# Patient Record
Sex: FEMALE | Race: WHITE | Employment: OTHER | URBAN - NONMETROPOLITAN AREA
[De-identification: names, ages, dates, MRNs, and addresses within clinical notes are randomized per-mention and may not be internally consistent; named-entity substitution may affect disease eponyms.]

---

## 2017-01-17 LAB
A/G RATIO: NORMAL
ALBUMIN SERPL-MCNC: 4.2 G/DL
ALP BLD-CCNC: 63 U/L
ALT SERPL-CCNC: 20 U/L
AST SERPL-CCNC: 21 U/L
AVERAGE GLUCOSE: NORMAL
BASOPHILS ABSOLUTE: ABNORMAL
BASOPHILS RELATIVE PERCENT: ABNORMAL
BILIRUB SERPL-MCNC: 0.3 MG/DL (ref 0.1–1.4)
BILIRUBIN DIRECT: 0.08 MG/DL
BILIRUBIN, INDIRECT: 0.3
BUN BLDV-MCNC: 22 MG/DL
CALCIUM SERPL-MCNC: 10.2 MG/DL
CHLORIDE BLD-SCNC: 103 MMOL/L
CHOLESTEROL, TOTAL: 182 MG/DL
CHOLESTEROL/HDL RATIO: NORMAL
CO2: 23 MMOL/L
CREAT SERPL-MCNC: 1.01 MG/DL
CREATININE, URINE: 37.9
EOSINOPHILS ABSOLUTE: ABNORMAL
EOSINOPHILS RELATIVE PERCENT: ABNORMAL
FERRITIN: 31 NG/ML (ref 9–150)
FOLATE: 20
GFR CALCULATED: 56
GLOBULIN: NORMAL
GLUCOSE BLD-MCNC: 197 MG/DL
HBA1C MFR BLD: 9.1 %
HCT VFR BLD CALC: 36.1 % (ref 36–46)
HDLC SERPL-MCNC: 43 MG/DL (ref 35–70)
HEMOGLOBIN: 11.8 G/DL (ref 12–16)
IRON SATURATION: 19
IRON: 62
LDL CHOLESTEROL CALCULATED: 110 MG/DL (ref 0–160)
LYMPHOCYTES ABSOLUTE: ABNORMAL
LYMPHOCYTES RELATIVE PERCENT: ABNORMAL
MCH RBC QN AUTO: ABNORMAL PG
MCHC RBC AUTO-ENTMCNC: ABNORMAL G/DL
MCV RBC AUTO: ABNORMAL FL
MICROALBUMIN/CREAT 24H UR: 7.7 MG/G{CREAT}
MICROALBUMIN/CREAT UR-RTO: 20.3
MONOCYTES ABSOLUTE: ABNORMAL
MONOCYTES RELATIVE PERCENT: ABNORMAL
NEUTROPHILS ABSOLUTE: ABNORMAL
NEUTROPHILS RELATIVE PERCENT: ABNORMAL
PLATELET # BLD: 237 K/ΜL
PMV BLD AUTO: ABNORMAL FL
POTASSIUM SERPL-SCNC: 5 MMOL/L
PROTEIN TOTAL: 6.3 G/DL
RBC # BLD: 4 10^6/ΜL
SODIUM BLD-SCNC: 142 MMOL/L
TOTAL IRON BINDING CAPACITY: 320
TRIGL SERPL-MCNC: 146 MG/DL
VITAMIN B-12: 234
VITAMIN D 25-HYDROXY: NORMAL
VITAMIN D2, 25 HYDROXY: NORMAL
VITAMIN D3,25 HYDROXY: NORMAL
VLDLC SERPL CALC-MCNC: 29 MG/DL
WBC # BLD: 5.7 10^3/ML

## 2017-01-30 LAB — MICROSCOPIC URINE: NORMAL

## 2017-03-06 ENCOUNTER — NURSE TRIAGE (OUTPATIENT)
Dept: ADMINISTRATIVE | Age: 72
End: 2017-03-06

## 2018-04-08 ENCOUNTER — HOSPITAL ENCOUNTER (EMERGENCY)
Age: 73
Discharge: HOME | End: 2018-04-08
Payer: MEDICARE

## 2018-04-08 VITALS
HEIGHT: 62 IN | HEART RATE: 79 BPM | RESPIRATION RATE: 20 BRPM | BODY MASS INDEX: 32.42 KG/M2 | OXYGEN SATURATION: 95 % | WEIGHT: 176.2 LBS | TEMPERATURE: 99.1 F

## 2018-04-08 DIAGNOSIS — S46.211A BICEPS STRAIN, RIGHT, INITIAL ENCOUNTER: Primary | ICD-10-CM

## 2018-04-08 PROCEDURE — 99213 OFFICE O/P EST LOW 20 MIN: CPT | Performed by: NURSE PRACTITIONER

## 2018-04-08 PROCEDURE — 99212 OFFICE O/P EST SF 10 MIN: CPT

## 2018-04-08 RX ORDER — IBUPROFEN 200 MG
200 TABLET ORAL EVERY 6 HOURS PRN
COMMUNITY
End: 2020-06-22

## 2018-04-08 RX ORDER — AMLODIPINE BESYLATE 10 MG/1
10 TABLET ORAL DAILY
COMMUNITY
End: 2021-01-01 | Stop reason: SDUPTHER

## 2018-04-08 ASSESSMENT — PAIN DESCRIPTION - DESCRIPTORS: DESCRIPTORS: ACHING

## 2018-04-08 ASSESSMENT — PAIN DESCRIPTION - LOCATION: LOCATION: ARM;SHOULDER

## 2018-04-08 ASSESSMENT — ENCOUNTER SYMPTOMS
SHORTNESS OF BREATH: 0
DIARRHEA: 0
VOMITING: 0
COUGH: 0
NAUSEA: 0

## 2018-04-08 ASSESSMENT — PAIN SCALES - GENERAL: PAINLEVEL_OUTOF10: 4

## 2018-04-08 ASSESSMENT — PAIN DESCRIPTION - ORIENTATION: ORIENTATION: RIGHT;UPPER

## 2018-04-08 ASSESSMENT — PAIN DESCRIPTION - PAIN TYPE: TYPE: ACUTE PAIN

## 2018-04-30 ENCOUNTER — HOSPITAL ENCOUNTER (EMERGENCY)
Age: 73
Discharge: HOME | End: 2018-05-01
Payer: MEDICARE

## 2018-04-30 DIAGNOSIS — L72.3 SEBACEOUS CYST: Primary | ICD-10-CM

## 2018-04-30 DIAGNOSIS — L72.3 INFECTED SEBACEOUS CYST: ICD-10-CM

## 2018-04-30 DIAGNOSIS — L08.9 INFECTED SEBACEOUS CYST: ICD-10-CM

## 2018-04-30 PROCEDURE — 99282 EMERGENCY DEPT VISIT SF MDM: CPT

## 2018-05-01 VITALS
HEART RATE: 92 BPM | DIASTOLIC BLOOD PRESSURE: 80 MMHG | SYSTOLIC BLOOD PRESSURE: 180 MMHG | TEMPERATURE: 98.6 F | OXYGEN SATURATION: 95 % | RESPIRATION RATE: 18 BRPM

## 2018-05-01 RX ORDER — CEPHALEXIN 500 MG/1
500 CAPSULE ORAL 4 TIMES DAILY
Qty: 28 CAPSULE | Refills: 0 | Status: SHIPPED | OUTPATIENT
Start: 2018-05-01 | End: 2018-05-08

## 2018-05-07 ASSESSMENT — ENCOUNTER SYMPTOMS
COLOR CHANGE: 0
CHEST TIGHTNESS: 0
COUGH: 0
RHINORRHEA: 0
GASTROINTESTINAL NEGATIVE: 1
BACK PAIN: 0

## 2019-10-16 ENCOUNTER — HOSPITAL ENCOUNTER (OUTPATIENT)
Dept: ULTRASOUND IMAGING | Age: 74
Discharge: HOME | End: 2019-10-16
Payer: MEDICARE

## 2019-10-16 ENCOUNTER — HOSPITAL ENCOUNTER (OUTPATIENT)
Dept: WOMENS IMAGING | Age: 74
Discharge: HOME | End: 2019-10-16
Payer: MEDICARE

## 2019-10-16 DIAGNOSIS — I10 ESSENTIAL HYPERTENSION: ICD-10-CM

## 2019-10-16 DIAGNOSIS — Z12.39 BREAST SCREENING: ICD-10-CM

## 2019-10-16 DIAGNOSIS — Z78.0 POST-MENOPAUSAL: ICD-10-CM

## 2019-10-16 DIAGNOSIS — E78.2 MIXED HYPERLIPIDEMIA: ICD-10-CM

## 2019-10-16 PROCEDURE — 77080 DXA BONE DENSITY AXIAL: CPT

## 2019-10-16 PROCEDURE — 76706 US ABDL AORTA SCREEN AAA: CPT

## 2019-10-16 PROCEDURE — 77063 BREAST TOMOSYNTHESIS BI: CPT

## 2019-11-26 ENCOUNTER — HOSPITAL ENCOUNTER (OUTPATIENT)
Dept: GENERAL RADIOLOGY | Age: 74
Discharge: HOME | End: 2019-11-26
Payer: MEDICARE

## 2019-11-26 ENCOUNTER — HOSPITAL ENCOUNTER (OUTPATIENT)
Age: 74
Discharge: HOME | End: 2019-11-26
Payer: MEDICARE

## 2019-11-26 DIAGNOSIS — M25.562 LEFT KNEE PAIN, UNSPECIFIED CHRONICITY: ICD-10-CM

## 2019-11-26 PROCEDURE — 73562 X-RAY EXAM OF KNEE 3: CPT

## 2019-12-03 ENCOUNTER — APPOINTMENT (OUTPATIENT)
Dept: GENERAL RADIOLOGY | Age: 74
End: 2019-12-03
Payer: MEDICARE

## 2019-12-03 ENCOUNTER — HOSPITAL ENCOUNTER (EMERGENCY)
Age: 74
Discharge: HOME | End: 2019-12-03
Payer: MEDICARE

## 2019-12-03 VITALS
OXYGEN SATURATION: 97 % | RESPIRATION RATE: 16 BRPM | HEART RATE: 69 BPM | WEIGHT: 170 LBS | HEIGHT: 60 IN | TEMPERATURE: 98.8 F | BODY MASS INDEX: 33.38 KG/M2 | DIASTOLIC BLOOD PRESSURE: 65 MMHG | SYSTOLIC BLOOD PRESSURE: 162 MMHG

## 2019-12-03 DIAGNOSIS — S70.02XA CONTUSION OF LEFT HIP, INITIAL ENCOUNTER: Primary | ICD-10-CM

## 2019-12-03 DIAGNOSIS — S76.012A STRAIN OF LEFT HIP, INITIAL ENCOUNTER: ICD-10-CM

## 2019-12-03 PROCEDURE — 73502 X-RAY EXAM HIP UNI 2-3 VIEWS: CPT

## 2019-12-03 PROCEDURE — 6370000000 HC RX 637 (ALT 250 FOR IP): Performed by: EMERGENCY MEDICINE

## 2019-12-03 PROCEDURE — 99284 EMERGENCY DEPT VISIT MOD MDM: CPT

## 2019-12-03 RX ORDER — HYDROCODONE BITARTRATE AND ACETAMINOPHEN 5; 325 MG/1; MG/1
1 TABLET ORAL ONCE
Status: COMPLETED | OUTPATIENT
Start: 2019-12-03 | End: 2019-12-03

## 2019-12-03 RX ORDER — NAPROXEN 500 MG/1
500 TABLET ORAL 2 TIMES DAILY
Qty: 20 TABLET | Refills: 0 | Status: SHIPPED | OUTPATIENT
Start: 2019-12-03 | End: 2020-06-22

## 2019-12-03 RX ORDER — METAXALONE 400 MG/1
400 TABLET ORAL 3 TIMES DAILY
Qty: 30 TABLET | Refills: 0 | Status: SHIPPED | OUTPATIENT
Start: 2019-12-03 | End: 2019-12-13

## 2019-12-03 RX ADMIN — HYDROCODONE BITARTRATE AND ACETAMINOPHEN 1 TABLET: 5; 325 TABLET ORAL at 20:45

## 2019-12-03 ASSESSMENT — PAIN DESCRIPTION - PAIN TYPE: TYPE: ACUTE PAIN

## 2019-12-03 ASSESSMENT — PAIN SCALES - GENERAL
PAINLEVEL_OUTOF10: 5
PAINLEVEL_OUTOF10: 3
PAINLEVEL_OUTOF10: 5

## 2019-12-03 ASSESSMENT — PAIN DESCRIPTION - PROGRESSION: CLINICAL_PROGRESSION: GRADUALLY WORSENING

## 2019-12-03 ASSESSMENT — PAIN DESCRIPTION - FREQUENCY: FREQUENCY: CONTINUOUS

## 2019-12-03 ASSESSMENT — PAIN DESCRIPTION - ORIENTATION: ORIENTATION: LEFT

## 2019-12-03 ASSESSMENT — PAIN DESCRIPTION - ONSET: ONSET: ON-GOING

## 2019-12-03 ASSESSMENT — PAIN DESCRIPTION - LOCATION: LOCATION: KNEE

## 2019-12-03 ASSESSMENT — PAIN DESCRIPTION - DESCRIPTORS: DESCRIPTORS: DISCOMFORT

## 2019-12-04 ASSESSMENT — ENCOUNTER SYMPTOMS
SHORTNESS OF BREATH: 0
ABDOMINAL DISTENTION: 0
BACK PAIN: 0
COUGH: 0
COLOR CHANGE: 0

## 2020-06-17 LAB
AVERAGE GLUCOSE: 174
CREATININE, URINE: 90.8
HBA1C MFR BLD: 7.8 %
MICROALBUMIN/CREAT 24H UR: 7287 MG/G{CREAT}
MICROALBUMIN/CREAT UR-RTO: 8023
MICROSCOPIC URINE: NORMAL

## 2020-06-22 RX ORDER — PREDNISOLONE ACETATE 10 MG/ML
2 SUSPENSION/ DROPS OPHTHALMIC DAILY
COMMUNITY
End: 2020-10-01 | Stop reason: ALTCHOICE

## 2020-06-22 RX ORDER — TIMOLOL MALEATE 5 MG/ML
1 SOLUTION/ DROPS OPHTHALMIC 2 TIMES DAILY
COMMUNITY

## 2020-06-22 RX ORDER — FAMOTIDINE 20 MG
1 TABLET ORAL 2 TIMES DAILY
Status: ON HOLD | COMMUNITY
End: 2020-10-08 | Stop reason: ALTCHOICE

## 2020-06-22 RX ORDER — ASCORBIC ACID 500 MG
500 TABLET ORAL PRN
Status: ON HOLD | COMMUNITY
End: 2020-10-12 | Stop reason: HOSPADM

## 2020-06-25 ENCOUNTER — OFFICE VISIT (OUTPATIENT)
Dept: NEPHROLOGY | Age: 75
End: 2020-06-25
Payer: MEDICARE

## 2020-06-25 VITALS
BODY MASS INDEX: 32.89 KG/M2 | OXYGEN SATURATION: 99 % | HEART RATE: 88 BPM | SYSTOLIC BLOOD PRESSURE: 175 MMHG | DIASTOLIC BLOOD PRESSURE: 78 MMHG | TEMPERATURE: 97.8 F | WEIGHT: 168.4 LBS

## 2020-06-25 PROCEDURE — G8427 DOCREV CUR MEDS BY ELIG CLIN: HCPCS | Performed by: INTERNAL MEDICINE

## 2020-06-25 PROCEDURE — 1090F PRES/ABSN URINE INCON ASSESS: CPT | Performed by: INTERNAL MEDICINE

## 2020-06-25 PROCEDURE — G8417 CALC BMI ABV UP PARAM F/U: HCPCS | Performed by: INTERNAL MEDICINE

## 2020-06-25 PROCEDURE — 99204 OFFICE O/P NEW MOD 45 MIN: CPT | Performed by: INTERNAL MEDICINE

## 2020-06-25 PROCEDURE — G8399 PT W/DXA RESULTS DOCUMENT: HCPCS | Performed by: INTERNAL MEDICINE

## 2020-06-25 PROCEDURE — 4040F PNEUMOC VAC/ADMIN/RCVD: CPT | Performed by: INTERNAL MEDICINE

## 2020-06-25 PROCEDURE — 1123F ACP DISCUSS/DSCN MKR DOCD: CPT | Performed by: INTERNAL MEDICINE

## 2020-06-25 PROCEDURE — 3051F HG A1C>EQUAL 7.0%<8.0%: CPT | Performed by: INTERNAL MEDICINE

## 2020-06-25 PROCEDURE — 1036F TOBACCO NON-USER: CPT | Performed by: INTERNAL MEDICINE

## 2020-06-25 NOTE — PROGRESS NOTES
Provider, MD        Physical Examination:  VITALS:  BP (!) 175/78 (Site: Right Upper Arm, Position: Sitting, Cuff Size: Small Adult)   Pulse 88   Temp 97.8 °F (36.6 °C)   Wt 168 lb 6.4 oz (76.4 kg)   SpO2 99%   BMI 32.89 kg/m²    Body mass index is 32.89 kg/m². Wt Readings from Last 3 Encounters:   06/25/20 168 lb 6.4 oz (76.4 kg)   12/03/19 170 lb (77.1 kg)   04/08/18 176 lb 3.2 oz (79.9 kg)     Constitutional and General Appearance: alert and cooperative with exam, appears comfortable, no distress, not diaphoretic  Eyes: no icteric sclera in left eye or right eye,  no pallor conjunctiva in left or right eye, no discharge seen from left eye or right eye  Ears and Nose: normal external appearance of left and right ear. Both ear lobules are nontender to palpation. Normal external appearance of nose. No active drainage from nose. Oral: moist oral mucus membranes  Neck: No jugular venous distention, appears symmetric, good ROM  Lungs: Air entry B/L, no crackles or rales, no use of accessory muscles or labored breathing  Chest: No chest wall tenderness  Heart: regular rate, S1, S2  Extremities: Trace LE edema, no tenderness  GI: soft, non-tender, no guarding, no distention  Skin: no rash seen on exposed extremities, warm to touch  Musculo: moves all extremities, no clubbing or cyanosis of digits of either upper extremity. Neuro: no slurred speech, no facial drooping, symmetric strength  Psychiatric: Normal mood and affect, Not agitated     Laboratory & Diagnostics:  Old progress notes from referring physician reviewed. Old labs reviewed: Oct 2014: k 4.3, Creat 0.9  June 2020: UPCR 8 grams/g, Hb AIC 7.8%     Impression/Plan:   1. Nephrotic range proteinuria: Appears mostly due to diabetic nephropathy but cannot rule out other causes. Will send work-up including serologies. See below. Avoid high salt intake. Check BMP, 24 hr urine collection for protein.  Obtain kidney US to evaluate echogenicity and kidney

## 2020-07-09 ENCOUNTER — HOSPITAL ENCOUNTER (OUTPATIENT)
Age: 75
Discharge: HOME | End: 2020-07-09
Payer: MEDICARE

## 2020-07-09 ENCOUNTER — HOSPITAL ENCOUNTER (OUTPATIENT)
Dept: ULTRASOUND IMAGING | Age: 75
Discharge: HOME | End: 2020-07-09
Payer: MEDICARE

## 2020-07-09 LAB
ALBUMIN SERPL-MCNC: 4.2 G/DL (ref 3.5–5.1)
ALP BLD-CCNC: 68 U/L (ref 38–126)
ALT SERPL-CCNC: 15 U/L (ref 11–66)
ANION GAP SERPL CALCULATED.3IONS-SCNC: 14 MEQ/L (ref 8–16)
AST SERPL-CCNC: 23 U/L (ref 5–40)
BACTERIA: ABNORMAL
BILIRUB SERPL-MCNC: 0.2 MG/DL (ref 0.3–1.2)
BILIRUBIN URINE: NEGATIVE
BLOOD, URINE: NEGATIVE
BUN BLDV-MCNC: 31 MG/DL (ref 7–22)
CALCIUM SERPL-MCNC: 10.2 MG/DL (ref 8.5–10.5)
CASTS: ABNORMAL /LPF
CASTS: ABNORMAL /LPF
CHARACTER, URINE: CLEAR
CHLORIDE BLD-SCNC: 97 MEQ/L (ref 98–111)
CO2: 26 MEQ/L (ref 23–33)
COLOR: YELLOW
CREAT SERPL-MCNC: 1.9 MG/DL (ref 0.4–1.2)
CRYSTALS: ABNORMAL
EPITHELIAL CELLS, UA: ABNORMAL /HPF
ERYTHROCYTE [DISTWIDTH] IN BLOOD BY AUTOMATED COUNT: 12.7 % (ref 11.5–14.5)
ERYTHROCYTE [DISTWIDTH] IN BLOOD BY AUTOMATED COUNT: 41.6 FL (ref 35–45)
GFR SERPL CREATININE-BSD FRML MDRD: 26 ML/MIN/1.73M2
GLUCOSE BLD-MCNC: 204 MG/DL (ref 70–108)
GLUCOSE, URINE: 100 MG/DL
HCT VFR BLD CALC: 39.2 % (ref 37–47)
HEMOGLOBIN: 12.8 GM/DL (ref 12–16)
HEPATITIS C ANTIBODY: NEGATIVE
KETONES, URINE: NEGATIVE
LEUKOCYTE EST, POC: ABNORMAL
MCH RBC QN AUTO: 29.5 PG (ref 26–33)
MCHC RBC AUTO-ENTMCNC: 32.7 GM/DL (ref 32.2–35.5)
MCV RBC AUTO: 90.3 FL (ref 81–99)
MISCELLANEOUS LAB TEST RESULT: ABNORMAL
NITRITE, URINE: NEGATIVE
PH UA: 7 (ref 5–9)
PLATELET # BLD: 254 THOU/MM3 (ref 130–400)
PMV BLD AUTO: 9.5 FL (ref 9.4–12.4)
POTASSIUM SERPL-SCNC: 4.6 MEQ/L (ref 3.5–5.2)
PROTEIN UA: 300 MG/DL
RBC # BLD: 4.34 MILL/MM3 (ref 4.2–5.4)
RBC URINE: ABNORMAL /HPF
RENAL EPITHELIAL, UA: ABNORMAL
SODIUM BLD-SCNC: 137 MEQ/L (ref 135–145)
SPECIFIC GRAVITY UA: 1.01 (ref 1–1.03)
TOTAL PROTEIN: 7.4 G/DL (ref 6.1–8)
UROBILINOGEN, URINE: 0.2 EU/DL (ref 0–1)
VITAMIN D 25-HYDROXY: 17 NG/ML (ref 30–100)
WBC # BLD: 8.2 THOU/MM3 (ref 4.8–10.8)
WBC UA: ABNORMAL /HPF
YEAST: ABNORMAL

## 2020-07-09 PROCEDURE — 85027 COMPLETE CBC AUTOMATED: CPT

## 2020-07-09 PROCEDURE — 86255 FLUORESCENT ANTIBODY SCREEN: CPT

## 2020-07-09 PROCEDURE — 81001 URINALYSIS AUTO W/SCOPE: CPT

## 2020-07-09 PROCEDURE — 83516 IMMUNOASSAY NONANTIBODY: CPT

## 2020-07-09 PROCEDURE — 36415 COLL VENOUS BLD VENIPUNCTURE: CPT

## 2020-07-09 PROCEDURE — 84165 PROTEIN E-PHORESIS SERUM: CPT

## 2020-07-09 PROCEDURE — 80053 COMPREHEN METABOLIC PANEL: CPT

## 2020-07-09 PROCEDURE — 86334 IMMUNOFIX E-PHORESIS SERUM: CPT

## 2020-07-09 PROCEDURE — 86160 COMPLEMENT ANTIGEN: CPT

## 2020-07-09 PROCEDURE — 86038 ANTINUCLEAR ANTIBODIES: CPT

## 2020-07-09 PROCEDURE — 82784 ASSAY IGA/IGD/IGG/IGM EACH: CPT

## 2020-07-09 PROCEDURE — 82306 VITAMIN D 25 HYDROXY: CPT

## 2020-07-09 PROCEDURE — 84155 ASSAY OF PROTEIN SERUM: CPT

## 2020-07-09 PROCEDURE — 86803 HEPATITIS C AB TEST: CPT

## 2020-07-09 PROCEDURE — 76770 US EXAM ABDO BACK WALL COMP: CPT

## 2020-07-09 PROCEDURE — 83883 ASSAY NEPHELOMETRY NOT SPEC: CPT

## 2020-07-12 LAB
ANA SCREEN: DETECTED
KAPPA/LAMBDA FREE LIGHT CHAINS: NORMAL
NEUTROPHIL CYTOPLASMIC AB IGG: NORMAL

## 2020-07-13 LAB
ANA PATTERN: ABNORMAL
ANA TITER: ABNORMAL
ANTINUCLEAR AB INTERPRETIVE COMMENT: ABNORMAL
ANTINUCLEAR ANTIBODY, HEP-2, IGG: DETECTED
C3 COMPLEMENT: 169 MG/DL (ref 88–201)
C4 COMPLEMENT: 35 MG/DL (ref 10–40)
GLOMERULAR BASEMENT MEMBRANE ANTIBODY: NORMAL
IMMUNOFIXATION ELECTROPHORESIS: NORMAL

## 2020-07-16 ENCOUNTER — HOSPITAL ENCOUNTER (OUTPATIENT)
Age: 75
Discharge: HOME | End: 2020-07-16
Payer: MEDICARE

## 2020-07-16 PROCEDURE — 84156 ASSAY OF PROTEIN URINE: CPT

## 2020-07-17 LAB
HOURS COLLECTED: 24 HRS
PROTEIN 24 HOUR URINE: 5547 MG/24 HR (ref 42–225)
PROTEIN, URINE: 198.1 MG/DL
URINE VOLUME: 2800 ML

## 2020-07-20 ENCOUNTER — TELEPHONE (OUTPATIENT)
Dept: NEPHROLOGY | Age: 75
End: 2020-07-20

## 2020-07-20 ENCOUNTER — OFFICE VISIT (OUTPATIENT)
Dept: NEPHROLOGY | Age: 75
End: 2020-07-20
Payer: MEDICARE

## 2020-07-20 VITALS
HEART RATE: 81 BPM | DIASTOLIC BLOOD PRESSURE: 72 MMHG | OXYGEN SATURATION: 97 % | TEMPERATURE: 97.2 F | BODY MASS INDEX: 32.3 KG/M2 | WEIGHT: 165.4 LBS | SYSTOLIC BLOOD PRESSURE: 156 MMHG

## 2020-07-20 PROCEDURE — G8417 CALC BMI ABV UP PARAM F/U: HCPCS | Performed by: INTERNAL MEDICINE

## 2020-07-20 PROCEDURE — 1123F ACP DISCUSS/DSCN MKR DOCD: CPT | Performed by: INTERNAL MEDICINE

## 2020-07-20 PROCEDURE — 4040F PNEUMOC VAC/ADMIN/RCVD: CPT | Performed by: INTERNAL MEDICINE

## 2020-07-20 PROCEDURE — G8399 PT W/DXA RESULTS DOCUMENT: HCPCS | Performed by: INTERNAL MEDICINE

## 2020-07-20 PROCEDURE — 99214 OFFICE O/P EST MOD 30 MIN: CPT | Performed by: INTERNAL MEDICINE

## 2020-07-20 PROCEDURE — 1036F TOBACCO NON-USER: CPT | Performed by: INTERNAL MEDICINE

## 2020-07-20 PROCEDURE — G8427 DOCREV CUR MEDS BY ELIG CLIN: HCPCS | Performed by: INTERNAL MEDICINE

## 2020-07-20 PROCEDURE — 1090F PRES/ABSN URINE INCON ASSESS: CPT | Performed by: INTERNAL MEDICINE

## 2020-07-20 NOTE — PROGRESS NOTES
[facility]  [address]  [facility] [address]  Dept: [phone]  Loc: [phone]  Office Progress Note  7/20/2020 1:28 PM      Pt Name:    Avis Mcardle  YOB: 1945  Primary Care Physician:  Bandar Davies     Chief Complaint:   Chief Complaint   Patient presents with    Follow-up     Proteinuria: Patient is here to follow-up on test results that were done for evaluation of proteinuria        Background Information/Interval History:   The patient is a 67 y.o. pleasant white female with hx DM since 1996, HTN who is here for follow-up evaluation of proteinuria. I saw her about 4 weeks ago for initial office visit. She reports hx some leg swelling. No hx malignancy. No neuropathy symptoms. She had corneal transplant Right eye 2017. She sees bubbles in urine. She takes ibuprofen as needed for left knee pain but not regularly. Sugars are generally stable but it was in 200 range previously but were up to 300s when she was initially diagnosed. She is on metformin, Tremaine Roblero, amaryl for DM. She is on norvasc and lisinopril for HTN. She has no new complaints. No dysuria. No hematuria. No leg swelling. No chest pain or shortness of breath.   Patient denies any weight loss     Past History:  Past Medical History:   Diagnosis Date    Cataracts, bilateral     Diabetes mellitus ([address]     Glaucoma     Hyperlipidemia     Hypertension     Nausea & vomiting      Past Surgical History:   Procedure Laterality Date    CHOLECYSTECTOMY  80    CORNEAL TRANSPLANT      HERNIA REPAIR  90    HYSTERECTOMY  89    INCISIONAL HERNIA REPAIR  10/28/2014    laparoscopic incisional herina repair w mesh--Dr. Alexei hZeng    LEG SURGERY Right 1962    FX W/PLATES & REMOVED    TONSILLECTOMY  as a child        VITALS:  BP (!) 156/72 (Site: Right Upper Arm, Position: Sitting, Cuff Size: Small Adult)   Pulse 81   Temp 97.2 °F (36.2 °C)   Wt 165 lb 6.4 oz (75 kg)   SpO2 97%   BMI 32.30 kg/m²   Wt Readings from Last 3 Encounters:   07/20/20 165 lb 6.4 oz (75 kg)   06/25/20 168 lb 6.4 oz (76.4 kg)   12/03/19 170 lb (77.1 kg)     Body mass index is 32.3 kg/m². General Appearance: alert and cooperative with exam, appears comfortable, no distress  Oral: moist oral mucus membranes  Neck: No jugular venous distention  Lungs: Air entry B/L, no crackles or rales, no use of accessory muscles  Heart: S1, S2 heard  GI: soft, non-tender, no guarding  Extremities: No sig LE edema     Medications:  Current Outpatient Medications   Medication Sig Dispense Refill    BETA CAROTENE PO Take by mouth daily      calcium citrate-vitamin D (CITRICAL + D) 315-250 MG-UNIT TABS per tablet Take 1 tablet by mouth daily (with breakfast)      Vitamin D, Cholecalciferol, 25 MCG (1000 UT) CAPS Take 1 capsule by mouth daily      vitamin C (ASCORBIC ACID) 500 MG tablet Take 500 mg by mouth as needed      timolol (TIMOPTIC) 0.5 % ophthalmic solution 1 drop 2 times daily      prednisoLONE acetate (PRED FORTE) 1 % ophthalmic suspension 2 drops daily      metFORMIN (GLUCOPHAGE) 1000 MG tablet Take 1,000 mg by mouth 2 times daily (with meals)      SITagliptin (JANUVIA) 100 MG tablet Take 100 mg by mouth daily       amLODIPine (NORVASC) 10 MG tablet Take 10 mg by mouth daily      aspirin 325 MG tablet Take 325 mg by mouth daily.  Multiple Vitamins-Minerals (CENTRUM SILVER) TABS Take  by mouth daily.  lisinopril (PRINIVIL;ZESTRIL) 20 MG tablet Take 20 mg by mouth daily.  glimepiride (AMARYL) 4 MG tablet Take 4 mg by mouth every morning (before breakfast) Indications: 2mg at breakfast and 4mg at dinner       pravastatin (PRAVACHOL) 40 MG tablet Take 40 mg by mouth nightly. No current facility-administered medications for this visit. Laboratory & Diagnostics:  Old progress notes from referring physician reviewed. Old labs reviewed:   Oct 2014: k 4.3, urine    Anti-DNA Antibody, Double-Stranded    Anti-Neutrophilic Cytoplasmic Antibody   Fernando Villa (JORGE) Antibody IgG    Rheumatoid Factor   [facility], [facility], Rheumatology, [facility] [address]     Return in about 3 weeks (around 8/10/2020).     Maxx Kwok MD  [facility]

## 2020-07-27 ENCOUNTER — TELEPHONE (OUTPATIENT)
Dept: NEPHROLOGY | Age: 75
End: 2020-07-27

## 2020-07-27 NOTE — TELEPHONE ENCOUNTER
----- Message from Yoana Eastman MD sent at 7/26/2020 10:45 AM EDT -----  Please obtain old labs from PCP office from last 3-4 years

## 2020-07-27 NOTE — TELEPHONE ENCOUNTER
Called Dr. Stacie Najera office they don't have a lot of labs from 3-4 yrs but has some in 2017 and 2020    She is faxing those now.

## 2020-08-17 ENCOUNTER — HOSPITAL ENCOUNTER (OUTPATIENT)
Dept: CT IMAGING | Age: 75
Discharge: HOME | End: 2020-08-17
Payer: MEDICARE

## 2020-08-17 ENCOUNTER — HOSPITAL ENCOUNTER (OUTPATIENT)
Age: 75
Discharge: HOME | End: 2020-08-17
Payer: MEDICARE

## 2020-08-17 LAB
ANION GAP SERPL CALCULATED.3IONS-SCNC: 8 MEQ/L (ref 8–16)
BACTERIA: ABNORMAL
BILIRUBIN URINE: NEGATIVE
BLOOD, URINE: NEGATIVE
BUN BLDV-MCNC: 26 MG/DL (ref 7–22)
CALCIUM SERPL-MCNC: 9.9 MG/DL (ref 8.5–10.5)
CASTS: ABNORMAL /LPF
CASTS: ABNORMAL /LPF
CHARACTER, URINE: CLEAR
CHLORIDE BLD-SCNC: 102 MEQ/L (ref 98–111)
CO2: 26 MEQ/L (ref 23–33)
COLOR: YELLOW
CREAT SERPL-MCNC: 1.5 MG/DL (ref 0.4–1.2)
CREATININE URINE: 70.8 MG/DL
CRYSTALS: ABNORMAL
EPITHELIAL CELLS, UA: ABNORMAL /HPF
GFR SERPL CREATININE-BSD FRML MDRD: 34 ML/MIN/1.73M2
GLUCOSE BLD-MCNC: 262 MG/DL (ref 70–108)
GLUCOSE, URINE: 100 MG/DL
KETONES, URINE: NEGATIVE
LEUKOCYTE EST, POC: ABNORMAL
MISCELLANEOUS LAB TEST RESULT: ABNORMAL
NITRITE, URINE: NEGATIVE
PH UA: 6 (ref 5–9)
POTASSIUM SERPL-SCNC: 4.2 MEQ/L (ref 3.5–5.2)
PROT/CREAT RATIO, UR: 8.69
PROTEIN UA: 300 MG/DL
PROTEIN, URINE: 615 MG/DL
RBC URINE: ABNORMAL /HPF
RENAL EPITHELIAL, UA: ABNORMAL
RHEUMATOID FACTOR: 11 IU/ML (ref 0–13)
SODIUM BLD-SCNC: 136 MEQ/L (ref 135–145)
SPECIFIC GRAVITY UA: 1.02 (ref 1–1.03)
UROBILINOGEN, URINE: 0.2 EU/DL (ref 0–1)
WBC UA: ABNORMAL /HPF
YEAST: ABNORMAL

## 2020-08-17 PROCEDURE — 86235 NUCLEAR ANTIGEN ANTIBODY: CPT

## 2020-08-17 PROCEDURE — 36415 COLL VENOUS BLD VENIPUNCTURE: CPT

## 2020-08-17 PROCEDURE — 84156 ASSAY OF PROTEIN URINE: CPT

## 2020-08-17 PROCEDURE — 86430 RHEUMATOID FACTOR TEST QUAL: CPT

## 2020-08-17 PROCEDURE — 86255 FLUORESCENT ANTIBODY SCREEN: CPT

## 2020-08-17 PROCEDURE — 86225 DNA ANTIBODY NATIVE: CPT

## 2020-08-17 PROCEDURE — 74176 CT ABD & PELVIS W/O CONTRAST: CPT

## 2020-08-17 PROCEDURE — 80048 BASIC METABOLIC PNL TOTAL CA: CPT

## 2020-08-17 PROCEDURE — 82570 ASSAY OF URINE CREATININE: CPT

## 2020-08-17 PROCEDURE — 81001 URINALYSIS AUTO W/SCOPE: CPT

## 2020-08-19 LAB — DSDNA ANTIBODY: NORMAL

## 2020-08-20 ENCOUNTER — OFFICE VISIT (OUTPATIENT)
Dept: NEPHROLOGY | Age: 75
End: 2020-08-20
Payer: MEDICARE

## 2020-08-20 VITALS
DIASTOLIC BLOOD PRESSURE: 77 MMHG | OXYGEN SATURATION: 98 % | WEIGHT: 160 LBS | HEART RATE: 76 BPM | BODY MASS INDEX: 31.25 KG/M2 | SYSTOLIC BLOOD PRESSURE: 169 MMHG | TEMPERATURE: 97.3 F

## 2020-08-20 LAB
ANTI-SMITH: 5 AU/ML (ref 0–40)
NEUTROPHIL CYTOPLASMIC AB IGG: NORMAL

## 2020-08-20 PROCEDURE — 99214 OFFICE O/P EST MOD 30 MIN: CPT | Performed by: INTERNAL MEDICINE

## 2020-08-20 PROCEDURE — G8399 PT W/DXA RESULTS DOCUMENT: HCPCS | Performed by: INTERNAL MEDICINE

## 2020-08-20 PROCEDURE — 1036F TOBACCO NON-USER: CPT | Performed by: INTERNAL MEDICINE

## 2020-08-20 PROCEDURE — G8427 DOCREV CUR MEDS BY ELIG CLIN: HCPCS | Performed by: INTERNAL MEDICINE

## 2020-08-20 PROCEDURE — G8417 CALC BMI ABV UP PARAM F/U: HCPCS | Performed by: INTERNAL MEDICINE

## 2020-08-20 PROCEDURE — 1123F ACP DISCUSS/DSCN MKR DOCD: CPT | Performed by: INTERNAL MEDICINE

## 2020-08-20 PROCEDURE — 1090F PRES/ABSN URINE INCON ASSESS: CPT | Performed by: INTERNAL MEDICINE

## 2020-08-20 PROCEDURE — 4040F PNEUMOC VAC/ADMIN/RCVD: CPT | Performed by: INTERNAL MEDICINE

## 2020-08-20 RX ORDER — ERGOCALCIFEROL 1.25 MG/1
50000 CAPSULE ORAL WEEKLY
Qty: 12 CAPSULE | Refills: 0 | Status: ON HOLD | OUTPATIENT
Start: 2020-08-20 | End: 2020-10-12 | Stop reason: HOSPADM

## 2020-08-20 NOTE — PROGRESS NOTES
[facility]  [address]  [facility] [address]  Dept: [phone]  Loc: [phone]  Office Progress Note  8/20/2020 11:15 AM      Pt Name:    Rdo Gifford  YOB: 1945  Primary Care Physician:  Fransico Rolle     Chief Complaint:   Chief Complaint   Patient presents with    Follow-up     Proteinuria and renal mass follow-up        Background Information/Interval History:   The patient is a 67 y.o. pleasant white female with hx DM since 1996, HTN who is here for follow-up evaluation of proteinuria and renal mass. She reports hx some leg swelling. She had corneal transplant Right eye 2017. She sees bubbles in urine. She takes ibuprofen as needed for left knee pain but not regularly. Sugars are generally stable but it was in 200 range previously but were up to 300s when she was initially diagnosed. She is on norvasc and lisinopril for HTN. Patient is here for follow-up to discuss further management of proteinuria and renal mass. I had ordered a CAT scan and she is here to discuss further results. Patient feels somewhat nervous today. Blood pressure today is 169/77 in office. She has not been checking her BP regularly at home. She reports sugars were in 140 to 200 range but now in 170 range. She is seeing PCP next week. She is on norvasc and lisinopril. No gross hematuria.      Past History:  Past Medical History:   Diagnosis Date    Cataracts, bilateral     Diabetes mellitus ([address]     Glaucoma     Hyperlipidemia     Hypertension     Nausea & vomiting      Past Surgical History:   Procedure Laterality Date    CHOLECYSTECTOMY  80    CORNEAL TRANSPLANT      HERNIA REPAIR  90    HYSTERECTOMY  89    INCISIONAL HERNIA REPAIR  10/28/2014    laparoscopic incisional herina repair w mesh--Dr. Montemayor Dear    LEG SURGERY Right 1962    FX W/PLATES & REMOVED    TONSILLECTOMY  as a child        VITALS:  BP (!) 169/77 (Site: Left Upper Arm, Position: Sitting, Cuff Size: Small Adult)   Pulse 76   Temp 97.3 °F (36.3 °C)   Wt 160 lb (72.6 kg)   SpO2 98%   BMI 31.25 kg/m²   Wt Readings from Last 3 Encounters:   08/20/20 160 lb (72.6 kg)   07/20/20 165 lb 6.4 oz (75 kg)   06/25/20 168 lb 6.4 oz (76.4 kg)     Body mass index is 31.25 kg/m². General Appearance: alert and cooperative with exam, appears comfortable, no distress  Oral: moist oral mucus membranes  Neck: No jugular venous distention  Lungs: Air entry B/L, no crackles or rales, no use of accessory muscles  Heart: S1, S2 heard  GI: soft, non-tender, no guarding  Extremities: No sig LE edema     Medications:  Current Outpatient Medications   Medication Sig Dispense Refill    calcium citrate-vitamin D (CITRICAL + D) 315-250 MG-UNIT TABS per tablet Take 1 tablet by mouth daily (with breakfast)      Vitamin D, Cholecalciferol, 25 MCG (1000 UT) CAPS Take 1 capsule by mouth 2 times daily       vitamin C (ASCORBIC ACID) 500 MG tablet Take 500 mg by mouth as needed      timolol (TIMOPTIC) 0.5 % ophthalmic solution 1 drop 2 times daily      prednisoLONE acetate (PRED FORTE) 1 % ophthalmic suspension 2 drops daily      SITagliptin (JANUVIA) 100 MG tablet Take 100 mg by mouth daily       amLODIPine (NORVASC) 10 MG tablet Take 10 mg by mouth daily      aspirin 325 MG tablet Take 325 mg by mouth daily.  Multiple Vitamins-Minerals (CENTRUM SILVER) TABS Take  by mouth daily.  lisinopril (PRINIVIL;ZESTRIL) 20 MG tablet Take 20 mg by mouth daily.  glimepiride (AMARYL) 4 MG tablet Take 4 mg by mouth every morning (before breakfast) Indications: 2mg at breakfast and 4mg at dinner       pravastatin (PRAVACHOL) 40 MG tablet Take 40 mg by mouth nightly. No current facility-administered medications for this visit. Laboratory & Diagnostics:  Old progress notes from referring physician reviewed. Old labs reviewed:   Oct 2014: k 4.3, Creat 0.9  Karrie 2020: UPCR 8 grams/g, Hb AIC 7.8%  24 hr protein 5.5 grams  GOYO: speckled pattern  UA: +300 protein, no blood  JULIET/Complements/Anti GBM: all normal    HbAIC 7.8%  US: B/L renal cysts, Left renal mass 4.4 cm  July 2020: K 4.6, Creat 1.9, Ca 10.2, Albumin 4.2  Aug 2020: creat 1.5, K 4.2, glucose 262, UCR 8.69 g/g, Positive GOYO     Impression/Plan:   1. Nephrotic range proteinuria: Appears mostly due to diabetic nephropathy but cannot rule out other causes. Serologies returned and came back positive for GOYO with normal complements. All serologies were normal except elevated GOYO. Discussed with patient. This could be secondary to diabetic nephropathy but cannot rule out other causes. Primary membranous versus secondary membranous are also in the differentials. Typically we would have proceeded with a CT-guided kidney biopsy but more importantly we also incidentally found 4.6 cm left renal mass which needs evaluation first.  Discussed with patient at length. Will refer her to urology. See below  2. Left renal mass: CT scan shows 4.6 cm left renal mass. This is highly suspicious for malignancy. I will refer her to urology for further evaluation. Will await urology recommendations. 3. Positive GOYO: refer to rheumatology, already done  4. DM with proteinuria: Continue with lisinopril  5. CKD III : Creatinine down to 1.5. Was 1.9 in July. Appears to be new baseline. Creatinine in 2017 was 1.01 but no labs available within last 3 years. 6. HTN: continue with lisinopril and norvasc, advised low-salt diet. Needs better blood pressure control   7. Hx Cataracts  8. Mild leg swelling  9. Vit D def: will start ergocalciferol    Orders Placed This Encounter   Procedures    Basic Metabolic Panel    CBC    Protein / creatinine ratio, urine    Winnie Abraham MD, Urology, [facility] [address]     Return in about 6 weeks (around 10/1/2020).     Doug Colon MD  [facility]

## 2020-08-26 ENCOUNTER — OFFICE VISIT (OUTPATIENT)
Dept: FAMILY MEDICINE CLINIC | Age: 75
End: 2020-08-26
Payer: MEDICARE

## 2020-08-26 VITALS
HEART RATE: 62 BPM | DIASTOLIC BLOOD PRESSURE: 68 MMHG | HEIGHT: 61 IN | RESPIRATION RATE: 16 BRPM | WEIGHT: 160 LBS | SYSTOLIC BLOOD PRESSURE: 130 MMHG | BODY MASS INDEX: 30.21 KG/M2 | TEMPERATURE: 98.5 F

## 2020-08-26 PROBLEM — I10 BENIGN ESSENTIAL HTN: Status: ACTIVE | Noted: 2020-08-26

## 2020-08-26 PROBLEM — Z00.00 ROUTINE GENERAL MEDICAL EXAMINATION AT A HEALTH CARE FACILITY: Status: ACTIVE | Noted: 2020-08-26

## 2020-08-26 LAB
HBA1C MFR BLD: 8.3 %
HBA1C MFR BLD: 8.3 % (ref 4.3–5.7)

## 2020-08-26 PROCEDURE — 3051F HG A1C>EQUAL 7.0%<8.0%: CPT | Performed by: FAMILY MEDICINE

## 2020-08-26 PROCEDURE — 1036F TOBACCO NON-USER: CPT | Performed by: FAMILY MEDICINE

## 2020-08-26 PROCEDURE — G8417 CALC BMI ABV UP PARAM F/U: HCPCS | Performed by: FAMILY MEDICINE

## 2020-08-26 PROCEDURE — G0438 PPPS, INITIAL VISIT: HCPCS | Performed by: FAMILY MEDICINE

## 2020-08-26 PROCEDURE — 83036 HEMOGLOBIN GLYCOSYLATED A1C: CPT | Performed by: FAMILY MEDICINE

## 2020-08-26 PROCEDURE — G8399 PT W/DXA RESULTS DOCUMENT: HCPCS | Performed by: FAMILY MEDICINE

## 2020-08-26 PROCEDURE — 99203 OFFICE O/P NEW LOW 30 MIN: CPT | Performed by: FAMILY MEDICINE

## 2020-08-26 PROCEDURE — 4040F PNEUMOC VAC/ADMIN/RCVD: CPT | Performed by: FAMILY MEDICINE

## 2020-08-26 PROCEDURE — 1090F PRES/ABSN URINE INCON ASSESS: CPT | Performed by: FAMILY MEDICINE

## 2020-08-26 PROCEDURE — 1123F ACP DISCUSS/DSCN MKR DOCD: CPT | Performed by: FAMILY MEDICINE

## 2020-08-26 PROCEDURE — G8427 DOCREV CUR MEDS BY ELIG CLIN: HCPCS | Performed by: FAMILY MEDICINE

## 2020-08-26 RX ORDER — GLIPIZIDE 10 MG/1
10 TABLET ORAL 2 TIMES DAILY
Qty: 60 TABLET | Refills: 3 | Status: ON HOLD | OUTPATIENT
Start: 2020-08-26 | End: 2020-10-12 | Stop reason: HOSPADM

## 2020-08-26 ASSESSMENT — PATIENT HEALTH QUESTIONNAIRE - PHQ9
SUM OF ALL RESPONSES TO PHQ QUESTIONS 1-9: 0
SUM OF ALL RESPONSES TO PHQ QUESTIONS 1-9: 0

## 2020-08-26 ASSESSMENT — ENCOUNTER SYMPTOMS
NAUSEA: 0
SORE THROAT: 0
EYE PAIN: 0
EYE REDNESS: 0
BLOOD IN STOOL: 0
EYE DISCHARGE: 0
CONSTIPATION: 0
COUGH: 0
BACK PAIN: 0
WHEEZING: 0
VOMITING: 0
DIARRHEA: 0
SHORTNESS OF BREATH: 0

## 2020-08-26 ASSESSMENT — LIFESTYLE VARIABLES: HOW OFTEN DO YOU HAVE A DRINK CONTAINING ALCOHOL: 0

## 2020-08-26 NOTE — PROGRESS NOTES
Mango Mccarty is a 67 y.o. female that presents for     Chief Complaint   Patient presents with    Medicare AWV       /68   Pulse 62   Temp 98.5 °F (36.9 °C)   Resp 16   Ht 5' 0.5\" (1.537 m)   Wt 160 lb (72.6 kg)   BMI 30.73 kg/m²       HPI:    Diabetes Type 2    Glucose control:   Does patient check blood glucoses at home? Yes - increased a little recently due to stopping the metformin  Report of hypoglycemia: no  Lab Results   Component Value Date    LABA1C 7.8 06/17/2020     No results found for: EAG    Symptoms  Polyuria, Polydipsia or Polyphagia? No  Chest Pain, SOB, or Palpitations? -  No  New Vision complaints? No  Paresthesias of the extremities? No    Medications  Current medication were reviewed. Compliant with medications? yes  Medication side effects? No  On ACE-I or ARB? No  On antiplatelet therapy? Yes  On Statin? Yes    Exercise  Exercise? No  Wt Readings from Last 3 Encounters:   08/26/20 160 lb (72.6 kg)   08/20/20 160 lb (72.6 kg)   07/20/20 165 lb 6.4 oz (75 kg)       Diet discipline?:  Low salt, fat, sugar diet? yes    Blood pressure control:  BP Readings from Last 3 Encounters:   08/26/20 130/68   08/20/20 (!) 169/77 07/20/20 (!) 156/72       No results found for: Kaivtha Linares    Lab Results   Component Value Date    [address] 01/17/2017       Recently noted to have a mass of the left kidney. She is set up to see surgery for further evaluation. HTN    Does patient check BP regularly at home? - No  Current Medication regimen - lisinopril, Norvasc  Tolerating medications well? - yes    Shortness of breath or chest pain? No  Headache or visual complaints? No  Neurologic changes like confusion? No  Extremity edema?  Yes - 'a little'    BP Readings from Last 3 Encounters:   08/26/20 130/68   08/20/20 (!) 169/77 07/20/20 (!) 156/72         Health Maintenance   Topic Date Due    DTaP/Tdap/Td vaccine (1 - Tdap) 06/13/1963    Shingles Vaccine (1 of 2) 06/13/1994  Pneumococcal 65+ years Vaccine (1 of 1 - PPSV23) 06/13/2009    Lipid screen  01/17/2018    Annual Wellness Visit (AWV)  06/21/2019    Flu vaccine (1) 09/01/2020    Potassium monitoring  08/17/2021    Creatinine monitoring  08/17/2021    DEXA (modify frequency per FRAX score)  Completed    Hepatitis A vaccine  Aged Out    Hib vaccine  Aged Out    Meningococcal (ACWY) vaccine  Aged Out       Past Medical History:   Diagnosis Date    Cataracts, bilateral     Diabetes mellitus ([address]     Glaucoma     Hyperlipidemia     Hypertension     Nausea & vomiting        Past Surgical History:   Procedure Laterality Date    CHOLECYSTECTOMY  80    CORNEAL TRANSPLANT      HERNIA REPAIR  90    HYSTERECTOMY  80    INCISIONAL HERNIA REPAIR  10/28/2014    laparoscopic incisional herina repair w mesh--Dr. Quiana Lizarraga    LEG SURGERY Right 1962    FX W/PLATES & REMOVED    TONSILLECTOMY  as a child       Social History     Tobacco Use    Smoking status: Never Smoker    Smokeless tobacco: Never Used   Substance Use Topics    Alcohol use: No    Drug use: No       Family History   Problem Relation Age of Onset    Cancer Mother     Heart Disease Father     Diabetes Sister     Diabetes Brother          I have reviewed the patient's past medical history, past surgical history, allergies, medications, social and family history and I have made updates where appropriate. Review of Systems   Constitutional: Negative for chills and fever. HENT: Negative for congestion, ear pain, nosebleeds, sore throat and tinnitus. Eyes: Negative for pain, discharge and redness. Respiratory: Negative for cough, shortness of breath and wheezing. Cardiovascular: Negative for chest pain, palpitations and leg swelling. Gastrointestinal: Negative for blood in stool, constipation, diarrhea, nausea and vomiting. Endocrine: Negative for polydipsia. Genitourinary: Negative for dysuria, frequency, hematuria and urgency. Musculoskeletal: Negative for back pain and myalgias. Skin: Negative for rash. Allergic/Immunologic: Negative for environmental allergies. Neurological: Negative for dizziness, tremors, seizures, weakness and headaches. Hematological: Does not bruise/bleed easily. Psychiatric/Behavioral: Negative for hallucinations and suicidal ideas. The patient is not nervous/anxious. PHYSICAL EXAM:  /68   Pulse 62   Temp 98.5 °F (36.9 °C)   Resp 16   Ht 5' 0.5\" (1.537 m)   Wt 160 lb (72.6 kg)   BMI 30.73 kg/m²     General Appearance: well developed and well- nourished, in no acute distress  Head: normocephalic and atraumatic  ENT: external ear and ear canal normal bilaterally, nose without deformity, nasal mucosa and turbinates normal without polyps, oropharynx normal, dentition is normal for age, no lipor gum lesions noted  Neck: supple and non-tender without mass, no thyromegaly or thyroid nodules, no cervical lymphadenopathy  Pulmonary/Chest: clear to auscultation bilaterally- no wheezes, rales or rhonchi, normal air movement, no respiratory distress or retractions  Cardiovascular: normal rate, regular rhythm, normal S1 and S2, no murmurs, rubs, clicks, or gallops, distal pulses intact  Abdomen: soft, non-tender, non-distended, no rebound or guarding, no masses or hernias noted, no hepatospleenomegaly  Extremities: no cyanosis, clubbing or edema of the lower extremities  Psych:  Normal affect without evidence of depression oranxiety, insight and judgement are appropriate, memory appears intact  Skin: warm and dry, no rash or erythema      ASSESSMENT & PLAN  Jonel Coker was seen today for medicare awv.     Diagnoses and all orders for this visit:    Routine general medical examination at a health care facility    Type 2 diabetes mellitus with other diabetic kidney complication, without long-term current use of insulin (HCC)  -     POCT glycosylated hemoglobin (Hb A1C)  -     glipiZIDE (GLUCOTROL) 10 MG tablet; Take 1 tablet by mouth 2 times daily    Benign essential HTN    Left renal mass    -DM2 uncontrolled, stop amaryl, I'm going to change her to BID Glipizide given her eating habits, rechekc a1c in 3 months.  -Follow up with nephro and her surgeon regarding the renal mass  -Other chronic issues are stable, continue current medications  -Advised to call if any issues      Return in 3 months (on 11/26/2020) for Medicare Annual Wellness Visit in 1 year. Controlled Substance Monitoring:    Acute and Chronic Pain Monitoring:   No flowsheet data found. Frankyzaki Petersen received counseling on the following healthy behaviors: medication adherence  Reviewed prior labs and health maintenance. Continue current medications, diet and exercise. Discussed use, benefit, and side effects of prescribed medications. Barriers to medication compliance addressed. Patient given educational materials - see patient instructions. All patient questions answered. Patient voiced understanding.

## 2020-08-26 NOTE — PROGRESS NOTES
Medicare Annual Wellness Visit  Name: Ryan Johnson Date: 2020   MRN: [id number] Sex: Female   Age: 67 y.o. Ethnicity: Non-/Non    : 1945 Race: Serjio Nurse is here for Medicare AWV    Screenings for behavioral, psychosocial and functional/safety risks, and cognitive dysfunction are all negative except as indicated below. These results, as well as other patient data from the [address] form, are documented in Flowsheets linked to this Encounter. Allergies   Allergen Reactions    Relafen [Nabumetone] Nausea Only    Sulfa Antibiotics Nausea Only         Prior to Visit Medications    Medication Sig Taking? Authorizing Provider   glipiZIDE (GLUCOTROL) 10 MG tablet Take 1 tablet by mouth 2 times daily Yes Maranda Soto DO   vitamin D (ERGOCALCIFEROL) 1.25 MG (91731 UT) CAPS capsule Take 1 capsule by mouth once a week Yes Maciej Santamaria MD   calcium citrate-vitamin D (CITRICAL + D) 315-250 MG-UNIT TABS per tablet Take 1 tablet by mouth daily (with breakfast) Yes Historical Provider, MD   Vitamin D, Cholecalciferol, 25 MCG (1000 UT) CAPS Take 1 capsule by mouth 2 times daily  Yes Historical Provider, MD   vitamin C (ASCORBIC ACID) 500 MG tablet Take 500 mg by mouth as needed Yes Historical Provider, MD   timolol (TIMOPTIC) 0.5 % ophthalmic solution 1 drop 2 times daily Yes Historical Provider, MD   prednisoLONE acetate (PRED FORTE) 1 % ophthalmic suspension 2 drops daily Yes Historical Provider, MD   SITagliptin (JANUVIA) 100 MG tablet Take 100 mg by mouth daily  Yes Historical Provider, MD   amLODIPine (NORVASC) 10 MG tablet Take 10 mg by mouth daily Yes Historical Provider, MD   aspirin 325 MG tablet Take 325 mg by mouth daily. Yes Historical Provider, MD   Multiple Vitamins-Minerals (CENTRUM SILVER) TABS Take  by mouth daily. Yes Historical Provider, MD   lisinopril (PRINIVIL;ZESTRIL) 20 MG tablet Take 20 mg by mouth daily.    Yes Historical Provider, MD   pravastatin (PRAVACHOL) 40 MG tablet Take 40 mg by mouth nightly. Yes Historical Provider, MD         Past Medical History:   Diagnosis Date    Cataracts, bilateral     Diabetes mellitus ([address]     Glaucoma     Hyperlipidemia     Hypertension     Nausea & vomiting        Past Surgical History:   Procedure Laterality Date    CHOLECYSTECTOMY  80    CORNEAL TRANSPLANT      HERNIA REPAIR  90    HYSTERECTOMY  89    INCISIONAL HERNIA REPAIR  10/28/2014    laparoscopic incisional herina repair w mesh--Dr. Chase Mathis    LEG SURGERY Right 1962    FX W/PLATES & REMOVED    TONSILLECTOMY  as a child         Family History   Problem Relation Age of Onset    Cancer Mother     Heart Disease Father     Diabetes Sister     Diabetes Brother        CareTeam (Including outside providers/suppliers regularly involved in providing care):   Patient Care Team:  Gladis Arnett as PCP - General    Wt Readings from Last 3 Encounters:   08/26/20 160 lb (72.6 kg)   08/20/20 160 lb (72.6 kg)   07/20/20 165 lb 6.4 oz (75 kg)     Vitals:    08/26/20 1444   BP: 130/68   Pulse: 62   Resp: 16   Temp: 98.5 °F (36.9 °C)   Weight: 160 lb (72.6 kg)   Height: 5' 0.5\" (1.537 m)     Body mass index is 30.73 kg/m². Based upon direct observation of the patient, evaluation of cognition reveals recent and remote memory intact. Patient's complete Health Risk Assessment and screening values have been reviewed and are found in Flowsheets. The following problems were reviewed today and where indicated follow up appointments were made and/or referrals ordered. Positive Risk Factor Screenings with Interventions:     Fall Risk:  Timed Up and Go Test > 12 seconds?  (Complete if either Fall Risk answers are Yes): no  2 or more falls in past year?: (!) yes  Fall with injury in past year?: no  Fall Risk Interventions:    · Home safety tips provided    Health Habits/Nutrition:  Health Habits/Nutrition  Do you exercise for at least 20 minutes 2-3 times per week?: (!) No  Have you lost any weight without trying in the past 3 months?: (!) Yes  Do you eat fewer than 2 meals per day?: (!) Yes  Have you seen a dentist within the past year?: (!) No  Body mass index is 30.73 kg/m². Health Habits/Nutrition Interventions:  · diet and exercise discussed with patient today    Hearing/Vision:  No exam data present  Hearing/Vision  Do you or your family notice any trouble with your hearing?: (!) Yes  Do you have difficulty driving, watching TV, or doing any of your daily activities because of your eyesight?: No  Have you had an eye exam within the past year?: Yes  Hearing/Vision Interventions:  · Hearing concerns:  patient declines any further evaluation/treatment for hearing issues    Personalized Preventive Plan   Current Health Maintenance Status    There is no immunization history on file for this patient. Health Maintenance   Topic Date Due    DTaP/Tdap/Td vaccine (1 - Tdap) 06/13/1963    Shingles Vaccine (1 of 2) 06/13/1994    Pneumococcal 65+ years Vaccine (1 of 1 - PPSV23) 06/13/2009    Lipid screen  01/17/2018    Annual Wellness Visit (AWV)  06/21/2019    Flu vaccine (1) 09/01/2020    Potassium monitoring  08/17/2021    Creatinine monitoring  08/17/2021    DEXA (modify frequency per FRAX score)  Completed    Hepatitis A vaccine  Aged Out    Hib vaccine  Aged Out    Meningococcal (ACWY) vaccine  Aged Out     Recommendations for [employer] Due: see orders and patient instructions/AVS.  . Recommended screening schedule for the next 5-10 years is provided to the patient in written form: see Patient Instructions/AVS.    Westminster Donna was seen today for medicare awv.     Diagnoses and all orders for this visit:    Routine general medical examination at a health care facility    Type 2 diabetes mellitus with other diabetic kidney complication, without long-term current use of insulin (HCC)  -     POCT glycosylated hemoglobin (Hb A1C)  - glipiZIDE (GLUCOTROL) 10 MG tablet;  Take 1 tablet by mouth 2 times daily    Benign essential HTN    Left renal mass

## 2020-09-03 ENCOUNTER — OFFICE VISIT (OUTPATIENT)
Dept: UROLOGY | Age: 75
End: 2020-09-03
Payer: MEDICARE

## 2020-09-03 VITALS — WEIGHT: 160 LBS | BODY MASS INDEX: 31.41 KG/M2 | TEMPERATURE: 97.3 F | HEIGHT: 60 IN

## 2020-09-03 LAB
BILIRUBIN URINE: NEGATIVE
BLOOD URINE, POC: ABNORMAL
CHARACTER, URINE: CLEAR
COLOR, URINE: YELLOW
GLUCOSE URINE: 500 MG/DL
KETONES, URINE: NEGATIVE
LEUKOCYTE CLUMPS, URINE: NEGATIVE
NITRITE, URINE: NEGATIVE
PH, URINE: 6 (ref 5–9)
PROTEIN, URINE: >= 300 MG/DL
SPECIFIC GRAVITY, URINE: 1.02 (ref 1–1.03)
UROBILINOGEN, URINE: 0.2 EU/DL (ref 0–1)

## 2020-09-03 PROCEDURE — G8417 CALC BMI ABV UP PARAM F/U: HCPCS | Performed by: UROLOGY

## 2020-09-03 PROCEDURE — 81003 URINALYSIS AUTO W/O SCOPE: CPT | Performed by: UROLOGY

## 2020-09-03 PROCEDURE — 1090F PRES/ABSN URINE INCON ASSESS: CPT | Performed by: UROLOGY

## 2020-09-03 PROCEDURE — G8427 DOCREV CUR MEDS BY ELIG CLIN: HCPCS | Performed by: UROLOGY

## 2020-09-03 PROCEDURE — 99204 OFFICE O/P NEW MOD 45 MIN: CPT | Performed by: UROLOGY

## 2020-09-03 PROCEDURE — 4040F PNEUMOC VAC/ADMIN/RCVD: CPT | Performed by: UROLOGY

## 2020-09-03 PROCEDURE — 1123F ACP DISCUSS/DSCN MKR DOCD: CPT | Performed by: UROLOGY

## 2020-09-03 PROCEDURE — 1036F TOBACCO NON-USER: CPT | Performed by: UROLOGY

## 2020-09-03 PROCEDURE — G8399 PT W/DXA RESULTS DOCUMENT: HCPCS | Performed by: UROLOGY

## 2020-09-03 NOTE — PROGRESS NOTES
[address] [place] [address]  Dept: [phone]  Dept Fax: [phone] : [phone] [facility] Urology Office Note -     Patient:  Pj Peoples  YOB: 1945  Date: 9/3/2020    The patient is a 67 y.o. female who presents today for evaluation of the following problems: Renal mass  Chief Complaint   Patient presents with    Advice Only     new patient ref renal mass    referred/consultation requested by Lon Natarajan DO.    HISTORY OF PRESENT ILLNESS:     Renal mass  Onset was  Weeks ago. Overall, the problem(s) are unchanged. Severity is described as moderate. Associated Symptoms: No dysuria, no gross hematuria. Current Pain Severity: 0    Incidentally found by  Nephrology. She has renal disease. Imaging was ordered. Left renal mass- revealed on renal ultrasound and confirmed on CT scan. Kidney function is poor. Imaging reviewed with patient today. She reports left sided lower extremity swelling. Not on diuretic . She reports no SOB  She reports no CHF. Does not see a cardiologist.   She will see her PCP for this issue.          Requested/reviewed records from Lon Natarajan DO office and/or outside [de-identified]    (Patient's old records have been requested, reviewed and pertinent findings summarized in today's note.)    Procedures Today: N/A    Last several PSA's:  No results found for: PSA    Last total testosterone:  No results found for: TESTOSTERONE    Urinalysis today:  Results for POC orders placed in visit on 09/03/20   POCT Urinalysis No Micro (Auto)   Result Value Ref Range    Glucose, Ur 500 (A) NEGATIVE mg/dl    Bilirubin Urine Negative     Ketones, Urine Negative NEGATIVE    Specific Gravity, Urine 1.025 1.002 - 1.030    Blood, UA POC Trace-intact NEGATIVE    pH, Urine 6.00 5.0 - 9.0    Protein, Urine >= 300 (A) NEGATIVE mg/dl    Urobilinogen, Urine 0.20 0.0 - 1.0 eu/dl    Nitrite, Urine Negative NEGATIVE    Leukocyte Clumps, Urine Negative NEGATIVE    Color, Urine Yellow YELLOW-STRAW    Character, Urine Clear CLR-SL.CLOUD       Last BUN and creatinine:  Lab Results   Component Value Date    BUN 26 (H) 08/17/2020     Lab Results   Component Value Date    CREATININE 1.5 (H) 08/17/2020       Imaging Reviewed during this Office Visit:   Katherine López MD independently reviewed the images and verified the radiology reports from:    Ct Abdomen Pelvis Wo Contrast Additional Contrast? None    Result Date: 8/17/2020  PROCEDURE: CT ABDOMEN PELVIS WO CONTRAST CLINICAL INFORMATION: Left renal mass, CKD (chronic kidney disease), stage III ([address] GOYO positive, Nephrotic range proteinuria, Essential hypertension . COMPARISON: Renal ultrasound dated 7/9/2020. TECHNIQUE: Axial 5 mm CT images were obtained through the abdomen and pelvis. No contrast was given. Coronal and sagittal reconstructions were created. All CT scans at this facility use dose modulation, iterative reconstruction, and/or weight-based dosing when appropriate to reduce radiation dose to as low as reasonably achievable. FINDINGS:  There are mild linear opacities of the bilateral lung bases as evidence for atelectasis or scar. Visualized portions of lungs are otherwise clear. There is mitral valve calcification. Visualized portion of the unopacified heart is otherwise unremarkable. There are scattered calcified granulomas in the liver and spleen. The gallbladder is surgically absent. The unopacified liver is otherwise unremarkable. Adrenal glands are unremarkable. There is a 4.6 x 3.4 cm heterogeneous density mass at the junction of the interpolar and inferior pole region of the left kidney. This corresponds to the mass seen on previous ultrasound with internal vascularity. There is an exophytic cyst at the interpolar region of the right kidney.  There is a 6 mm calcification at the interpolar region of the left kidney. There is nonspecific perinephric fat stranding bilaterally. The pancreas is unremarkable. No retroperitoneal or mesenteric lymphadenopathy is identified. There are numerous diverticula within the large bowel without adjacent inflammation to suggest diverticulitis. Small bowel is grossly unremarkable. The unopacified bladder is nondistended. The uterus is surgically absent. No free fluid is identified. There is grade 1 anterolisthesis of L3 relative to L4 and L4 relative to L5 on the basis of degenerative change. 1. Redemonstration of a 4.6 cm heterogeneous mass in the left kidney concerning for malignancy. 2. No definite metastatic disease identified. 3. Colonic diverticulosis. 4. Nonobstructive nephrolithiasis on the left. **This report has been created using voice recognition software. It may contain minor errors which are inherent in voice recognition technology. ** Final report electronically signed by Dr. Aime Montaño MD on 8/17/2020 4:53 PM    KUB July 2020  Impression    1. Medical renal disease. 2. Bilateral renal cysts. 3. There is a 4.4 cm mass lesion inferior pole left kidney. 4. CT abdomen with and without contrast enhancement (CT urogram protocol) recommended for further evaluation.             PAST MEDICAL, FAMILY AND SOCIAL HISTORY:  Past Medical History:   Diagnosis Date    Cataracts, bilateral     Diabetes mellitus ([address]     Glaucoma     Hyperlipidemia     Hypertension     Nausea & vomiting      Past Surgical History:   Procedure Laterality Date    CHOLECYSTECTOMY  80    CORNEAL TRANSPLANT      HERNIA REPAIR  90    HYSTERECTOMY  89    INCISIONAL HERNIA REPAIR  10/28/2014    laparoscopic incisional herina repair w mesh--Dr. Piña Hem LEG SURGERY Right 1962    FX W/PLATES & REMOVED    TONSILLECTOMY  as a child     Family History   Problem Relation Age of Onset    Cancer Mother     Heart Disease Father     Diabetes Sister     Diabetes Brother acute distress; Neuro: alert and oriented to person place and time. Psych: Mood and affect normal.  Skin: warm, dry  Lungs: Respiratory effort normal, Symmetric chest rise  Cardiovascular:  Normal peripheral pulses; Regular rate. Abdomen: Soft, non-tender, non-distended with no CVA, flank pain, hepatosplenomegaly or hernia. Kidneys normal.  Bladder non-tender and not distended. Lymphatics: no palpable lymphadenopathy  Minimal/no edema in bilateral lower extremities        Assessment and Plan        1. Renal mass               Plan:        Renal mass- revealed on renal ultrasound. CT scan confirmed renal mass on left side. Will need surgical intervention. Prescriptions Ordered:  No orders of the defined types were placed in this encounter.      Orders Placed:  Orders Placed This Encounter   Procedures    POCT Urinalysis No Micro (Auto)            LUPILLO Colby MD

## 2020-09-11 ENCOUNTER — OFFICE VISIT (OUTPATIENT)
Dept: UROLOGY | Age: 75
End: 2020-09-11
Payer: MEDICARE

## 2020-09-11 ENCOUNTER — TELEPHONE (OUTPATIENT)
Dept: UROLOGY | Age: 75
End: 2020-09-11

## 2020-09-11 VITALS — WEIGHT: 160 LBS | BODY MASS INDEX: 31.41 KG/M2 | TEMPERATURE: 97.3 F | HEIGHT: 60 IN

## 2020-09-11 PROCEDURE — 1090F PRES/ABSN URINE INCON ASSESS: CPT | Performed by: UROLOGY

## 2020-09-11 PROCEDURE — G8399 PT W/DXA RESULTS DOCUMENT: HCPCS | Performed by: UROLOGY

## 2020-09-11 PROCEDURE — 99215 OFFICE O/P EST HI 40 MIN: CPT | Performed by: UROLOGY

## 2020-09-11 PROCEDURE — 4040F PNEUMOC VAC/ADMIN/RCVD: CPT | Performed by: UROLOGY

## 2020-09-11 PROCEDURE — G8427 DOCREV CUR MEDS BY ELIG CLIN: HCPCS | Performed by: UROLOGY

## 2020-09-11 PROCEDURE — 1123F ACP DISCUSS/DSCN MKR DOCD: CPT | Performed by: UROLOGY

## 2020-09-11 PROCEDURE — G8417 CALC BMI ABV UP PARAM F/U: HCPCS | Performed by: UROLOGY

## 2020-09-11 PROCEDURE — 1036F TOBACCO NON-USER: CPT | Performed by: UROLOGY

## 2020-09-11 NOTE — PROGRESS NOTES
Federica Burris MD  Urology Clinic Progress Note      Patient:  Jori Ying  YOB: 1945  Date: 9/11/2020    HISTORY OF PRESENT ILLNESS:   The patient is a 67 y.o. female who presents today for follow-up for the following problem(s):      1. Renal mass         Overall the problem(s) : are worsening. Associated Symptoms: No dysuria, gross hematuria. Pain Severity:      Summary of old records: seen Dr Gabriela Delacruz for renal mass, here to discuss surgery    Additional History: Onset 1 month, incidentally found  Severity is described as moderate to severe. The course of symptoms over time is insidious. Alleviating factors: none  Worsening factors: none  Lower urinary tract symptoms: non bothersome  CKD  Julianna, hysterectomy, hernia    I independently reviewed and verified the images and reports from:    Ct Abdomen Pelvis Wo Contrast Additional Contrast? None    Result Date: 8/17/2020  PROCEDURE: CT ABDOMEN PELVIS WO CONTRAST CLINICAL INFORMATION: Left renal mass, CKD (chronic kidney disease), stage III ([address] GOYO positive, Nephrotic range proteinuria, Essential hypertension . COMPARISON: Renal ultrasound dated 7/9/2020. TECHNIQUE: Axial 5 mm CT images were obtained through the abdomen and pelvis. No contrast was given. Coronal and sagittal reconstructions were created. All CT scans at this facility use dose modulation, iterative reconstruction, and/or weight-based dosing when appropriate to reduce radiation dose to as low as reasonably achievable. FINDINGS:  There are mild linear opacities of the bilateral lung bases as evidence for atelectasis or scar. Visualized portions of lungs are otherwise clear. There is mitral valve calcification. Visualized portion of the unopacified heart is otherwise unremarkable. There are scattered calcified granulomas in the liver and spleen. The gallbladder is surgically absent. The unopacified liver is otherwise unremarkable. Adrenal glands are unremarkable.  There is a 4.6 x 3.4 cm heterogeneous density mass at the junction of the interpolar and inferior pole region of the left kidney. This corresponds to the mass seen on previous ultrasound with internal vascularity. There is an exophytic cyst at the interpolar region of the right kidney. There is a 6 mm calcification at the interpolar region of the left kidney. There is nonspecific perinephric fat stranding bilaterally. The pancreas is unremarkable. No retroperitoneal or mesenteric lymphadenopathy is identified. There are numerous diverticula within the large bowel without adjacent inflammation to suggest diverticulitis. Small bowel is grossly unremarkable. The unopacified bladder is nondistended. The uterus is surgically absent. No free fluid is identified. There is grade 1 anterolisthesis of L3 relative to L4 and L4 relative to L5 on the basis of degenerative change. 1. Redemonstration of a 4.6 cm heterogeneous mass in the left kidney concerning for malignancy. 2. No definite metastatic disease identified. 3. Colonic diverticulosis. 4. Nonobstructive nephrolithiasis on the left. **This report has been created using voice recognition software. It may contain minor errors which are inherent in voice recognition technology. ** Final report electronically signed by Dr. Pooja Jason MD on 8/17/2020 4:53 PM      Imaging Reviewed during this Office Visit:   (results were independently reviewed by physician and radiology report verified)    Urinalysis today:  No results found for this visit on 09/11/20.     Last BUN and creatinine:  Lab Results   Component Value Date    BUN 26 (H) 08/17/2020     Lab Results   Component Value Date    CREATININE 1.5 (H) 08/17/2020       PAST MEDICAL, FAMILY AND SOCIAL HISTORY UPDATE:  Past Medical History:   Diagnosis Date    Cataracts, bilateral     Diabetes mellitus ([address]     Glaucoma     Hyperlipidemia     Hypertension     Nausea & vomiting      Past Surgical History:   Procedure Laterality Date    CHOLECYSTECTOMY  80    CORNEAL TRANSPLANT      HERNIA REPAIR  80    HYSTERECTOMY  80    INCISIONAL HERNIA REPAIR  10/28/2014    laparoscopic incisional herina repair w mesh--Dr. Hughes Hasting Right 1962    FX W/PLATES & REMOVED    TONSILLECTOMY  as a child     Family History   Problem Relation Age of Onset    Cancer Mother     Heart Disease Father     Diabetes Sister     Diabetes Brother      Outpatient Medications Marked as Taking for the 9/11/20 encounter (Office Visit) with Ramakrishna Carranza MD   Medication Sig Dispense Refill    glipiZIDE (GLUCOTROL) 10 MG tablet Take 1 tablet by mouth 2 times daily 60 tablet 3    vitamin D (ERGOCALCIFEROL) 1.25 MG (48968 UT) CAPS capsule Take 1 capsule by mouth once a week 12 capsule 0    calcium citrate-vitamin D (CITRICAL + D) 315-250 MG-UNIT TABS per tablet Take 1 tablet by mouth daily (with breakfast)      Vitamin D, Cholecalciferol, 25 MCG (1000 UT) CAPS Take 1 capsule by mouth 2 times daily       vitamin C (ASCORBIC ACID) 500 MG tablet Take 500 mg by mouth as needed      timolol (TIMOPTIC) 0.5 % ophthalmic solution 1 drop 2 times daily      prednisoLONE acetate (PRED FORTE) 1 % ophthalmic suspension 2 drops daily      SITagliptin (JANUVIA) 100 MG tablet Take 100 mg by mouth daily       amLODIPine (NORVASC) 10 MG tablet Take 10 mg by mouth daily      aspirin 325 MG tablet Take 325 mg by mouth daily.  Multiple Vitamins-Minerals (CENTRUM SILVER) TABS Take  by mouth daily.  lisinopril (PRINIVIL;ZESTRIL) 20 MG tablet Take 20 mg by mouth daily.  pravastatin (PRAVACHOL) 40 MG tablet Take 40 mg by mouth nightly.          Relafen [nabumetone] and Sulfa antibiotics  Social History     Tobacco Use   Smoking Status Never Smoker   Smokeless Tobacco Never Used       Social History     Substance and Sexual Activity   Alcohol Use No       REVIEW OF SYSTEMS:  Constitutional: negative  Eyes: negative  Respiratory: negative  Cardiovascular: negative  Gastrointestinal: negative  Genitourinary: negative except for what is in HPI  Musculoskeletal: negative  Skin: negative   Neurological: negative  Hematological/Lymphatic: negative  Psychological: negative    Physical Exam:      Vitals:    09/11/20 1229   Temp: 97.3 °F (36.3 °C)     Patient is a 67 y.o. female in no acute distress and alert and oriented to person, place and time. NAD, Alert  Non labored respiration  Normal peripheral pulses  Soft, non tender  Skin-warm and dry  Psych- normal mood and affect    Assessment and Plan      1. Renal mass           Plan:      I discussed and reviewed the images with the patient. I discussed al the options including observation, ablative treatment (cryo or RF ablation), radical and partial nephrectomy. I discussed all the risks, benefits, possible outcomes and complications of the above options. I answered all the patient's questions. For partial nephrectomy, I discussed risk of bleeding, conversion to radical nephrectomy or conversion to open approach. Discussed risk of dialysis or worsening renal function given her CKD    The patient would like to proceed with surgery. Will arrange for Robotic partial left nephrectomy, possible radical, possible open.                Monica Disla MD  [facility] Urology

## 2020-09-11 NOTE — TELEPHONE ENCOUNTER
Patient scheduled for surgery with Dr Stefania Calderon on 10/8/20. Surgery consent signed. Patient give pre op EKG and chest xray to do right away. Patient will do pre op fasting labs, urinalysis and Covid 19 on 10/1/20. Surgery instructions to be mailed to the patient. Will look at results of EKG to see if the patient might need PCP clearance.

## 2020-09-14 NOTE — TELEPHONE ENCOUNTER
SURGERY [address]  [address] [place] [address] [address] [place] [address] [address] JULITO JOSE AM OFFENEGG II.CARLOS, [address]      Phone *[phone] *[phone]   Surgical Scheduling Direct Line Phone *[phone] Fax *[phone] 1945 female    [facility] [address]   Marital Status:          Home Phone: [phone]      Cell Phone:    Telephone Information:   Mobile [phone]          Surgeon: Dr. Johana Miguel  Surgery Date: 10/8/20   Time: 7:30 am    Procedure: Robotic assisted laparoscopic left partial nephrectomy,possible radical,possible open    Diagnosis:  Left Renal Mass                  2-2.5  HOURS    Important Medical History: In Epic    Special Inst/Equip: XI    CPT Codes:    97560  Latex Allergy:  No     Cardiac Device:  No     Anesthesia:  Anesthesiologist (General/Spinal)          Admission Type:  Same Day/OBS                             Admit Prior to Day of Surgery: No    Case Location:  Main OR           Preadmission Testing:Phone Call              PAT Date and Time:______________________________________________________    PAT Confirmation #: ______________________________________________________    Post Op Visit: ___________________________________________________________    Need Preop Cardiac Clearance: No    Does Patient have Cardiologist/physician?      None    Surgery Confirmation #: __________________________________________________    : ________________________   Date: __________________________     [employer] Name: Medicare

## 2020-09-14 NOTE — TELEPHONE ENCOUNTER
[address] [facility]rology  [facility]  [address] [place] [address], [facility]  [phone]    START BOWEL PREP ON 10/7/20    Surgery Bowel Preparation    Purchase a 10 ounce bottle of Magnesium Citrate at your pharmacy and drink the afternoon before your scheduled surgery. Start drinking approximately 2:00pm.  Suggest drinking it chilled with Sprite or Ginger Ale. Start a clear liquid diet (for dinner) the evening before surgery. You may have the following:   Water   Apple, grape or cranberry juice   Clear, fat free broth   Clear sodas (7-up, Sprite)   Plain gelatin (Jell-o)   Popsicles without bits of fruit or fruit pulp   Tea or coffee without milk or cream    Nothing to eat or drink after midnight before your scheduled surgery. Do a fleets enema the night prior to your surgery between 7:00pm and 8:00pm    Please contact our office at [phone] if you have any questions.

## 2020-09-14 NOTE — TELEPHONE ENCOUNTER
DO NOT TAKE ASPIRIN, PLAVIX, FISH OIL, COUMADIN, IBUPROFEN, MOTRIN-LIKE DRUGS AND ANY MULTIVITAMINS OR OVER THE COUNTER SUPPLEMENTS 5 DAYS PRIOR TO SURGERY AND 3 DAYS FOLLOWING     Ladonna Mccarty 1945 Diagnosis: Renal Mass    Surgical Physician: Dr. Delvis Clinton have been scheduled for the procedure marked below:      Surgery: Robotic assisted laparoscopic left partial nephrectomy,possible radical,possible open           Date: 10/8/20     Anesthesia: Anesthesiologist (General/Spinal)     Place of Service: [facility] Floor Same Day Surgery           Arrive to same day surgery by:  6:00 am  (Surgery time is subject to change)        INSTRUCTIONS AS MARKED BELOW:    1.  DO NOT eat or drink anything after midnight before surgery. 2.  We prefer you shower or bathe with an antibacterial soap (Dial) the morning of surgery. 3.  Plan to spend the overnight at the hospital the day of surgery. 4.  Please bring a current medication list, photo ID and insurance card(s) with you  5. Okay to take Tylenol  6. If you take Glucophage, Metformin or Janumet, hold 48-hours prior to surgery  7. Take blood pressure or heart medication as directed, if taken in the morning take with a small sip of water  8. Please do the pre op fasting labs,urinalysis and Covid 19 on 10/1/20. These are date specific so results are back of the surgery can be cancelled. Orders included. 9.  Do the bowel pre on 10/7/20. Instructions included. 10. You should receive a follow up appointment upon discharge from the hospital.  If you do not the office will call in 1-2 days to schedule.       (Covid-19 screening is date sensitive and can only be done 5 to 7 days prior to your procedure)    Date: 9/14/2020

## 2020-09-15 ENCOUNTER — HOSPITAL ENCOUNTER (OUTPATIENT)
Dept: GENERAL RADIOLOGY | Age: 75
Discharge: HOME | End: 2020-09-15
Payer: MEDICARE

## 2020-09-15 ENCOUNTER — HOSPITAL ENCOUNTER (OUTPATIENT)
Age: 75
Discharge: HOME | End: 2020-09-15
Payer: MEDICARE

## 2020-09-15 LAB
EKG ATRIAL RATE: 78 BPM
EKG P AXIS: 46 DEGREES
EKG P-R INTERVAL: 164 MS
EKG Q-T INTERVAL: 370 MS
EKG QRS DURATION: 82 MS
EKG QTC CALCULATION (BAZETT): 421 MS
EKG R AXIS: 28 DEGREES
EKG T AXIS: 72 DEGREES
EKG VENTRICULAR RATE: 78 BPM

## 2020-09-15 PROCEDURE — 71046 X-RAY EXAM CHEST 2 VIEWS: CPT

## 2020-09-15 PROCEDURE — 93005 ELECTROCARDIOGRAM TRACING: CPT

## 2020-09-15 PROCEDURE — 93010 ELECTROCARDIOGRAM REPORT: CPT | Performed by: NUCLEAR MEDICINE

## 2020-09-25 PROBLEM — Z00.00 ROUTINE GENERAL MEDICAL EXAMINATION AT A HEALTH CARE FACILITY: Status: RESOLVED | Noted: 2020-08-26 | Resolved: 2020-09-25

## 2020-09-28 RX ORDER — PRAVASTATIN SODIUM 40 MG
40 TABLET ORAL NIGHTLY
Qty: 30 TABLET | Status: CANCELLED | OUTPATIENT
Start: 2020-09-28

## 2020-09-28 RX ORDER — LISINOPRIL 20 MG/1
20 TABLET ORAL DAILY
Qty: 30 TABLET | Status: CANCELLED | OUTPATIENT
Start: 2020-09-28

## 2020-09-28 NOTE — TELEPHONE ENCOUNTER
Please approve or refuse Rx request:  Requested Prescriptions     Pending Prescriptions Disp Refills    SITagliptin (JANUVIA) 100 MG tablet 30 tablet      Sig: Take 1 tablet by mouth daily    pravastatin (PRAVACHOL) 40 MG tablet 30 tablet      Sig: Take 1 tablet by mouth nightly    lisinopril (PRINIVIL;ZESTRIL) 20 MG tablet 30 tablet      Sig: Take 1 tablet by mouth daily       Next appointment:  11/30/2020

## 2020-09-28 NOTE — TELEPHONE ENCOUNTER
Patient was last seen 8/26/2020 and her next appt is 11/30/2020 with Dr Zapata Standing. She is calling in for refills of her lisinopril 20 mg once daily, pravastatin 40 mg once daily and januvia 100 mg once daily to [facility]. Please verify and send.

## 2020-09-29 ENCOUNTER — HOSPITAL ENCOUNTER (OUTPATIENT)
Age: 75
Discharge: HOME | End: 2020-09-29
Payer: MEDICARE

## 2020-09-29 LAB
ANION GAP SERPL CALCULATED.3IONS-SCNC: 12 MEQ/L (ref 8–16)
BUN BLDV-MCNC: 26 MG/DL (ref 7–22)
CALCIUM SERPL-MCNC: 10.4 MG/DL (ref 8.5–10.5)
CHLORIDE BLD-SCNC: 105 MEQ/L (ref 98–111)
CO2: 27 MEQ/L (ref 23–33)
CREAT SERPL-MCNC: 1.7 MG/DL (ref 0.4–1.2)
CREATININE URINE: 86.4 MG/DL
ERYTHROCYTE [DISTWIDTH] IN BLOOD BY AUTOMATED COUNT: 12.4 % (ref 11.5–14.5)
ERYTHROCYTE [DISTWIDTH] IN BLOOD BY AUTOMATED COUNT: 41.2 FL (ref 35–45)
GFR SERPL CREATININE-BSD FRML MDRD: 29 ML/MIN/1.73M2
GLUCOSE BLD-MCNC: 255 MG/DL (ref 70–108)
HCT VFR BLD CALC: 36.1 % (ref 37–47)
HEMOGLOBIN: 11.6 GM/DL (ref 12–16)
MCH RBC QN AUTO: 29.4 PG (ref 26–33)
MCHC RBC AUTO-ENTMCNC: 32.1 GM/DL (ref 32.2–35.5)
MCV RBC AUTO: 91.6 FL (ref 81–99)
PLATELET # BLD: 222 THOU/MM3 (ref 130–400)
PMV BLD AUTO: 9.5 FL (ref 9.4–12.4)
POTASSIUM SERPL-SCNC: 5.1 MEQ/L (ref 3.5–5.2)
PROT/CREAT RATIO, UR: 7.93
PROTEIN, URINE: 685 MG/DL
RBC # BLD: 3.94 MILL/MM3 (ref 4.2–5.4)
SODIUM BLD-SCNC: 144 MEQ/L (ref 135–145)
WBC # BLD: 6.9 THOU/MM3 (ref 4.8–10.8)

## 2020-09-29 PROCEDURE — 85027 COMPLETE CBC AUTOMATED: CPT

## 2020-09-29 PROCEDURE — 82570 ASSAY OF URINE CREATININE: CPT

## 2020-09-29 PROCEDURE — 36415 COLL VENOUS BLD VENIPUNCTURE: CPT

## 2020-09-29 PROCEDURE — 80048 BASIC METABOLIC PNL TOTAL CA: CPT

## 2020-09-29 PROCEDURE — 84156 ASSAY OF PROTEIN URINE: CPT

## 2020-09-29 RX ORDER — LISINOPRIL 20 MG/1
20 TABLET ORAL DAILY
Qty: 30 TABLET | Refills: 2 | Status: ON HOLD | OUTPATIENT
Start: 2020-09-29 | End: 2020-10-12 | Stop reason: HOSPADM

## 2020-09-29 RX ORDER — PRAVASTATIN SODIUM 40 MG
40 TABLET ORAL NIGHTLY
Qty: 30 TABLET | Refills: 2 | Status: SHIPPED | OUTPATIENT
Start: 2020-09-29 | End: 2021-01-01

## 2020-10-01 ENCOUNTER — OFFICE VISIT (OUTPATIENT)
Dept: NEPHROLOGY | Age: 75
End: 2020-10-01
Payer: MEDICARE

## 2020-10-01 VITALS
SYSTOLIC BLOOD PRESSURE: 177 MMHG | DIASTOLIC BLOOD PRESSURE: 76 MMHG | WEIGHT: 165 LBS | HEART RATE: 69 BPM | BODY MASS INDEX: 32.39 KG/M2 | TEMPERATURE: 97.7 F | HEIGHT: 60 IN | OXYGEN SATURATION: 99 %

## 2020-10-01 PROCEDURE — G8484 FLU IMMUNIZE NO ADMIN: HCPCS | Performed by: INTERNAL MEDICINE

## 2020-10-01 PROCEDURE — 99214 OFFICE O/P EST MOD 30 MIN: CPT | Performed by: INTERNAL MEDICINE

## 2020-10-01 PROCEDURE — 1123F ACP DISCUSS/DSCN MKR DOCD: CPT | Performed by: INTERNAL MEDICINE

## 2020-10-01 PROCEDURE — G8427 DOCREV CUR MEDS BY ELIG CLIN: HCPCS | Performed by: INTERNAL MEDICINE

## 2020-10-01 PROCEDURE — G8417 CALC BMI ABV UP PARAM F/U: HCPCS | Performed by: INTERNAL MEDICINE

## 2020-10-01 PROCEDURE — 1036F TOBACCO NON-USER: CPT | Performed by: INTERNAL MEDICINE

## 2020-10-01 PROCEDURE — 4040F PNEUMOC VAC/ADMIN/RCVD: CPT | Performed by: INTERNAL MEDICINE

## 2020-10-01 PROCEDURE — 1090F PRES/ABSN URINE INCON ASSESS: CPT | Performed by: INTERNAL MEDICINE

## 2020-10-01 PROCEDURE — G8399 PT W/DXA RESULTS DOCUMENT: HCPCS | Performed by: INTERNAL MEDICINE

## 2020-10-01 NOTE — PROGRESS NOTES
In preparation for their surgical procedure above patient was screened for Obstructive Sleep Apnea (J CARLOS) using the STOP-Bang Questionnaire by the Pre-Admission Testing department. This is a pre-surgical screening tool for patient safety and serves as a recommendation, this WILL NOT cause cancellation of surgery. STOP-Bang Questionnaire  * Do you currently see a pulmonologist?  No     If yes STOP, do not complete. Patient follows with Dr.     1.  Do you snore loudly (able to be heard in the next room)? No    2. Do you often feel tired or sleepy during the daytime? No       3. Has anyone ever told you that you stop breathing during your sleep? No    4. Do you have or are you being treated for high blood pressure? Yes      5. BMI more than 35? No    6. Age over 48 years? 67 y.o. Yes    7. Neck Circumference greater than 17 inches for male or 16 inches for female? Measured           (visits only)            Not Applicable    8. Gender Male? No      TOTAL SCORE: 2    J CARLOS - Low Risk : Yes to 0 - 2 questions  J CARLOS - Intermediate Risk : Yes to 3 - 4 questions  J CARLOS - High Risk : Yes to 5 - 8 questions    Adapted from:   STOP Questionnaire: A Tool to Screen Patients for Obstructive Sleep Apnea   LUZ Smith.P.C., RICKY Valladares.B.B.S., Venancio Avery M.D., Marques Brown. Dez Hansen, Ph.D., RICKY Sharma.B.B.S., Ruel Benson, M.Sc., Rachel Rhoades M.D., [facility] [place]. [facility] Hernandez.P.C[facility]    Anesthesiology 2008; 355:146-24 Copyright 2008, the [address] of Anesthesiologists, [facility] 37.   ----------------------------------------------------------------------------------------------------------------

## 2020-10-01 NOTE — PROGRESS NOTES
Following instructions given to patient, who states understanding:     NPO after midnight  [employer] and 's license  Wear comfortable clean clothing  Do not bring jewelry   Shower night before and morning of surgery with a liquid antibacterial soap  Bring medications in original bottles  Follow all instructions given by your physician   needed at discharge  Call [phone] for any questions  Report to [facility] on 2nd floor  If you would become ill prior to surgery, please call the surgeon  May have only 1 visitor accompany you for surgery  Please bring and wear mask  You will be receiving a phone call one or two days before surgery to review covid screening exposure     Covid screening questionnaire complete and negative for symptoms or exposure see chart for documentation.      Patient coming to [facility] 10/2/20 for labs and COVID test    Please limit your exposure to the public after you have your covid test  Please call your doctor immediately if you develop any symptoms of covid prior to your surgery

## 2020-10-01 NOTE — PROGRESS NOTES
[address] [facility]  [address]  [facility] [address]  Dept: [phone]  Loc: [phone]  Office Progress Note  10/1/2020 11:16 AM      Pt Name:    Yg Hughes  YOB: 1945  Primary Care Physician:  Shante Spangler DO     Chief Complaint:   Chief Complaint   Patient presents with    Follow-up     Proteinuria and renal mass follow-up        Background Information/Interval History:   The patient is a 67 y.o. pleasant white female with hx DM since 1996, HTN who is here for follow-up evaluation of proteinuria and renal mass. She reports hx some leg swelling. She had corneal transplant Right eye 2017. She sees bubbles in urine. She takes ibuprofen as needed for left knee pain but not regularly. Sugars are generally stable but it was in 200 range previously but were up to 300s when she was initially diagnosed. She is on norvasc and lisinopril for HTN. Here for follow-up. BP is high. Today it is 302 systolic. She is on norvasc and lisinopril. No chest pain or shortness of breath, nausea or vomiting. No leg swelling. She is anxious due to upcoming surgery. Noted to have left renal mass and will be underlying surgery next week.       Past History:  Past Medical History:   Diagnosis Date    Cataracts, bilateral     Diabetes mellitus ([address]     Glaucoma     Hyperlipidemia     Hypertension     Nausea & vomiting      Past Surgical History:   Procedure Laterality Date    CHOLECYSTECTOMY  80    CORNEAL TRANSPLANT      HERNIA REPAIR  90    HYSTERECTOMY  89    INCISIONAL HERNIA REPAIR  10/28/2014    laparoscopic incisional herina repair w mesh--Dr. Gurwinder Alarcon    LEG SURGERY Right 1962    FX W/PLATES & REMOVED    TONSILLECTOMY  as a child        VITALS:  BP (!) 177/76 (Site: Left Upper Arm, Position: Sitting, Cuff Size: Small Adult)   Pulse 69   Temp 97.7 °F (36.5 °C)   Ht 5' (1.524 m)   Wt 165 lb (74.8 kg)   SpO2 99% BMI 32.22 kg/m²   Wt Readings from Last 3 Encounters:   10/01/20 165 lb (74.8 kg)   09/11/20 160 lb (72.6 kg)   09/03/20 160 lb (72.6 kg)     Body mass index is 32.22 kg/m². General Appearance: alert and cooperative with exam, appears comfortable, no distress  Oral: moist oral mucus membranes  Neck: No jugular venous distention  Lungs: Air entry B/L, no crackles or rales, no use of accessory muscles  Heart: S1, S2 heard  GI: soft, non-tender, no guarding  Extremities: No sig LE edema     Medications:  Current Outpatient Medications   Medication Sig Dispense Refill    LODOXAMIDE TROMETHAMINE OP Apply to eye Right eye only      SITagliptin (JANUVIA) 100 MG tablet Take 1 tablet by mouth daily 30 tablet 2    pravastatin (PRAVACHOL) 40 MG tablet Take 1 tablet by mouth nightly 30 tablet 2    lisinopril (PRINIVIL;ZESTRIL) 20 MG tablet Take 1 tablet by mouth daily 30 tablet 2    glipiZIDE (GLUCOTROL) 10 MG tablet Take 1 tablet by mouth 2 times daily 60 tablet 3    vitamin D (ERGOCALCIFEROL) 1.25 MG (93389 UT) CAPS capsule Take 1 capsule by mouth once a week 12 capsule 0    calcium citrate-vitamin D (CITRICAL + D) 315-250 MG-UNIT TABS per tablet Take 1 tablet by mouth daily (with breakfast)      Vitamin D, Cholecalciferol, 25 MCG (1000 UT) CAPS Take 1 capsule by mouth 2 times daily       vitamin C (ASCORBIC ACID) 500 MG tablet Take 500 mg by mouth as needed      timolol (TIMOPTIC) 0.5 % ophthalmic solution 1 drop 2 times daily      prednisoLONE acetate (PRED FORTE) 1 % ophthalmic suspension 2 drops daily      amLODIPine (NORVASC) 10 MG tablet Take 10 mg by mouth daily      aspirin 325 MG tablet Take 325 mg by mouth daily.  Multiple Vitamins-Minerals (CENTRUM SILVER) TABS Take  by mouth daily. No current facility-administered medications for this visit. Laboratory & Diagnostics:  Old progress notes from referring physician reviewed. Old labs reviewed:   Oct 2014: k 4.3, Creat 0.9  June 2020: UPCR 8 grams/g, Hb AIC 7.8%  24 hr protein 5.5 grams  GOYO: speckled pattern  UA: +300 protein, no blood  JULIET/Complements/Anti GBM: all normal    HbAIC 7.8%  US: B/L renal cysts, Left renal mass 4.4 cm  July 2020: K 4.6, Creat 1.9, Ca 10.2, Albumin 4.2, 24 hr protein 5.5 grams  Aug 2020: creat 1.5, K 4.2, glucose 262, UCR 8.69 g/g, Positive GOYO    Sept 2020; K 5.1, Creat 1.7, Hb 11.6, UPCR 7.93 g/g     Impression/Plan:   1. Nephrotic range proteinuria: Appears mostly due to diabetic nephropathy but cannot rule out other causes. Serologies returned and came back positive for GOYO with normal complements. All serologies were normal except elevated GOYO. Discussed with patient. This could be secondary to diabetic nephropathy but cannot rule out other causes. Primary membranous versus secondary membranous are also in the differentials. Patient is scheduled for partial nephron sparing nephrectomy next week by Dr. Jenna Espaan. I discussed the case with Dr. Jenna Espana and have requested his assistance to do intraoperative kidney biopsy from a normal parenchymal part of the kidney. The specimen will be sent to [facility] for normal kidney biopsy evaluation of nephrotic range proteinuria. 2. Left renal mass: CT scan shows 4.6 cm left renal mass. This is highly suspicious for malignancy. Patient is scheduled for nephron sparing nephrectomy next week  3. Positive GOYO: Will be seeing rheumatology  4. DM with proteinuria: Continue with lisinopril  5. CKD III : Creatinine down to 1.5. Overall stable. Patient understands that she will likely have elevated creatinine post nephrectomy. 6. HTN: continue with lisinopril and norvasc, advised low-salt diet  7. Hx Cataracts  8.  Mild leg swelling    Discussed with patient and family member  Discussed with urology     High complexity decision making    Orders Placed This Encounter   Procedures    Basic Metabolic Panel    Protein / creatinine ratio, urine    PTH, Intact  Phosphorus    Hemoglobin and Hematocrit, Blood     Return in about 2 months (around 12/1/2020).     Cj Eli MD  [facility]

## 2020-10-02 ENCOUNTER — HOSPITAL ENCOUNTER (OUTPATIENT)
Age: 75
Discharge: HOME | End: 2020-10-02
Payer: MEDICARE

## 2020-10-02 LAB
ANION GAP SERPL CALCULATED.3IONS-SCNC: 14 MEQ/L (ref 8–16)
BACTERIA: ABNORMAL /HPF
BASOPHILS # BLD: 0.6 %
BASOPHILS ABSOLUTE: 0 THOU/MM3 (ref 0–0.1)
BILIRUBIN URINE: NEGATIVE
BLOOD, URINE: NEGATIVE
BUN BLDV-MCNC: 30 MG/DL (ref 7–22)
CALCIUM SERPL-MCNC: 10.7 MG/DL (ref 8.5–10.5)
CASTS 2: ABNORMAL /LPF
CASTS UA: ABNORMAL /LPF
CHARACTER, URINE: CLEAR
CHLORIDE BLD-SCNC: 103 MEQ/L (ref 98–111)
CO2: 26 MEQ/L (ref 23–33)
COLOR: YELLOW
CREAT SERPL-MCNC: 1.4 MG/DL (ref 0.4–1.2)
CRYSTALS, UA: ABNORMAL
EOSINOPHIL # BLD: 4.2 %
EOSINOPHILS ABSOLUTE: 0.3 THOU/MM3 (ref 0–0.4)
EPITHELIAL CELLS, UA: ABNORMAL /HPF
ERYTHROCYTE [DISTWIDTH] IN BLOOD BY AUTOMATED COUNT: 12.4 % (ref 11.5–14.5)
ERYTHROCYTE [DISTWIDTH] IN BLOOD BY AUTOMATED COUNT: 41.4 FL (ref 35–45)
GFR SERPL CREATININE-BSD FRML MDRD: 37 ML/MIN/1.73M2
GLUCOSE BLD-MCNC: 227 MG/DL (ref 70–108)
GLUCOSE URINE: 100 MG/DL
HCT VFR BLD CALC: 37.1 % (ref 37–47)
HEMOGLOBIN: 11.9 GM/DL (ref 12–16)
IMMATURE GRANS (ABS): 0.02 THOU/MM3 (ref 0–0.07)
IMMATURE GRANULOCYTES: 0.3 %
KETONES, URINE: NEGATIVE
LEUKOCYTE ESTERASE, URINE: NEGATIVE
LYMPHOCYTES # BLD: 14.4 %
LYMPHOCYTES ABSOLUTE: 0.9 THOU/MM3 (ref 1–4.8)
MCH RBC QN AUTO: 29.5 PG (ref 26–33)
MCHC RBC AUTO-ENTMCNC: 32.1 GM/DL (ref 32.2–35.5)
MCV RBC AUTO: 92.1 FL (ref 81–99)
MISCELLANEOUS 2: ABNORMAL
MONOCYTES # BLD: 5.8 %
MONOCYTES ABSOLUTE: 0.4 THOU/MM3 (ref 0.4–1.3)
NITRITE, URINE: NEGATIVE
NUCLEATED RED BLOOD CELLS: 0 /100 WBC
PH UA: 5.5 (ref 5–9)
PLATELET # BLD: 237 THOU/MM3 (ref 130–400)
PMV BLD AUTO: 9.9 FL (ref 9.4–12.4)
POTASSIUM SERPL-SCNC: 4.6 MEQ/L (ref 3.5–5.2)
PROTEIN UA: >= 300
RBC # BLD: 4.03 MILL/MM3 (ref 4.2–5.4)
RBC URINE: ABNORMAL /HPF
RENAL EPITHELIAL, UA: ABNORMAL
SEG NEUTROPHILS: 74.7 %
SEGMENTED NEUTROPHILS ABSOLUTE COUNT: 4.7 THOU/MM3 (ref 1.8–7.7)
SODIUM BLD-SCNC: 143 MEQ/L (ref 135–145)
SPECIFIC GRAVITY, URINE: 1.02 (ref 1–1.03)
UROBILINOGEN, URINE: 0.2 EU/DL (ref 0–1)
WBC # BLD: 6.3 THOU/MM3 (ref 4.8–10.8)
WBC UA: ABNORMAL /HPF
YEAST: ABNORMAL

## 2020-10-02 PROCEDURE — 36415 COLL VENOUS BLD VENIPUNCTURE: CPT

## 2020-10-02 PROCEDURE — U0003 INFECTIOUS AGENT DETECTION BY NUCLEIC ACID (DNA OR RNA); SEVERE ACUTE RESPIRATORY SYNDROME CORONAVIRUS 2 (SARS-COV-2) (CORONAVIRUS DISEASE [COVID-19]), AMPLIFIED PROBE TECHNIQUE, MAKING USE OF HIGH THROUGHPUT TECHNOLOGIES AS DESCRIBED BY CMS-2020-01-R: HCPCS

## 2020-10-02 PROCEDURE — 81001 URINALYSIS AUTO W/SCOPE: CPT

## 2020-10-02 PROCEDURE — 85025 COMPLETE CBC W/AUTO DIFF WBC: CPT

## 2020-10-02 PROCEDURE — 80048 BASIC METABOLIC PNL TOTAL CA: CPT

## 2020-10-04 ENCOUNTER — TELEPHONE (OUTPATIENT)
Dept: UROLOGY | Age: 75
End: 2020-10-04

## 2020-10-04 LAB — SARS-COV-2: NOT DETECTED

## 2020-10-05 ENCOUNTER — PREP FOR PROCEDURE (OUTPATIENT)
Dept: UROLOGY | Age: 75
End: 2020-10-05

## 2020-10-05 RX ORDER — SODIUM CHLORIDE 9 MG/ML
INJECTION, SOLUTION INTRAVENOUS CONTINUOUS
Status: CANCELLED | OUTPATIENT
Start: 2020-10-08

## 2020-10-07 ENCOUNTER — ANESTHESIA EVENT (OUTPATIENT)
Dept: OPERATING ROOM | Age: 75
DRG: 656 | End: 2020-10-07
Payer: MEDICARE

## 2020-10-08 ENCOUNTER — HOSPITAL ENCOUNTER (INPATIENT)
Age: 75
LOS: 4 days | Discharge: HOME | DRG: 656 | End: 2020-10-12
Attending: UROLOGY | Admitting: UROLOGY
Payer: MEDICARE

## 2020-10-08 ENCOUNTER — ANESTHESIA (OUTPATIENT)
Dept: OPERATING ROOM | Age: 75
DRG: 656 | End: 2020-10-08
Payer: MEDICARE

## 2020-10-08 VITALS — SYSTOLIC BLOOD PRESSURE: 150 MMHG | TEMPERATURE: 93.4 F | OXYGEN SATURATION: 93 % | DIASTOLIC BLOOD PRESSURE: 79 MMHG

## 2020-10-08 PROBLEM — N28.89 LEFT RENAL MASS: Status: ACTIVE | Noted: 2020-10-08

## 2020-10-08 LAB
ABO: NORMAL
ANION GAP SERPL CALCULATED.3IONS-SCNC: 9 MEQ/L (ref 8–16)
ANTIBODY SCREEN: NORMAL
BASOPHILS # BLD: 0.2 %
BASOPHILS ABSOLUTE: 0 THOU/MM3 (ref 0–0.1)
BUN BLDV-MCNC: 24 MG/DL (ref 7–22)
CALCIUM SERPL-MCNC: 8.6 MG/DL (ref 8.5–10.5)
CHLORIDE BLD-SCNC: 108 MEQ/L (ref 98–111)
CO2: 21 MEQ/L (ref 23–33)
CREAT SERPL-MCNC: 1.5 MG/DL (ref 0.4–1.2)
EOSINOPHIL # BLD: 0 %
EOSINOPHILS ABSOLUTE: 0 THOU/MM3 (ref 0–0.4)
ERYTHROCYTE [DISTWIDTH] IN BLOOD BY AUTOMATED COUNT: 12.4 % (ref 11.5–14.5)
ERYTHROCYTE [DISTWIDTH] IN BLOOD BY AUTOMATED COUNT: 41.5 FL (ref 35–45)
GFR SERPL CREATININE-BSD FRML MDRD: 34 ML/MIN/1.73M2
GLUCOSE BLD-MCNC: 176 MG/DL (ref 70–108)
GLUCOSE BLD-MCNC: 219 MG/DL (ref 70–108)
GLUCOSE BLD-MCNC: 227 MG/DL (ref 70–108)
GLUCOSE BLD-MCNC: 243 MG/DL (ref 70–108)
GLUCOSE BLD-MCNC: 248 MG/DL (ref 70–108)
HCT VFR BLD CALC: 36.4 % (ref 37–47)
HEMOGLOBIN: 11.5 GM/DL (ref 12–16)
IMMATURE GRANS (ABS): 0.06 THOU/MM3 (ref 0–0.07)
IMMATURE GRANULOCYTES: 0.5 %
LYMPHOCYTES # BLD: 2.1 %
LYMPHOCYTES ABSOLUTE: 0.3 THOU/MM3 (ref 1–4.8)
MCH RBC QN AUTO: 29.1 PG (ref 26–33)
MCHC RBC AUTO-ENTMCNC: 31.6 GM/DL (ref 32.2–35.5)
MCV RBC AUTO: 92.2 FL (ref 81–99)
MONOCYTES # BLD: 1.8 %
MONOCYTES ABSOLUTE: 0.2 THOU/MM3 (ref 0.4–1.3)
NUCLEATED RED BLOOD CELLS: 0 /100 WBC
PLATELET # BLD: 201 THOU/MM3 (ref 130–400)
PMV BLD AUTO: 9.4 FL (ref 9.4–12.4)
POTASSIUM SERPL-SCNC: 4.3 MEQ/L (ref 3.5–5.2)
RBC # BLD: 3.95 MILL/MM3 (ref 4.2–5.4)
RH FACTOR: NORMAL
SEG NEUTROPHILS: 95.4 %
SEGMENTED NEUTROPHILS ABSOLUTE COUNT: 11.4 THOU/MM3 (ref 1.8–7.7)
SODIUM BLD-SCNC: 138 MEQ/L (ref 135–145)
WBC # BLD: 12 THOU/MM3 (ref 4.8–10.8)

## 2020-10-08 PROCEDURE — 3600000009 HC SURGERY ROBOT BASE: Performed by: UROLOGY

## 2020-10-08 PROCEDURE — 2580000003 HC RX 258: Performed by: PHYSICIAN ASSISTANT

## 2020-10-08 PROCEDURE — S2900 ROBOTIC SURGICAL SYSTEM: HCPCS | Performed by: UROLOGY

## 2020-10-08 PROCEDURE — 3600000019 HC SURGERY ROBOT ADDTL 15MIN: Performed by: UROLOGY

## 2020-10-08 PROCEDURE — 88350 IMFLUOR EA ADDL 1ANTB STN PX: CPT

## 2020-10-08 PROCEDURE — 80048 BASIC METABOLIC PNL TOTAL CA: CPT

## 2020-10-08 PROCEDURE — 50543 LAPARO PARTIAL NEPHRECTOMY: CPT | Performed by: PHYSICIAN ASSISTANT

## 2020-10-08 PROCEDURE — 6360000002 HC RX W HCPCS: Performed by: NURSE ANESTHETIST, CERTIFIED REGISTERED

## 2020-10-08 PROCEDURE — 7100000000 HC PACU RECOVERY - FIRST 15 MIN: Performed by: UROLOGY

## 2020-10-08 PROCEDURE — 2500000003 HC RX 250 WO HCPCS: Performed by: NURSE ANESTHETIST, CERTIFIED REGISTERED

## 2020-10-08 PROCEDURE — 3700000001 HC ADD 15 MINUTES (ANESTHESIA): Performed by: UROLOGY

## 2020-10-08 PROCEDURE — 2780000010 HC IMPLANT OTHER: Performed by: UROLOGY

## 2020-10-08 PROCEDURE — 1200000000 HC SEMI PRIVATE

## 2020-10-08 PROCEDURE — 7100000001 HC PACU RECOVERY - ADDTL 15 MIN: Performed by: UROLOGY

## 2020-10-08 PROCEDURE — 94760 N-INVAS EAR/PLS OXIMETRY 1: CPT

## 2020-10-08 PROCEDURE — 88313 SPECIAL STAINS GROUP 2: CPT

## 2020-10-08 PROCEDURE — 7100000011 HC PHASE II RECOVERY - ADDTL 15 MIN: Performed by: UROLOGY

## 2020-10-08 PROCEDURE — 36620 INSERTION CATHETER ARTERY: CPT | Performed by: ANESTHESIOLOGY

## 2020-10-08 PROCEDURE — 2500000003 HC RX 250 WO HCPCS: Performed by: UROLOGY

## 2020-10-08 PROCEDURE — 99222 1ST HOSP IP/OBS MODERATE 55: CPT | Performed by: INTERNAL MEDICINE

## 2020-10-08 PROCEDURE — 88307 TISSUE EXAM BY PATHOLOGIST: CPT

## 2020-10-08 PROCEDURE — 6370000000 HC RX 637 (ALT 250 FOR IP): Performed by: ANESTHESIOLOGY

## 2020-10-08 PROCEDURE — 6360000002 HC RX W HCPCS: Performed by: PHYSICIAN ASSISTANT

## 2020-10-08 PROCEDURE — 86901 BLOOD TYPING SEROLOGIC RH(D): CPT

## 2020-10-08 PROCEDURE — 86900 BLOOD TYPING SEROLOGIC ABO: CPT

## 2020-10-08 PROCEDURE — 2580000003 HC RX 258: Performed by: UROLOGY

## 2020-10-08 PROCEDURE — 82948 REAGENT STRIP/BLOOD GLUCOSE: CPT

## 2020-10-08 PROCEDURE — 36415 COLL VENOUS BLD VENIPUNCTURE: CPT

## 2020-10-08 PROCEDURE — 88305 TISSUE EXAM BY PATHOLOGIST: CPT

## 2020-10-08 PROCEDURE — 2720000010 HC SURG SUPPLY STERILE: Performed by: UROLOGY

## 2020-10-08 PROCEDURE — 88346 IMFLUOR 1ST 1ANTB STAIN PX: CPT

## 2020-10-08 PROCEDURE — 85025 COMPLETE CBC W/AUTO DIFF WBC: CPT

## 2020-10-08 PROCEDURE — 0TB14ZZ EXCISION OF LEFT KIDNEY, PERCUTANEOUS ENDOSCOPIC APPROACH: ICD-10-PCS | Performed by: UROLOGY

## 2020-10-08 PROCEDURE — 8E0W4CZ ROBOTIC ASSISTED PROCEDURE OF TRUNK REGION, PERCUTANEOUS ENDOSCOPIC APPROACH: ICD-10-PCS | Performed by: UROLOGY

## 2020-10-08 PROCEDURE — 88348 ELECTRON MICROSCOPY DX: CPT

## 2020-10-08 PROCEDURE — 86850 RBC ANTIBODY SCREEN: CPT

## 2020-10-08 PROCEDURE — 2700000000 HC OXYGEN THERAPY PER DAY

## 2020-10-08 PROCEDURE — 7100000010 HC PHASE II RECOVERY - FIRST 15 MIN: Performed by: UROLOGY

## 2020-10-08 PROCEDURE — 6360000002 HC RX W HCPCS: Performed by: UROLOGY

## 2020-10-08 PROCEDURE — 3700000000 HC ANESTHESIA ATTENDED CARE: Performed by: UROLOGY

## 2020-10-08 PROCEDURE — 6370000000 HC RX 637 (ALT 250 FOR IP): Performed by: PHYSICIAN ASSISTANT

## 2020-10-08 PROCEDURE — 2709999900 HC NON-CHARGEABLE SUPPLY: Performed by: UROLOGY

## 2020-10-08 DEVICE — AGENT HEMOSTATIC SURGIFLOW MATRIX KIT W/THROMBIN: Type: IMPLANTABLE DEVICE | Status: FUNCTIONAL

## 2020-10-08 RX ORDER — FENTANYL CITRATE 50 UG/ML
INJECTION, SOLUTION INTRAMUSCULAR; INTRAVENOUS PRN
Status: DISCONTINUED | OUTPATIENT
Start: 2020-10-08 | End: 2020-10-08 | Stop reason: SDUPTHER

## 2020-10-08 RX ORDER — LIDOCAINE HCL/PF 100 MG/5ML
SYRINGE (ML) INJECTION PRN
Status: DISCONTINUED | OUTPATIENT
Start: 2020-10-08 | End: 2020-10-08 | Stop reason: SDUPTHER

## 2020-10-08 RX ORDER — HYDROCODONE BITARTRATE AND ACETAMINOPHEN 5; 325 MG/1; MG/1
1 TABLET ORAL EVERY 6 HOURS PRN
Status: DISCONTINUED | OUTPATIENT
Start: 2020-10-08 | End: 2020-10-12 | Stop reason: HOSPADM

## 2020-10-08 RX ORDER — DEXTROSE MONOHYDRATE 25 G/50ML
12.5 INJECTION, SOLUTION INTRAVENOUS PRN
Status: DISCONTINUED | OUTPATIENT
Start: 2020-10-08 | End: 2020-10-12 | Stop reason: HOSPADM

## 2020-10-08 RX ORDER — PROPOFOL 10 MG/ML
INJECTION, EMULSION INTRAVENOUS CONTINUOUS PRN
Status: DISCONTINUED | OUTPATIENT
Start: 2020-10-08 | End: 2020-10-08 | Stop reason: SDUPTHER

## 2020-10-08 RX ORDER — MORPHINE SULFATE 2 MG/ML
4 INJECTION, SOLUTION INTRAMUSCULAR; INTRAVENOUS
Status: DISCONTINUED | OUTPATIENT
Start: 2020-10-08 | End: 2020-10-12 | Stop reason: HOSPADM

## 2020-10-08 RX ORDER — SODIUM CHLORIDE 0.9 % (FLUSH) 0.9 %
10 SYRINGE (ML) INJECTION EVERY 12 HOURS SCHEDULED
Status: DISCONTINUED | OUTPATIENT
Start: 2020-10-08 | End: 2020-10-12 | Stop reason: HOSPADM

## 2020-10-08 RX ORDER — SODIUM CHLORIDE 9 MG/ML
INJECTION, SOLUTION INTRAVENOUS CONTINUOUS
Status: DISCONTINUED | OUTPATIENT
Start: 2020-10-08 | End: 2020-10-08

## 2020-10-08 RX ORDER — PRAVASTATIN SODIUM 40 MG
40 TABLET ORAL NIGHTLY
Status: DISCONTINUED | OUTPATIENT
Start: 2020-10-08 | End: 2020-10-12 | Stop reason: HOSPADM

## 2020-10-08 RX ORDER — LISINOPRIL 20 MG/1
20 TABLET ORAL DAILY
Status: DISCONTINUED | OUTPATIENT
Start: 2020-10-08 | End: 2020-10-11

## 2020-10-08 RX ORDER — SENNA AND DOCUSATE SODIUM 50; 8.6 MG/1; MG/1
1 TABLET, FILM COATED ORAL 2 TIMES DAILY
Status: DISCONTINUED | OUTPATIENT
Start: 2020-10-08 | End: 2020-10-12 | Stop reason: HOSPADM

## 2020-10-08 RX ORDER — DEXAMETHASONE SODIUM PHOSPHATE 4 MG/ML
INJECTION, SOLUTION INTRA-ARTICULAR; INTRALESIONAL; INTRAMUSCULAR; INTRAVENOUS; SOFT TISSUE PRN
Status: DISCONTINUED | OUTPATIENT
Start: 2020-10-08 | End: 2020-10-08 | Stop reason: SDUPTHER

## 2020-10-08 RX ORDER — GLYCOPYRROLATE 1 MG/5 ML
SYRINGE (ML) INTRAVENOUS PRN
Status: DISCONTINUED | OUTPATIENT
Start: 2020-10-08 | End: 2020-10-08 | Stop reason: SDUPTHER

## 2020-10-08 RX ORDER — ACETAMINOPHEN 325 MG/1
650 TABLET ORAL EVERY 4 HOURS PRN
Status: DISCONTINUED | OUTPATIENT
Start: 2020-10-08 | End: 2020-10-12 | Stop reason: HOSPADM

## 2020-10-08 RX ORDER — OXYBUTYNIN CHLORIDE 10 MG/1
10 TABLET, EXTENDED RELEASE ORAL DAILY
Status: DISCONTINUED | OUTPATIENT
Start: 2020-10-08 | End: 2020-10-11

## 2020-10-08 RX ORDER — ONDANSETRON 4 MG/1
4 TABLET, ORALLY DISINTEGRATING ORAL EVERY 8 HOURS PRN
Status: DISCONTINUED | OUTPATIENT
Start: 2020-10-08 | End: 2020-10-12 | Stop reason: HOSPADM

## 2020-10-08 RX ORDER — SODIUM CHLORIDE 0.9 % (FLUSH) 0.9 %
10 SYRINGE (ML) INJECTION PRN
Status: DISCONTINUED | OUTPATIENT
Start: 2020-10-08 | End: 2020-10-12 | Stop reason: HOSPADM

## 2020-10-08 RX ORDER — ROCURONIUM BROMIDE 10 MG/ML
INJECTION, SOLUTION INTRAVENOUS PRN
Status: DISCONTINUED | OUTPATIENT
Start: 2020-10-08 | End: 2020-10-08 | Stop reason: SDUPTHER

## 2020-10-08 RX ORDER — KETAMINE HCL IN NACL, ISO-OSM 100MG/10ML
SYRINGE (ML) INJECTION PRN
Status: DISCONTINUED | OUTPATIENT
Start: 2020-10-08 | End: 2020-10-08 | Stop reason: SDUPTHER

## 2020-10-08 RX ORDER — NICOTINE POLACRILEX 4 MG
15 LOZENGE BUCCAL PRN
Status: DISCONTINUED | OUTPATIENT
Start: 2020-10-08 | End: 2020-10-12 | Stop reason: HOSPADM

## 2020-10-08 RX ORDER — BUPIVACAINE HYDROCHLORIDE 5 MG/ML
INJECTION, SOLUTION EPIDURAL; INTRACAUDAL PRN
Status: DISCONTINUED | OUTPATIENT
Start: 2020-10-08 | End: 2020-10-08 | Stop reason: HOSPADM

## 2020-10-08 RX ORDER — TIMOLOL MALEATE 5 MG/ML
1 SOLUTION/ DROPS OPHTHALMIC 2 TIMES DAILY
Status: DISCONTINUED | OUTPATIENT
Start: 2020-10-08 | End: 2020-10-12 | Stop reason: HOSPADM

## 2020-10-08 RX ORDER — AMLODIPINE BESYLATE 10 MG/1
10 TABLET ORAL DAILY
Status: DISCONTINUED | OUTPATIENT
Start: 2020-10-09 | End: 2020-10-12 | Stop reason: HOSPADM

## 2020-10-08 RX ORDER — MORPHINE SULFATE 2 MG/ML
2 INJECTION, SOLUTION INTRAMUSCULAR; INTRAVENOUS
Status: DISCONTINUED | OUTPATIENT
Start: 2020-10-08 | End: 2020-10-12 | Stop reason: HOSPADM

## 2020-10-08 RX ORDER — MIDAZOLAM HYDROCHLORIDE 1 MG/ML
INJECTION INTRAMUSCULAR; INTRAVENOUS PRN
Status: DISCONTINUED | OUTPATIENT
Start: 2020-10-08 | End: 2020-10-08 | Stop reason: SDUPTHER

## 2020-10-08 RX ORDER — PROPOFOL 10 MG/ML
INJECTION, EMULSION INTRAVENOUS PRN
Status: DISCONTINUED | OUTPATIENT
Start: 2020-10-08 | End: 2020-10-08 | Stop reason: SDUPTHER

## 2020-10-08 RX ORDER — MANNITOL 250 MG/ML
INJECTION, SOLUTION INTRAVENOUS PRN
Status: DISCONTINUED | OUTPATIENT
Start: 2020-10-08 | End: 2020-10-08 | Stop reason: SDUPTHER

## 2020-10-08 RX ORDER — DEXTROSE MONOHYDRATE 50 MG/ML
100 INJECTION, SOLUTION INTRAVENOUS PRN
Status: DISCONTINUED | OUTPATIENT
Start: 2020-10-08 | End: 2020-10-12 | Stop reason: HOSPADM

## 2020-10-08 RX ORDER — PHENYLEPHRINE HYDROCHLORIDE 10 MG/ML
INJECTION INTRAVENOUS PRN
Status: DISCONTINUED | OUTPATIENT
Start: 2020-10-08 | End: 2020-10-08 | Stop reason: SDUPTHER

## 2020-10-08 RX ORDER — ONDANSETRON 2 MG/ML
4 INJECTION INTRAMUSCULAR; INTRAVENOUS EVERY 6 HOURS PRN
Status: DISCONTINUED | OUTPATIENT
Start: 2020-10-08 | End: 2020-10-12 | Stop reason: HOSPADM

## 2020-10-08 RX ORDER — SODIUM CHLORIDE 9 MG/ML
INJECTION, SOLUTION INTRAVENOUS CONTINUOUS
Status: DISCONTINUED | OUTPATIENT
Start: 2020-10-08 | End: 2020-10-10

## 2020-10-08 RX ORDER — IBUPROFEN 200 MG
CAPSULE ORAL 2 TIMES DAILY
Status: DISCONTINUED | OUTPATIENT
Start: 2020-10-08 | End: 2020-10-12 | Stop reason: HOSPADM

## 2020-10-08 RX ADMIN — PROPOFOL 150 MG: 10 INJECTION, EMULSION INTRAVENOUS at 07:49

## 2020-10-08 RX ADMIN — SUGAMMADEX 200 MG: 100 INJECTION, SOLUTION INTRAVENOUS at 11:16

## 2020-10-08 RX ADMIN — ROCURONIUM BROMIDE 10 MG: 10 INJECTION INTRAVENOUS at 10:25

## 2020-10-08 RX ADMIN — PRAVASTATIN SODIUM 40 MG: 40 TABLET ORAL at 21:13

## 2020-10-08 RX ADMIN — SODIUM CHLORIDE: 9 INJECTION, SOLUTION INTRAVENOUS at 11:30

## 2020-10-08 RX ADMIN — LISINOPRIL 20 MG: 20 TABLET ORAL at 21:14

## 2020-10-08 RX ADMIN — MANNITOL 12.5 G: 12.5 INJECTION, SOLUTION INTRAVENOUS at 11:03

## 2020-10-08 RX ADMIN — Medication 10 ML: at 21:18

## 2020-10-08 RX ADMIN — Medication 50 MG: at 07:49

## 2020-10-08 RX ADMIN — PHENYLEPHRINE HYDROCHLORIDE 100 MCG: 10 INJECTION INTRAVENOUS at 08:02

## 2020-10-08 RX ADMIN — ROCURONIUM BROMIDE 50 MG: 10 INJECTION INTRAVENOUS at 07:49

## 2020-10-08 RX ADMIN — PROPOFOL 150 MCG/KG/MIN: 10 INJECTION, EMULSION INTRAVENOUS at 08:04

## 2020-10-08 RX ADMIN — CEFAZOLIN 2 G: 10 INJECTION, POWDER, FOR SOLUTION INTRAVENOUS at 08:04

## 2020-10-08 RX ADMIN — DOCUSATE SODIUM 50 MG AND SENNOSIDES 8.6 MG 1 TABLET: 8.6; 5 TABLET, FILM COATED ORAL at 21:13

## 2020-10-08 RX ADMIN — Medication 0.4 MG: at 08:28

## 2020-10-08 RX ADMIN — OXYBUTYNIN CHLORIDE 10 MG: 10 TABLET, EXTENDED RELEASE ORAL at 21:14

## 2020-10-08 RX ADMIN — SODIUM CHLORIDE: 9 INJECTION, SOLUTION INTRAVENOUS at 11:03

## 2020-10-08 RX ADMIN — Medication 4 UNITS: at 12:07

## 2020-10-08 RX ADMIN — DEXAMETHASONE SODIUM PHOSPHATE 8 MG: 4 INJECTION, SOLUTION INTRAMUSCULAR; INTRAVENOUS at 07:49

## 2020-10-08 RX ADMIN — SODIUM CHLORIDE: 9 INJECTION, SOLUTION INTRAVENOUS at 06:52

## 2020-10-08 RX ADMIN — MIDAZOLAM HYDROCHLORIDE 2 MG: 1 INJECTION, SOLUTION INTRAMUSCULAR; INTRAVENOUS at 07:49

## 2020-10-08 RX ADMIN — PHENYLEPHRINE HYDROCHLORIDE 100 MCG: 10 INJECTION INTRAVENOUS at 08:46

## 2020-10-08 RX ADMIN — CEFOXITIN SODIUM 2 G: 2 POWDER, FOR SOLUTION INTRAVENOUS at 21:24

## 2020-10-08 RX ADMIN — SODIUM CHLORIDE: 9 INJECTION, SOLUTION INTRAVENOUS at 16:27

## 2020-10-08 RX ADMIN — PHENYLEPHRINE HYDROCHLORIDE 100 MCG: 10 INJECTION INTRAVENOUS at 08:17

## 2020-10-08 RX ADMIN — Medication 100 MG: at 07:49

## 2020-10-08 RX ADMIN — SODIUM CHLORIDE: 9 INJECTION, SOLUTION INTRAVENOUS at 10:13

## 2020-10-08 RX ADMIN — FENTANYL CITRATE 100 MCG: 50 INJECTION, SOLUTION INTRAMUSCULAR; INTRAVENOUS at 07:49

## 2020-10-08 RX ADMIN — SODIUM CHLORIDE: 9 INJECTION, SOLUTION INTRAVENOUS at 15:01

## 2020-10-08 RX ADMIN — TIMOLOL MALEATE 1 DROP: 5 SOLUTION/ DROPS OPHTHALMIC at 21:38

## 2020-10-08 RX ADMIN — PHENYLEPHRINE HYDROCHLORIDE 100 MCG: 10 INJECTION INTRAVENOUS at 08:26

## 2020-10-08 RX ADMIN — PHENYLEPHRINE HYDROCHLORIDE 100 MCG: 10 INJECTION INTRAVENOUS at 09:00

## 2020-10-08 RX ADMIN — SODIUM CHLORIDE: 9 INJECTION, SOLUTION INTRAVENOUS at 08:43

## 2020-10-08 RX ADMIN — MANNITOL 12.5 G: 12.5 INJECTION, SOLUTION INTRAVENOUS at 10:17

## 2020-10-08 RX ADMIN — SODIUM CHLORIDE: 9 INJECTION, SOLUTION INTRAVENOUS at 07:49

## 2020-10-08 ASSESSMENT — PULMONARY FUNCTION TESTS
PIF_VALUE: 28
PIF_VALUE: 20
PIF_VALUE: 20
PIF_VALUE: 4
PIF_VALUE: 19
PIF_VALUE: 27
PIF_VALUE: 28
PIF_VALUE: 26
PIF_VALUE: 19
PIF_VALUE: 22
PIF_VALUE: 27
PIF_VALUE: 27
PIF_VALUE: 20
PIF_VALUE: 28
PIF_VALUE: 28
PIF_VALUE: 27
PIF_VALUE: 2
PIF_VALUE: 28
PIF_VALUE: 27
PIF_VALUE: 28
PIF_VALUE: 20
PIF_VALUE: 27
PIF_VALUE: 27
PIF_VALUE: 20
PIF_VALUE: 27
PIF_VALUE: 28
PIF_VALUE: 22
PIF_VALUE: 20
PIF_VALUE: 27
PIF_VALUE: 28
PIF_VALUE: 27
PIF_VALUE: 2
PIF_VALUE: 29
PIF_VALUE: 27
PIF_VALUE: 26
PIF_VALUE: 26
PIF_VALUE: 27
PIF_VALUE: 2
PIF_VALUE: 27
PIF_VALUE: 28
PIF_VALUE: 27
PIF_VALUE: 28
PIF_VALUE: 27
PIF_VALUE: 27
PIF_VALUE: 28
PIF_VALUE: 27
PIF_VALUE: 28
PIF_VALUE: 26
PIF_VALUE: 27
PIF_VALUE: 28
PIF_VALUE: 19
PIF_VALUE: 27
PIF_VALUE: 28
PIF_VALUE: 19
PIF_VALUE: 27
PIF_VALUE: 26
PIF_VALUE: 28
PIF_VALUE: 26
PIF_VALUE: 27
PIF_VALUE: 1
PIF_VALUE: 28
PIF_VALUE: 26
PIF_VALUE: 28
PIF_VALUE: 28
PIF_VALUE: 2
PIF_VALUE: 19
PIF_VALUE: 28
PIF_VALUE: 28
PIF_VALUE: 19
PIF_VALUE: 28
PIF_VALUE: 27
PIF_VALUE: 28
PIF_VALUE: 27
PIF_VALUE: 27
PIF_VALUE: 19
PIF_VALUE: 27
PIF_VALUE: 26
PIF_VALUE: 27
PIF_VALUE: 27
PIF_VALUE: 26
PIF_VALUE: 28
PIF_VALUE: 27
PIF_VALUE: 10
PIF_VALUE: 27
PIF_VALUE: 27
PIF_VALUE: 28
PIF_VALUE: 21
PIF_VALUE: 23
PIF_VALUE: 28
PIF_VALUE: 27
PIF_VALUE: 29
PIF_VALUE: 23
PIF_VALUE: 28
PIF_VALUE: 26
PIF_VALUE: 28
PIF_VALUE: 27
PIF_VALUE: 2
PIF_VALUE: 21
PIF_VALUE: 28
PIF_VALUE: 28
PIF_VALUE: 26
PIF_VALUE: 11
PIF_VALUE: 20
PIF_VALUE: 19
PIF_VALUE: 28
PIF_VALUE: 20
PIF_VALUE: 26
PIF_VALUE: 19
PIF_VALUE: 28
PIF_VALUE: 29
PIF_VALUE: 27
PIF_VALUE: 20
PIF_VALUE: 20
PIF_VALUE: 28
PIF_VALUE: 1
PIF_VALUE: 28
PIF_VALUE: 28
PIF_VALUE: 27
PIF_VALUE: 28
PIF_VALUE: 27
PIF_VALUE: 26
PIF_VALUE: 23
PIF_VALUE: 26
PIF_VALUE: 22
PIF_VALUE: 27
PIF_VALUE: 19
PIF_VALUE: 1
PIF_VALUE: 28
PIF_VALUE: 26
PIF_VALUE: 22
PIF_VALUE: 26
PIF_VALUE: 2
PIF_VALUE: 27
PIF_VALUE: 27
PIF_VALUE: 28
PIF_VALUE: 27
PIF_VALUE: 28
PIF_VALUE: 27
PIF_VALUE: 19
PIF_VALUE: 26
PIF_VALUE: 26
PIF_VALUE: 24
PIF_VALUE: 25
PIF_VALUE: 27
PIF_VALUE: 20
PIF_VALUE: 28
PIF_VALUE: 10
PIF_VALUE: 20
PIF_VALUE: 27
PIF_VALUE: 27
PIF_VALUE: 28
PIF_VALUE: 27
PIF_VALUE: 28
PIF_VALUE: 27
PIF_VALUE: 20
PIF_VALUE: 27
PIF_VALUE: 28
PIF_VALUE: 26
PIF_VALUE: 26
PIF_VALUE: 23
PIF_VALUE: 21
PIF_VALUE: 20
PIF_VALUE: 2
PIF_VALUE: 2
PIF_VALUE: 27
PIF_VALUE: 24
PIF_VALUE: 20
PIF_VALUE: 16
PIF_VALUE: 19
PIF_VALUE: 28
PIF_VALUE: 27
PIF_VALUE: 2
PIF_VALUE: 28
PIF_VALUE: 10
PIF_VALUE: 19
PIF_VALUE: 27
PIF_VALUE: 27
PIF_VALUE: 2
PIF_VALUE: 28
PIF_VALUE: 20
PIF_VALUE: 18
PIF_VALUE: 26
PIF_VALUE: 28
PIF_VALUE: 29
PIF_VALUE: 27
PIF_VALUE: 28
PIF_VALUE: 27
PIF_VALUE: 20
PIF_VALUE: 20
PIF_VALUE: 5
PIF_VALUE: 27
PIF_VALUE: 25
PIF_VALUE: 27
PIF_VALUE: 28
PIF_VALUE: 28
PIF_VALUE: 19
PIF_VALUE: 28
PIF_VALUE: 28
PIF_VALUE: 27
PIF_VALUE: 28
PIF_VALUE: 28
PIF_VALUE: 27
PIF_VALUE: 20
PIF_VALUE: 28
PIF_VALUE: 27
PIF_VALUE: 20
PIF_VALUE: 25
PIF_VALUE: 20
PIF_VALUE: 28
PIF_VALUE: 27
PIF_VALUE: 28
PIF_VALUE: 1
PIF_VALUE: 28
PIF_VALUE: 27
PIF_VALUE: 1
PIF_VALUE: 26
PIF_VALUE: 20
PIF_VALUE: 27

## 2020-10-08 ASSESSMENT — PAIN SCALES - GENERAL
PAINLEVEL_OUTOF10: 0
PAINLEVEL_OUTOF10: 1
PAINLEVEL_OUTOF10: 0

## 2020-10-08 ASSESSMENT — PAIN SCALES - WONG BAKER: WONGBAKER_NUMERICALRESPONSE: 0

## 2020-10-08 ASSESSMENT — PAIN DESCRIPTION - PAIN TYPE
TYPE: SURGICAL PAIN
TYPE: SURGICAL PAIN

## 2020-10-08 ASSESSMENT — PAIN DESCRIPTION - LOCATION
LOCATION: ABDOMEN
LOCATION: FLANK

## 2020-10-08 NOTE — ANESTHESIA PROCEDURE NOTES
Arterial Line:    An arterial line was placed using surface landmarks, in the OR for the following indication(s): continuous blood pressure monitoring and blood sampling needed. A 20 gauge (size), (length), Arrow (type) catheter was placed, Seldinger technique used, into the left radial artery, secured by tape and Tegaderm. Anesthesia type: General    Events:  patient tolerated procedure well with no complications.   Anesthesiologist: Angela Valdez MD  Performed: Anesthesiologist   Preanesthetic Checklist  Completed: patient identified, site marked, surgical consent, pre-op evaluation, timeout performed, IV checked, risks and benefits discussed, monitors and equipment checked, anesthesia consent given, oxygen available and patient being monitored

## 2020-10-08 NOTE — ANESTHESIA POSTPROCEDURE EVALUATION
Department of Anesthesiology  Postprocedure Note    Patient: Keenan Schneider  MRN: [id number]  YOB: 1945  Date of evaluation: 10/8/2020  Time:  12:41 PM     Procedure Summary     Date:  10/08/20 Room / Location:  [address] [place] [address] AMERCIA Rodriguez    Anesthesia Start:  9934 Anesthesia Stop:  5208    Procedure:  ROBOTIC LEFT PARTIAL NEPHRECTOMY (Left Abdomen) Diagnosis:  (LEFT RENAL MASS)    Surgeon:  Venancio Coburn MD Responsible Provider:  Margarito Givens MD    Anesthesia Type:  general ASA Status:  3          Anesthesia Type: general    Una Phase I: Una Score: 8    Una Phase II:      Last vitals: Reviewed and per EMR flowsheets.        Anesthesia Post Evaluation    Patient location during evaluation: PACU  Patient participation: complete - patient participated  Level of consciousness: awake  Airway patency: patent  Nausea & Vomiting: no nausea and no vomiting  Complications: no  Cardiovascular status: hemodynamically stable  Respiratory status: acceptable  Hydration status: stable

## 2020-10-08 NOTE — PROGRESS NOTES
Patient admitted to 5K-05 from PACU,  Daughter Krystina at bedside. Oriented to call light, room, and staff. Bed functions explained and demonstrated for patient. Patient requesting to have 2 siderails at the head of bed in the upright position to facilitate use of bed functions. Admission packet and patient rights provided, questions answered. Explained patients right to have family, representative or physician notified of their admission. Patient has Declined for physician to be notified. Patient has Declined for family/representative to be notified. No needs expressed at this time. nurse skin assessment performed    Admitting medication orders compared with acute stay medications; home medication list reviewed with patient/family.   Medication issues identified No

## 2020-10-08 NOTE — PROGRESS NOTES
Patient to OR with no dental appliances, jewelry, clothing, hearing aids, glasses or other belongings.   Glasses handed off to family in SDS>

## 2020-10-08 NOTE — PROGRESS NOTES
Patient is status post RAL Left Partial Nephrectomy with Left Renal biopsy on 10/8/20. Will plan to start Heparin 5000 units subcutaneous injections every 8 hours tomorrow morning if H/H stable.

## 2020-10-08 NOTE — PROGRESS NOTES
Pt states she feels \"a little achy\", but otherwise ok. Daughter remains at bedside. Pt drink cold water.

## 2020-10-08 NOTE — ANESTHESIA PRE PROCEDURE
Department of Anesthesiology  Preprocedure Note       Name:  Paris Clark   Age:  67 y.o.  :  1945                                          MRN:  [id number]         Date:  10/8/2020      Surgeon: Coretta Monteiro):  Kathy Herrera MD    Procedure: Procedure(s):  ROBOTIC LEFT PARTIAL NEPHRECTOMY, POSSIBLE RADICAL, POSS OPEN    Medications prior to admission:   Prior to Admission medications    Medication Sig Start Date End Date Taking? Authorizing Provider   SITagliptin (JANUVIA) 100 MG tablet Take 1 tablet by mouth daily 20  Yes KEHINDE Wells - CNP   pravastatin (PRAVACHOL) 40 MG tablet Take 1 tablet by mouth nightly 20  Yes KEHINDE Wells - CNP   lisinopril (PRINIVIL;ZESTRIL) 20 MG tablet Take 1 tablet by mouth daily  Patient taking differently: Take 20 mg by mouth nightly  20  Yes KEHINDE Wells - CNP   glipiZIDE (GLUCOTROL) 10 MG tablet Take 1 tablet by mouth 2 times daily 20  Yes Lenin Quiros DO   vitamin D (ERGOCALCIFEROL) 1.25 MG (55215 UT) CAPS capsule Take 1 capsule by mouth once a week 20  Yes Dread Hinds MD   calcium citrate-vitamin D (CITRICAL + D) 315-250 MG-UNIT TABS per tablet Take 1 tablet by mouth daily (with breakfast)   Yes Historical Provider, MD   vitamin C (ASCORBIC ACID) 500 MG tablet Take 500 mg by mouth as needed   Yes Historical Provider, MD   timolol (TIMOPTIC) 0.5 % ophthalmic solution 1 drop 2 times daily   Yes Historical Provider, MD   amLODIPine (NORVASC) 10 MG tablet Take 10 mg by mouth daily   Yes Historical Provider, MD   aspirin 325 MG tablet Take 325 mg by mouth daily. Yes Historical Provider, MD   Multiple Vitamins-Minerals (CENTRUM SILVER) TABS Take  by mouth daily.      Yes Historical Provider, MD       Current medications:    Current Facility-Administered Medications   Medication Dose Route Frequency Provider Last Rate Last Dose    0.9 % sodium chloride infusion   Intravenous Continuous Kathy Herrera  mL/hr at 10/08/20 0652      ceFAZolin (ANCEF) 2 g in dextrose 5 % 50 mL IVPB  2 g Intravenous MD Pooja           Allergies: Allergies   Allergen Reactions    Relafen [Nabumetone] Nausea Only    Sulfa Antibiotics Nausea Only       Problem List:    Patient Active Problem List   Diagnosis Code    Hyperlipidemia E78.5    Benign essential HTN I10       Past Medical History:        Diagnosis Date    Cataracts, bilateral     Diabetes mellitus ([address]     Glaucoma     History of blood transfusion 06/1961    age 15 , s/p leg fracture surgery [facility]    Hyperlipidemia     Hypertension     Nausea & vomiting     PONV (postoperative nausea and vomiting)        Past Surgical History:        Procedure Laterality Date    ABDOMINAL EXPLORATION SURGERY  1996    Adhesions     CHOLECYSTECTOMY  80    CORNEAL TRANSPLANT      HERNIA REPAIR  90    HYSTERECTOMY  89    INCISIONAL HERNIA REPAIR  10/28/2014    laparoscopic incisional herina repair w mesh--Dr. Johnny Strange    LEG SURGERY Right 1962    FX W/PLATES & REMOVED    TONSILLECTOMY  as a child       Social History:    Social History     Tobacco Use    Smoking status: Never Smoker    Smokeless tobacco: Never Used   Substance Use Topics    Alcohol use:  No                                Counseling given: Not Answered      Vital Signs (Current):   Vitals:    10/08/20 0643   BP: (!) 187/82   Pulse: 74   Resp: 16   Temp: 97 °F (36.1 °C)   TempSrc: Temporal   SpO2: 97%   Weight: 164 lb 3.2 oz (74.5 kg)   Height: 5' 1\" (1.549 m)                                              BP Readings from Last 3 Encounters:   10/08/20 (!) 187/82   10/01/20 (!) 177/76   08/26/20 130/68       NPO Status: Time of last liquid consumption: 2300                        Time of last solid consumption: 2300                        Date of last liquid consumption: 10/07/20                        Date of last solid food consumption: 10/07/20    BMI:   Wt Readings from Last 3 Encounters:   10/08/20 164 lb 3.2 oz (74.5 kg)   10/01/20 165 lb (74.8 kg)   09/11/20 160 lb (72.6 kg)     Body mass index is 31.03 kg/m². CBC:   Lab Results   Component Value Date    WBC 6.3 10/02/2020    RBC 4.03 10/02/2020    HGB 11.9 10/02/2020    HCT 37.1 10/02/2020    MCV 92.1 10/02/2020     10/02/2020       CMP:   Lab Results   Component Value Date     10/02/2020    K 4.6 10/02/2020     10/02/2020    CO2 26 10/02/2020    BUN 30 10/02/2020    CREATININE 1.4 10/02/2020    LABGLOM 37 10/02/2020    GLUCOSE 227 10/02/2020    PROT 7.4 07/09/2020    PROT 6.3 01/17/2017    CALCIUM 10.7 10/02/2020    BILITOT 0.2 07/09/2020    ALKPHOS 68 07/09/2020    AST 23 07/09/2020    ALT 15 07/09/2020       POC Tests: No results for input(s): POCGLU, POCNA, POCK, POCCL, POCBUN, POCHEMO, POCHCT in the last 72 hours. Coags: No results found for: PROTIME, INR, APTT    HCG (If Applicable): No results found for: PREGTESTUR, PREGSERUM, HCG, HCGQUANT     ABGs: No results found for: PHART, PO2ART, LBE1ADL, JMY2LZO, BEART, D4CJLKJX     Type & Screen (If Applicable):  No results found for: LABABO, LABRH    Drug/Infectious Status (If Applicable):  Lab Results   Component Value Date    HEPCAB Negative 07/09/2020       COVID-19 Screening (If Applicable):   Lab Results   Component Value Date    COVID19 Not Detected 10/02/2020         Anesthesia Evaluation     history of anesthetic complications: PONV. Airway: Mallampati: II  TM distance: >3 FB   Neck ROM: full  Mouth opening: > = 3 FB Dental:          Pulmonary:normal exam                               Cardiovascular:  Exercise tolerance: good (>4 METS),   (+) hypertension:, hyperlipidemia                  Neuro/Psych:                ROS comment: glaucoma GI/Hepatic/Renal:             Endo/Other:    (+) Diabetes, .           Pt had no PAT visit       Abdominal:           Vascular:                                        Anesthesia Plan      general     ASA 3

## 2020-10-08 NOTE — ANESTHESIA PROCEDURE NOTES
Arterial Line:    An arterial line was placed using surface landmarks, in the procedure area for the following indication(s): continuous blood pressure monitoring and blood sampling needed. A 20 gauge (size), 1 and 3/4 inch (length), Arrow (type) catheter was placed, Seldinger technique not used, into the left radial artery, secured by tape and Tegaderm. Anesthesia type: General    Events:  patient tolerated procedure well with no complications.   10/8/2020 7:50 AM10/8/2020 7:55 AM  Anesthesiologist: Tad Reis MD  Resident/CRNA: KEHINDE Damon CRNA  Performed: Anesthesiologist   Preanesthetic Checklist  Completed: patient identified, site marked, surgical consent, pre-op evaluation, timeout performed, IV checked, risks and benefits discussed, monitors and equipment checked, anesthesia consent given, oxygen available and patient being monitored

## 2020-10-08 NOTE — CONSULTS
Nephrology Consult Note  Patient's Name: Paris Clark  5:46 PM  10/8/2020    Nephrologist: Marine To    Reason for Consult: Elevated serum creatinine level  Requesting Physician:  Kathy Herrera MD  PCP: Lenin Quiros DO    Chief Complaint: None  Assessment  1. Stable stage IIIb chronic kidney disease  2. Left renal mass status post robotic assisted partial left nephrectomy postoperative day #0  3. Hypertension primary  4. Metabolic acidosis mild  5. Leukocytosis  6. Normocytic anemia  7. 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 I discussed my thoughts with the patient and family  2. They understood  3. They had no questions  4. Labs reviewed  5. Medications reviewed  6. No changes  7. Avoid  nonsteroidal anti-inflammatory medication for postoperative pain management  8. Labs in the morning  9. Dr. Rosalee Mobley will resume care tomorrow  10. Renal will follow      History Obtained From: Patient, staff and medical record  History of Present Ilness:    Paris Clark is a 67 y.o. female with history of diabetes mellitus, chronic kidney disease stage IIIb, hypertension, bilateral cataract, hernia with previous hernia surgery among other multiple medical problems admitted for elective robotic assisted partial left nephrectomy due to left renal mass. According to the patient, the left renal mass was discovered around June 2020. It has been monitored very closely and recently it was   decided to proceed with a partial robotic assisted left nephrectomy which was done today successfully by Dr. Beronica Peng. She has been followed by my partner Dr. Rosalee Mobley  for chronic kidney disease. Baseline serum creatinine has been between 1.4 to 1.9 mg/dL. Today it is 1.5 mg/dL. Asked to see for chronic kidney disease. She complains of being sore at the site of the surgery. Otherwise no chest pain or shortness of breath. No fever or chills. No nausea vomiting. No headaches or blurry vision.     Past Medical History: Diagnosis Date    Cataracts, bilateral     Diabetes mellitus ([facility] [address]     Glaucoma     History of blood transfusion 06/1961    age 15 , s/p leg fracture surgery [facility]    Hyperlipidemia     Hypertension     Nausea & vomiting     PONV (postoperative nausea and vomiting)        Past Surgical History:   Procedure Laterality Date    ABDOMINAL EXPLORATION SURGERY  1996    Adhesions     CHOLECYSTECTOMY  80    CORNEAL TRANSPLANT      HERNIA REPAIR  90    HYSTERECTOMY  89    INCISIONAL HERNIA REPAIR  10/28/2014    laparoscopic incisional herina repair w mesh--Dr. Johnathon Barajas LEG SURGERY Right 1962    FX W/PLATES & REMOVED    TONSILLECTOMY  as a child       Family History   Problem Relation Age of Onset    Cancer Mother     Heart Disease Father     Diabetes Sister     Diabetes Brother         reports that she has never smoked. She has never used smokeless tobacco. She reports that she does not drink alcohol or use drugs.     Allergies:  Relafen [nabumetone] and Sulfa antibiotics    Current Medications:    [START ON 10/9/2020] amLODIPine (NORVASC) tablet 10 mg, Daily  lisinopril (PRINIVIL;ZESTRIL) tablet 20 mg, Daily  pravastatin (PRAVACHOL) tablet 40 mg, Nightly  timolol (TIMOPTIC) 0.5 % ophthalmic solution 1 drop, BID  sodium chloride flush 0.9 % injection 10 mL, 2 times per day  sodium chloride flush 0.9 % injection 10 mL, PRN  neomycin-bacitracin-polymyxin (NEOSPORIN) ointment, BID  insulin lispro (HUMALOG) injection vial 0-12 Units, TID WC  insulin lispro (HUMALOG) injection vial 0-6 Units, Nightly  0.9 % sodium chloride infusion, Continuous  cefOXitin (MEFOXIN) 2 g in dextrose 5% 50 mL (mini-bag), Q8H  acetaminophen (TYLENOL) tablet 650 mg, Q4H PRN  HYDROcodone-acetaminophen (NORCO) 5-325 MG per tablet 1 tablet, Q6H PRN  morphine (PF) injection 2 mg, Q2H PRN    Or  morphine (PF) injection 4 mg, Q2H PRN  sennosides-docusate sodium (SENOKOT-S) 8.6-50 MG tablet 1 tablet, BID  magnesium hydroxide (MILK OF MAGNESIA) 400 MG/5ML suspension 30 mL, Daily PRN  bisacodyl (DULCOLAX) EC tablet 5 mg, Daily PRN  ondansetron (ZOFRAN-ODT) disintegrating tablet 4 mg, Q8H PRN    Or  ondansetron (ZOFRAN) injection 4 mg, Q6H PRN  oxybutynin (DITROPAN-XL) extended release tablet 10 mg, Daily  glucose (GLUTOSE) 40 % oral gel 15 g, PRN  dextrose 50 % IV solution, PRN  glucagon (rDNA) injection 1 mg, PRN  dextrose 5 % solution, PRN        Review of Systems:   Pertinent positives stated above in HPI. All other systems were reviewed and were negative. ROS:Constitutional: negative  Eyes: negative  Ears, nose, mouth, throat, and face: negative  Respiratory: negative  Cardiovascular: negative  Gastrointestinal: negative  Genitourinary:negative  Integument/breast: negative  Hematologic/lymphatic: negative  Musculoskeletal:negative  Neurological: negative  Behavioral/Psych: negative  Endocrine: negative  Allergic/Immunologic: negative    Physical exam:   Constitutional: Well-developed elderly lady no apparent distress. Vitals:   Vitals:    10/08/20 1720   BP:    Pulse:    Resp:    Temp:    SpO2: 95%       Skin: no rash, turgor is normal  Heent: Pupils are reactive to light. Throat is clear. Oral mucosa is moist.  Neck: no bruits or jvd noted   Cardiovascular:  S1, S2 without murmur   Respiratory: Clear with no wheezes,rhonchi or rales   Abdomen: Soft. Good bowel sounds. Area of surgery is dressed with surgical gauze. Ext: No lower extremity edema  Psychiatric: mood and affect appropriate  Musculoskeletal:  Rom, muscular strength intact   CNS: Very awake and very alert. Well-oriented. Normal speech. Good motor strength. No obvious focal deficit.     Data:   Labs:  Lab Results   Component Value Date     10/08/2020     10/02/2020     09/29/2020    K 4.3 10/08/2020    K 4.6 10/02/2020    K 5.1 09/29/2020     10/08/2020    CO2 21 (L) 10/08/2020    CO2 26 10/02/2020    CO2 27 09/29/2020    CREATININE 1.5 (H) 10/08/2020    CREATININE 1.4 (H) 10/02/2020    CREATININE 1.7 (H) 09/29/2020    BUN 24 (H) 10/08/2020    BUN 30 (H) 10/02/2020    BUN 26 (H) 09/29/2020    GLUCOSE 248 (H) 10/08/2020    GLUCOSE 227 (H) 10/02/2020    GLUCOSE 255 (H) 09/29/2020    WBC 12.0 (H) 10/08/2020    WBC 6.3 10/02/2020    WBC 6.9 09/29/2020    HGB 11.5 (L) 10/08/2020    HGB 11.9 (L) 10/02/2020    HGB 11.6 (L) 09/29/2020    HCT 36.4 (L) 10/08/2020    HCT 37.1 10/02/2020    HCT 36.1 (L) 09/29/2020    MCV 92.2 10/08/2020     10/08/2020     {Labs reviewed    Imaging:  CXR results:  @ studies        Thank you Dr. Ricky Boyd DO for allowing us to participate in care of Ericka Trujillo       Electronically signed by Ava Jones MD on 10/8/2020 at 5:46 PM

## 2020-10-08 NOTE — H&P
History and Physical    Patient:  Christiane Kaplan  MRN: [id number]  YOB: 1945    CHIEF COMPLAINT: Left renal mass    HISTORY OF PRESENT ILLNESS:   The patient is a 67 y.o. female who presents with left renal mass    Patient's old records, notes and chart reviewed and summarized above. Past Medical History:    Past Medical History:   Diagnosis Date    Cataracts, bilateral     Diabetes mellitus ([address]     Glaucoma     History of blood transfusion 06/1961    age 15 , s/p leg fracture surgery [facility]    Hyperlipidemia     Hypertension     Nausea & vomiting     PONV (postoperative nausea and vomiting)        Past Surgical History:    Past Surgical History:   Procedure Laterality Date    ABDOMINAL EXPLORATION SURGERY  1996    Adhesions     CHOLECYSTECTOMY  80    CORNEAL TRANSPLANT      HERNIA REPAIR  90    HYSTERECTOMY  89    INCISIONAL HERNIA REPAIR  10/28/2014    laparoscopic incisional herina repair w mesh--Dr. George Lesser    LEG SURGERY Right 1962    FX W/PLATES & REMOVED    TONSILLECTOMY  as a child     Medications Prior to Admission:    Prior to Admission medications    Medication Sig Start Date End Date Taking?  Authorizing Provider   SITagliptin (JANUVIA) 100 MG tablet Take 1 tablet by mouth daily 9/29/20  Yes KEHINDE Tsang CNP   pravastatin (PRAVACHOL) 40 MG tablet Take 1 tablet by mouth nightly 9/29/20  Yes KEHINDE Tsang CNP   lisinopril (PRINIVIL;ZESTRIL) 20 MG tablet Take 1 tablet by mouth daily  Patient taking differently: Take 20 mg by mouth nightly  9/29/20  Yes KEHINDE Tsang CNP   glipiZIDE (GLUCOTROL) 10 MG tablet Take 1 tablet by mouth 2 times daily 8/26/20  Yes Mirta Lehman DO   vitamin D (ERGOCALCIFEROL) 1.25 MG (53080 UT) CAPS capsule Take 1 capsule by mouth once a week 8/20/20  Yes Krystin Olivera MD   calcium citrate-vitamin D (CITRICAL + D) 315-250 MG-UNIT TABS per tablet Take 1 tablet by mouth daily (with breakfast)   Yes Historical Provider, MD   vitamin C (ASCORBIC ACID) 500 MG tablet Take 500 mg by mouth as needed   Yes Historical Provider, MD   timolol (TIMOPTIC) 0.5 % ophthalmic solution 1 drop 2 times daily   Yes Historical Provider, MD   amLODIPine (NORVASC) 10 MG tablet Take 10 mg by mouth daily   Yes Historical Provider, MD   aspirin 325 MG tablet Take 325 mg by mouth daily. Yes Historical Provider, MD   Multiple Vitamins-Minerals (CENTRUM SILVER) TABS Take  by mouth daily. Yes Historical Provider, MD       Allergies:  Relafen [nabumetone] and Sulfa antibiotics    Social History:    Social History     Socioeconomic History    Marital status:       Spouse name: Not on file    Number of children: Not on file    Years of education: Not on file    Highest education level: Not on file   Occupational History     Comment: retired   Social Needs    Financial resource strain: Not on file    Food insecurity     Worry: Not on file     Inability: Not on file   [employer] needs     Medical: Not on file     Non-medical: Not on file   Tobacco Use    Smoking status: Never Smoker    Smokeless tobacco: Never Used   Substance and Sexual Activity    Alcohol use: No    Drug use: No    Sexual activity: Not on file   Lifestyle    Physical activity     Days per week: Not on file     Minutes per session: Not on file    Stress: Not on file   Relationships    Social connections     Talks on phone: Not on file     Gets together: Not on file     Attends Protestant service: Not on file     Active member of club or organization: Not on file     Attends meetings of clubs or organizations: Not on file     Relationship status: Not on file    Intimate partner violence     Fear of current or ex partner: Not on file     Emotionally abused: Not on file     Physically abused: Not on file     Forced sexual activity: Not on file   Other Topics Concern    Not on file   Social History Narrative    Not on file       Family History: Family History   Problem Relation Age of Onset   Wagner Cancer Mother     Heart Disease Father     Diabetes Sister     Diabetes Brother        REVIEW OF SYSTEMS:  Constitutional: negative  Eyes: negative  Respiratory: negative  Cardiovascular: negative  Gastrointestinal: negative  Genitourinary: see HPI  Musculoskeletal: negative  Skin: negative   Neurological: negative  Hematological/Lymphatic: negative  Psychological: negative    Physical Exam:      Patient Vitals for the past 24 hrs:   BP Temp Temp src Pulse Resp SpO2 Height Weight   10/08/20 0643 (!) 187/82 97 °F (36.1 °C) Temporal 74 16 97 % 5' 1\" (1.549 m) 164 lb 3.2 oz (74.5 kg)     Constitutional: Patient in no acute distress; Neuro: alert and oriented to person place and time. Psych: Mood and affect normal.  Skin: Normal  Lungs: Respiratory effort normal, CTA  Cardiovascular:  Normal peripheral pulses; no murmur  Abdomen: Soft, non-tender, non-distended with no CVA, flank pain, hepatosplenomegaly or hernia. Kidneys normal.  Bladder non-tender and not distended. LABS:   No results for input(s): WBC, HGB, HCT, MCV, PLT in the last 72 hours. No results for input(s): NA, K, CL, CO2, PHOS, BUN, CREATININE in the last 72 hours. Invalid input(s): CA  No results found for: PSA        Urinalysis: No results for input(s): COLORU, PHUR, LABCAST, WBCUA, RBCUA, MUCUS, TRICHOMONAS, YEAST, BACTERIA, CLARITYU, SPECGRAV, LEUKOCYTESUR, UROBILINOGEN, [place] in the last 72 hours.     Invalid input(s): NITRATE, GLUCOSEUKETONESUAMORPHOUS     -----------------------------------------------------------------      Assessment and Plan   Impression:    Patient Active Problem List   Diagnosis    Hyperlipidemia    Benign essential HTN    Left renal mass       Plan:     Consent obtained; robotic left partial nephrectomy possible radical in OR today    Vaibhav Alvarez M.D  8:21 AM 10/8/2020

## 2020-10-08 NOTE — BRIEF OP NOTE
Brief Postoperative Note      Patient: Gilford Starring  YOB: 1945  MRN: [id number]    Date of Procedure: 10/8/2020    Pre-Op Diagnosis: LEFT RENAL MASS    Post-Op Diagnosis: Same       Surgery: Robotic Assisted Laparoscopic Left Partial Nephrectomy, Left Renal Biopsy    Surgeon(s):  Michelle Jorge MD    Assistant:  Surgical Assistant: Neida Chavez  Physician Assistant: Leata Hashimoto, PA-C    Anesthesia: General    Estimated Blood Loss (mL): 744 mL    Complications: None    Specimens:   ID Type Source Tests Collected by Time Destination   A : left renal cortex biopsy Tissue Kidney SURGICAL PATHOLOGY Michelle Jorge MD 10/8/2020 3993    B : Left renal mass Tissue Kidney SURGICAL PATHOLOGY Michelle Jorge MD 10/8/2020 2351        Implants:  Implant Name Type Inv.  Item Serial No.  Lot No. LRB No. Used Action   KIT SEALANT SURGIFLO HEMOSTATIC MATRIX Bone/Graft/Tissue/Human/Synth KIT SEALANT SURGIFLO HEMOSTATIC MATRIX  JNJ: Dio Hagerworth [id number] Left 1 Implanted         Drains:   Urethral Catheter Non-latex;Straight-tip 16 fr (Active)       Findings: See dictated operative note    Electronically signed by Leata Hashimoto, PA-C on 10/8/2020 at 11:41 AM

## 2020-10-09 LAB
ANION GAP SERPL CALCULATED.3IONS-SCNC: 11 MEQ/L (ref 8–16)
ANION GAP SERPL CALCULATED.3IONS-SCNC: 8 MEQ/L (ref 8–16)
BASOPHILS # BLD: 0.3 %
BASOPHILS # BLD: 0.4 %
BASOPHILS ABSOLUTE: 0 THOU/MM3 (ref 0–0.1)
BASOPHILS ABSOLUTE: 0.1 THOU/MM3 (ref 0–0.1)
BUN BLDV-MCNC: 22 MG/DL (ref 7–22)
BUN BLDV-MCNC: 24 MG/DL (ref 7–22)
CALCIUM SERPL-MCNC: 8.1 MG/DL (ref 8.5–10.5)
CALCIUM SERPL-MCNC: 8.3 MG/DL (ref 8.5–10.5)
CHLORIDE BLD-SCNC: 103 MEQ/L (ref 98–111)
CHLORIDE BLD-SCNC: 108 MEQ/L (ref 98–111)
CO2: 21 MEQ/L (ref 23–33)
CO2: 21 MEQ/L (ref 23–33)
CREAT SERPL-MCNC: 1.9 MG/DL (ref 0.4–1.2)
CREAT SERPL-MCNC: 2 MG/DL (ref 0.4–1.2)
EOSINOPHIL # BLD: 0 %
EOSINOPHIL # BLD: 0.1 %
EOSINOPHILS ABSOLUTE: 0 THOU/MM3 (ref 0–0.4)
EOSINOPHILS ABSOLUTE: 0 THOU/MM3 (ref 0–0.4)
ERYTHROCYTE [DISTWIDTH] IN BLOOD BY AUTOMATED COUNT: 12.8 % (ref 11.5–14.5)
ERYTHROCYTE [DISTWIDTH] IN BLOOD BY AUTOMATED COUNT: 12.8 % (ref 11.5–14.5)
ERYTHROCYTE [DISTWIDTH] IN BLOOD BY AUTOMATED COUNT: 43.8 FL (ref 35–45)
ERYTHROCYTE [DISTWIDTH] IN BLOOD BY AUTOMATED COUNT: 43.8 FL (ref 35–45)
GFR SERPL CREATININE-BSD FRML MDRD: 24 ML/MIN/1.73M2
GFR SERPL CREATININE-BSD FRML MDRD: 26 ML/MIN/1.73M2
GLUCOSE BLD-MCNC: 159 MG/DL (ref 70–108)
GLUCOSE BLD-MCNC: 166 MG/DL (ref 70–108)
GLUCOSE BLD-MCNC: 181 MG/DL (ref 70–108)
GLUCOSE BLD-MCNC: 208 MG/DL (ref 70–108)
GLUCOSE BLD-MCNC: 211 MG/DL (ref 70–108)
GLUCOSE BLD-MCNC: 217 MG/DL (ref 70–108)
HCT VFR BLD CALC: 29.4 % (ref 37–47)
HCT VFR BLD CALC: 30.7 % (ref 37–47)
HEMOGLOBIN: 9.3 GM/DL (ref 12–16)
HEMOGLOBIN: 9.7 GM/DL (ref 12–16)
IMMATURE GRANS (ABS): 0.03 THOU/MM3 (ref 0–0.07)
IMMATURE GRANS (ABS): 0.07 THOU/MM3 (ref 0–0.07)
IMMATURE GRANULOCYTES: 0.3 %
IMMATURE GRANULOCYTES: 0.5 %
LYMPHOCYTES # BLD: 4.7 %
LYMPHOCYTES # BLD: 5.2 %
LYMPHOCYTES ABSOLUTE: 0.6 THOU/MM3 (ref 1–4.8)
LYMPHOCYTES ABSOLUTE: 0.7 THOU/MM3 (ref 1–4.8)
MCH RBC QN AUTO: 29.6 PG (ref 26–33)
MCH RBC QN AUTO: 29.6 PG (ref 26–33)
MCHC RBC AUTO-ENTMCNC: 31.6 GM/DL (ref 32.2–35.5)
MCHC RBC AUTO-ENTMCNC: 31.6 GM/DL (ref 32.2–35.5)
MCV RBC AUTO: 93.6 FL (ref 81–99)
MCV RBC AUTO: 93.6 FL (ref 81–99)
MONOCYTES # BLD: 6.4 %
MONOCYTES # BLD: 6.7 %
MONOCYTES ABSOLUTE: 0.8 THOU/MM3 (ref 0.4–1.3)
MONOCYTES ABSOLUTE: 0.9 THOU/MM3 (ref 0.4–1.3)
NUCLEATED RED BLOOD CELLS: 0 /100 WBC
NUCLEATED RED BLOOD CELLS: 0 /100 WBC
PLATELET # BLD: 206 THOU/MM3 (ref 130–400)
PLATELET # BLD: 213 THOU/MM3 (ref 130–400)
PMV BLD AUTO: 10 FL (ref 9.4–12.4)
PMV BLD AUTO: 9.6 FL (ref 9.4–12.4)
POTASSIUM REFLEX MAGNESIUM: 4 MEQ/L (ref 3.5–5.2)
POTASSIUM SERPL-SCNC: 3.9 MEQ/L (ref 3.5–5.2)
POTASSIUM SERPL-SCNC: 4 MEQ/L (ref 3.5–5.2)
RBC # BLD: 3.14 MILL/MM3 (ref 4.2–5.4)
RBC # BLD: 3.28 MILL/MM3 (ref 4.2–5.4)
SEG NEUTROPHILS: 87.6 %
SEG NEUTROPHILS: 87.8 %
SEGMENTED NEUTROPHILS ABSOLUTE COUNT: 10.4 THOU/MM3 (ref 1.8–7.7)
SEGMENTED NEUTROPHILS ABSOLUTE COUNT: 12.2 THOU/MM3 (ref 1.8–7.7)
SODIUM BLD-SCNC: 132 MEQ/L (ref 135–145)
SODIUM BLD-SCNC: 140 MEQ/L (ref 135–145)
WBC # BLD: 11.8 THOU/MM3 (ref 4.8–10.8)
WBC # BLD: 13.9 THOU/MM3 (ref 4.8–10.8)

## 2020-10-09 PROCEDURE — 99024 POSTOP FOLLOW-UP VISIT: CPT | Performed by: PHYSICIAN ASSISTANT

## 2020-10-09 PROCEDURE — 2580000003 HC RX 258: Performed by: PHYSICIAN ASSISTANT

## 2020-10-09 PROCEDURE — 6360000002 HC RX W HCPCS: Performed by: PHYSICIAN ASSISTANT

## 2020-10-09 PROCEDURE — 99232 SBSQ HOSP IP/OBS MODERATE 35: CPT | Performed by: INTERNAL MEDICINE

## 2020-10-09 PROCEDURE — 80048 BASIC METABOLIC PNL TOTAL CA: CPT

## 2020-10-09 PROCEDURE — 82948 REAGENT STRIP/BLOOD GLUCOSE: CPT

## 2020-10-09 PROCEDURE — 36415 COLL VENOUS BLD VENIPUNCTURE: CPT

## 2020-10-09 PROCEDURE — 6370000000 HC RX 637 (ALT 250 FOR IP): Performed by: PHYSICIAN ASSISTANT

## 2020-10-09 PROCEDURE — 85025 COMPLETE CBC W/AUTO DIFF WBC: CPT

## 2020-10-09 PROCEDURE — 1200000000 HC SEMI PRIVATE

## 2020-10-09 RX ADMIN — INSULIN LISPRO 2 UNITS: 100 INJECTION, SOLUTION INTRAVENOUS; SUBCUTANEOUS at 07:29

## 2020-10-09 RX ADMIN — INSULIN LISPRO 4 UNITS: 100 INJECTION, SOLUTION INTRAVENOUS; SUBCUTANEOUS at 18:09

## 2020-10-09 RX ADMIN — CEFOXITIN SODIUM 2 G: 2 POWDER, FOR SOLUTION INTRAVENOUS at 13:27

## 2020-10-09 RX ADMIN — AMLODIPINE BESYLATE 10 MG: 10 TABLET ORAL at 07:53

## 2020-10-09 RX ADMIN — TIMOLOL MALEATE 1 DROP: 5 SOLUTION/ DROPS OPHTHALMIC at 07:56

## 2020-10-09 RX ADMIN — DOCUSATE SODIUM 50 MG AND SENNOSIDES 8.6 MG 1 TABLET: 8.6; 5 TABLET, FILM COATED ORAL at 20:54

## 2020-10-09 RX ADMIN — OXYBUTYNIN CHLORIDE 10 MG: 10 TABLET, EXTENDED RELEASE ORAL at 07:54

## 2020-10-09 RX ADMIN — Medication 10 ML: at 20:54

## 2020-10-09 RX ADMIN — LISINOPRIL 20 MG: 20 TABLET ORAL at 07:54

## 2020-10-09 RX ADMIN — CEFOXITIN SODIUM 2 G: 2 POWDER, FOR SOLUTION INTRAVENOUS at 05:30

## 2020-10-09 RX ADMIN — ACETAMINOPHEN 650 MG: 325 TABLET ORAL at 05:30

## 2020-10-09 RX ADMIN — TIMOLOL MALEATE 1 DROP: 5 SOLUTION/ DROPS OPHTHALMIC at 20:54

## 2020-10-09 RX ADMIN — BACITRACIN, NEOMYCIN, POLYMYXIN B 1 G: 400; 3.5; 5 OINTMENT TOPICAL at 20:55

## 2020-10-09 RX ADMIN — SODIUM CHLORIDE: 9 INJECTION, SOLUTION INTRAVENOUS at 00:59

## 2020-10-09 RX ADMIN — PRAVASTATIN SODIUM 40 MG: 40 TABLET ORAL at 20:54

## 2020-10-09 RX ADMIN — INSULIN LISPRO 2 UNITS: 100 INJECTION, SOLUTION INTRAVENOUS; SUBCUTANEOUS at 13:27

## 2020-10-09 ASSESSMENT — PAIN SCALES - GENERAL
PAINLEVEL_OUTOF10: 0
PAINLEVEL_OUTOF10: 1
PAINLEVEL_OUTOF10: 0

## 2020-10-09 ASSESSMENT — PAIN SCALES - WONG BAKER
WONGBAKER_NUMERICALRESPONSE: 0
WONGBAKER_NUMERICALRESPONSE: 0

## 2020-10-09 NOTE — PROGRESS NOTES
[facility]   Nephrology progress note  10/9/2020, 9:07 AM      Pt Name:    Mark Dowling  MRN:     [id number]     [place]:    1945  Admit Date:    10/8/2020  6:08 AM  Primary Care Physician:  Rick Matute DO   Room number  5K-05/005-A    Chief Complaint: Nephrology following for CKD    Subjective:  Patient seen and examined  Denies any chest pain   no BM yet  Tolerating IV fluids well    Objective:  24HR INTAKE/OUTPUT:      Intake/Output Summary (Last 24 hours) at 10/9/2020 0907  Last data filed at 10/9/2020 0342  Gross per 24 hour   Intake 4939.32 ml   Output 2300 ml   Net 2639.32 ml     I/O last 3 completed shifts: In: 5939.3 [P.O.:560; I.V.:5379.3]  Out: 2300 [Urine:2100; Blood:200]  No intake/output data recorded. Admission weight: 164 lb 3.2 oz (74.5 kg)  Wt Readings from Last 3 Encounters:   10/08/20 176 lb 3 oz (79.9 kg)   10/01/20 165 lb (74.8 kg)   09/11/20 160 lb (72.6 kg)     Body mass index is 33.29 kg/m². Physical examination  VITALS:     Vitals:    10/08/20 2338 10/09/20 0330 10/09/20 0528 10/09/20 0749   BP: (!) 146/66 (!) 142/65  (!) 145/65   Pulse: 87 89  78   Resp: 18 16  16   Temp: 99.3 °F (37.4 °C) 99.2 °F (37.3 °C)  98.3 °F (36.8 °C)   TempSrc: Oral Oral Oral Oral   SpO2: 96% 93%  93%   Weight:       Height:         General Appearance: alert and cooperative with exam, appears comfortable, no distress  Mouth/Throat: Oral mucosa moist  Neck: No JVD  Lungs: Air entry B/L, no rales, no use of accessory muscles  Heart:  S1, S2 heard  GI: soft, non-tender, no guarding  Extremities: No significant LE edema      Lab Data  CBC:   Recent Labs     10/08/20  1634   WBC 12.0*   HGB 11.5*   HCT 36.4*        BMP:  Recent Labs     10/08/20  1634      K 4.3      CO2 21*   BUN 24*   CREATININE 1.5*   GLUCOSE 248*   CALCIUM 8.6     Hepatic: No results for input(s): LABALBU, AST, ALT, ALB, BILITOT, ALKPHOS in the last 72 hours.       Meds:  Infusion:    sodium

## 2020-10-09 NOTE — CARE COORDINATION
10/9/20, 7:26 AM EDT  DISCHARGE PLANNING EVALUATION:    Marylee Elam       Admitted from: Surgery 10/8/2020/ [address] day: 1   Location: [facility] Reason for admit: Left renal mass [N28.89] Status: IP  Admit order signed?: yes  PMH:  has a past medical history of Cataracts, bilateral, Diabetes mellitus ([address] Glaucoma, History of blood transfusion, Hyperlipidemia, Hypertension, Nausea & vomiting, and PONV (postoperative nausea and vomiting). Procedure: 10/8 Robotic Assisted Laparoscopic Left Partial Nephrectomy, Left Renal Biopsy  Pertinent abnormal Imaging:none  Medications:  Scheduled Meds:   amLODIPine  10 mg Oral Daily    lisinopril  20 mg Oral Daily    pravastatin  40 mg Oral Nightly    timolol  1 drop Both Eyes BID    sodium chloride flush  10 mL Intravenous 2 times per day    neomycin-bacitracin-polymyxin   Topical BID    insulin lispro  0-12 Units Subcutaneous TID WC    insulin lispro  0-6 Units Subcutaneous Nightly    cefOXitin  2 g Intravenous Q8H    sennosides-docusate sodium  1 tablet Oral BID    oxybutynin  10 mg Oral Daily     Continuous Infusions:   sodium chloride 100 mL/hr at 10/09/20 0059    dextrose        Pertinent Info/Orders/Treatment Plan:  Presented to surgery for planned procedure. Urolgy following. Nephro consult for CKD. Daily wts. I/O. Hines. IVF. IV mefoxin. Ditropan. Pain control. Creatinine 1.4, bun 24. Diet: DIET CLEAR LIQUID;   Smoking status:  reports that she has never smoked.  She has never used smokeless tobacco.   PCP: Kalie Duncan DO  Readmission 30 days or less: no  Readmission Risk Score: 11%    Discharge Planning Evaluation  Current Residence:  Private Residence  Living Arrangements:  Alone   Support Systems:  Children, Family Members  Current Services PTA:     Potential Assistance Needed:  N/A  Potential Assistance Purchasing Medications:  No  Does patient want to participate in local refill/ meds to beds program?  No  Type of Home Care Services: PT, OT, Nursing Services  Patient expects to be discharged to:  back to home  Expected Discharge date:  10/10/20  Follow Up Appointment: Best Day/ Time: Monday AM    Patient Goals/Plan/Treatment Preferences: Spoke with patient, she plans to return home alone at discharge. Her daughter lives 2 houses away and assist when needed. She does not feel HH or dme is needed. Assisted patient to bathroom, used walker as this was patient's first time up. Patient did well and did not rely on walker. + BM and void. Assisted patietn back to chair. Urology CNP in room to see patient. Nurse updated on above and need for chair alarm to be set. Transportation/Food Security/Housekeeping Addressed:  No issues identified.     Evaluation: no

## 2020-10-09 NOTE — PLAN OF CARE
Problem: Pain:  Goal: Pain level will decrease  Description: Pain level will decrease  Outcome: Ongoing  Note: Pt report pain at 0 on scale. Problem: Falls - Risk of:  Goal: Will remain free from falls  Description: Will remain free from falls  Outcome: Ongoing  Note: Pt using call light appropriately to call for assistance with ambulation to the bathroom and to chair. Pt is also compliant with use of non-skid slippers. Pt reports understanding of fall prevention when discussed. Problem: Urinary Elimination:  Goal: Complications related to the disease process, condition or treatment will be avoided or minimized  Outcome: Ongoing  Note: Hines catheter in place. Pt voiding adequate amounts this shift. Problem: Skin Integrity:  Goal: Skin integrity will stabilize  Description: Skin integrity will stabilize  Outcome: Ongoing  Note: Pt turns self as recommended. Problem: Cardiovascular  Goal: No DVT, peripheral vascular complications  Outcome: Ongoing  Note: Pt without s/s of DVT. Pt able to take prescribed anticoagulants and leodan hose and SCD,S in place to help prevent development of DVT. Problem: Respiratory  Goal: O2 Sat > 90%  Outcome: Ongoing  Note: Pt sats >90 on 2 LO2 per nasal cannula. No shortness of breath noted. Lungs clear, uses IS, and able to C&DB  as ordered. Care plan reviewed with patient. Patient verbalize understanding of the plan of care and contribute to goal setting.

## 2020-10-09 NOTE — PROGRESS NOTES
Tae Dodson MD  Urology Progress Note    Subjective: Remy Cadet is a 67 y.o. female. s/p ROBOTIC LEFT PARTIAL NEPHRECTOMY on 10/8/2020    His/Her current Diet is: DIET CLEAR LIQUID;.    Since the previous note, the patient reports the following:  No acute issues overnight. Low grade temp overnight. No nausea or vomiting, tolerating clear liquids. No chest pain  No calf pain. Pain Controlled. Hines removed at 6am.   On NC, states her oxygen level dropped slightly overnight.       Vitals and Labs:  Patient Vitals for the past 24 hrs:   BP Temp Temp src Pulse Resp SpO2 Height Weight   10/09/20 0528 -- -- Oral -- -- -- -- --   10/09/20 0330 (!) 142/65 99.2 °F (37.3 °C) Oral 89 16 93 % -- --   10/08/20 2338 (!) 146/66 99.3 °F (37.4 °C) Oral 87 18 96 % -- --   10/08/20 2059 (!) 163/80 98.7 °F (37.1 °C) Oral 102 18 93 % -- --   10/08/20 1720 -- -- -- -- -- 95 % -- --   10/08/20 1637 (!) 163/79 98 °F (36.7 °C) Oral 91 20 95 % 5' 1\" (1.549 m) 176 lb 3 oz (79.9 kg)   10/08/20 1513 (!) 161/76 98.4 °F (36.9 °C) Tympanic 89 16 94 % -- --   10/08/20 1420 (!) 155/73 -- -- 90 16 96 % -- --   10/08/20 1342 (!) 153/72 96.5 °F (35.8 °C) Temporal 86 16 99 % -- --   10/08/20 1325 -- 97.6 °F (36.4 °C) -- 87 16 99 % -- --   10/08/20 1320 (!) 146/69 -- -- 84 27 100 % -- --   10/08/20 1315 (!) 146/69 -- -- 84 20 95 % -- --   10/08/20 1310 (!) 146/72 -- -- 83 18 99 % -- --   10/08/20 1305 (!) 158/70 -- -- 82 20 100 % -- --   10/08/20 1300 (!) 166/74 -- -- 81 19 99 % -- --   10/08/20 1255 (!) 163/74 -- -- 82 17 99 % -- --   10/08/20 1250 (!) 151/80 96.2 °F (35.7 °C) -- 85 14 100 % -- --   10/08/20 1245 (!) 150/80 -- -- 85 15 98 % -- --   10/08/20 1240 (!) 164/78 -- -- 87 10 100 % -- --   10/08/20 1235 (!) 165/75 -- -- 86 11 100 % -- --   10/08/20 1230 (!) 172/77 -- -- 87 15 100 % -- --   10/08/20 1225 (!) 164/75 -- -- 85 10 100 % -- --   10/08/20 1220 (!) 165/79 -- -- 86 10 100 % -- --   10/08/20 1215 (!) 171/80 -- -- 83 14 100 % -- --   10/08/20 1210 (!) 157/75 -- -- 83 11 100 % -- --   10/08/20 1205 (!) 161/78 -- -- 80 17 100 % -- --   10/08/20 1200 (!) 150/73 -- -- 79 16 100 % -- --   10/08/20 1155 (!) 155/75 -- -- 79 14 100 % -- --   10/08/20 1150 (!) 148/68 -- -- 77 17 100 % -- --   10/08/20 1144 (!) 148/71 96.1 °F (35.6 °C) Temporal 71 15 100 % -- --     I/O last 3 completed shifts: In: 5939.3 [P.O.:560; I.V.:5379.3]  Out: 2300 [Urine:2100; Blood:200]    Recent Labs     10/08/20  1634   WBC 12.0*   HGB 11.5*   HCT 36.4*   MCV 92.2        Recent Labs     10/08/20  1634      K 4.3      CO2 21*   BUN 24*   CREATININE 1.5*       No results for input(s): COLORU, PHUR, LABCAST, WBCUA, RBCUA, MUCUS, TRICHOMONAS, YEAST, BACTERIA, CLARITYU, SPECGRAV, LEUKOCYTESUR, UROBILINOGEN, Flavia Reveal in the last 72 hours. Invalid input(s): NITRATE, GLUCOSEUKETONESUAMORPHOUS    Physical Exam:  No acute distress. Awake, alert and oriented. Neck is supple  Regular rate and rhythm. Normal peripheral pulses  No accessory muscles of inspiration. Symmetric chest rise  Abdomen soft, non-tender, non-distended. No CVA tenderness. Incisions clean and dry. No calf pain. Minimal/no edema in bilateral lower extremities. Skin is warm, dry  Psych: mood, affect normal    Additional Lab/Culture results:     Imaging Reviewed:     Dinah Holt MD independently reviewed the images and verified the radiology reports from:    No results found.     Impression:    Patient Active Problem List   Diagnosis    Hyperlipidemia    Benign essential HTN    Left renal mass       s/p ROBOTIC LEFT PARTIAL NEPHRECTOMY on 10/8/2020    Plan:  Advance diet today  Hines removed  Pain control  Encourage ambulation  Johnny Galeano MD  7:20 AM 10/9/2020

## 2020-10-09 NOTE — PROGRESS NOTES
Patient doing well this morning. She denies any chest pain/pressure, dyspnea, N/V, or leg pain. She reports mild lightheadedness with ambulation. Using walker to ambulate due to mild weakness. Passing small amount of flatus. Small bowel movement. Hines removed this morning. Denies dysuria, urinary retention, gross hematuria, urgency, or frequency. Tolerating clear liquids. Denies any post-operative pain. Vitals:    10/08/20 2338 10/09/20 0330 10/09/20 0528 10/09/20 0749   BP: (!) 146/66 (!) 142/65  (!) 145/65   Pulse: 87 89  78   Resp: 18 16  16   Temp: 99.3 °F (37.4 °C) 99.2 °F (37.3 °C)  98.3 °F (36.8 °C)   TempSrc: Oral Oral Oral Oral   SpO2: 96% 93%  93%   Weight:       Height:             Intake/Output Summary (Last 24 hours) at 10/9/2020 1035  Last data filed at 10/9/2020 0342  Gross per 24 hour   Intake 3939.32 ml   Output 2070 ml   Net 1869.32 ml       Labs  Recent Labs     10/08/20  1634 10/09/20  0801   WBC 12.0* 11.8*   HGB 11.5* 9.3*   HCT 36.4* 29.4*    213    140   K 4.3 4.0    108   CO2 21* 21*   BUN 24* 22   CREATININE 1.5* 1.9*   CALCIUM 8.6 8.1*       Exam  General: Alert and oriented x 3. In NAD  Heart: Regular rate and rhythm. S1 and S1 normal  Lungs: Slightly diminished in the bases, otherwise clear without rales, rhonchi, or wheezes  Abdomen: Soft. Very little tenderness to light palpation. Mildly distended. Ventral hernia noted. Incisions are clean, dry, intact without erythema, ecchymosis, or induration. Hypoactive bowel sounds. No CVA tenderness bilaterally. Ext: No pitting edema or calf tenderness bilaterally. Assessment and Plan  1. Left Renal Mass- Status post RAL Left Partial Nephrectomy with Left Renal Biopsies- POD #1. Hemoglobin 9.3 this morning. May be a dilutional changed but 11.9 pre-op . Will hold Heparin subcutaneous at this time and recheck labs at 14:00. Ambulate. IS. Bowel stimulation. Advance diet to regular. Heplock IV.      2. CKD III/Nephrotic

## 2020-10-09 NOTE — PLAN OF CARE
Problem: Pain:  Goal: Pain level will decrease  Description: Pain level will decrease  Outcome: Ongoing  Denied pain this shift    Problem: Falls - Risk of:  Goal: Will remain free from falls  Description: Will remain free from falls  Outcome: Ongoing  Falling star prevention in place. Bed and chair alarms in use. Call light in reach. Purposeful hourly rounding. Problem: Serum Glucose Level - Abnormal:  Goal: Ability to maintain appropriate glucose levels will improve  Description: Ability to maintain appropriate glucose levels will improve  Outcome: Ongoing  Ability to maintain appropriate glucose levels improving since admission. Problem: Infection:  Goal: Will remain free from infection  Description: Will remain free from infection  Outcome: Ongoing  No signs or symptoms of infection noted. Problem: Safety:  Goal: Free from accidental physical injury  Description: Free from accidental physical injury  Outcome: Ongoing  No injury this shift    Problem: Cardiovascular  Goal: No DVT, peripheral vascular complications  Outcome: Ongoing  No signs or symptoms of DVT noted. Negative letty's sign. Problem: Respiratory  Goal: O2 Sat > 90%  Outcome: Ongoing  O2 Sat > 90% this shift    Problem: Nutrition  Goal: Optimal nutrition therapy  Outcome: Ongoing  Tolerating current diet and po fluids well.

## 2020-10-10 ENCOUNTER — APPOINTMENT (OUTPATIENT)
Dept: GENERAL RADIOLOGY | Age: 75
DRG: 656 | End: 2020-10-10
Attending: UROLOGY
Payer: MEDICARE

## 2020-10-10 LAB
ANION GAP SERPL CALCULATED.3IONS-SCNC: 13 MEQ/L (ref 8–16)
BUN BLDV-MCNC: 23 MG/DL (ref 7–22)
CALCIUM SERPL-MCNC: 8.8 MG/DL (ref 8.5–10.5)
CHLORIDE BLD-SCNC: 108 MEQ/L (ref 98–111)
CO2: 20 MEQ/L (ref 23–33)
CREAT SERPL-MCNC: 1.8 MG/DL (ref 0.4–1.2)
GFR SERPL CREATININE-BSD FRML MDRD: 27 ML/MIN/1.73M2
GLUCOSE BLD-MCNC: 180 MG/DL (ref 70–108)
GLUCOSE BLD-MCNC: 194 MG/DL (ref 70–108)
GLUCOSE BLD-MCNC: 226 MG/DL (ref 70–108)
GLUCOSE BLD-MCNC: 258 MG/DL (ref 70–108)
POTASSIUM SERPL-SCNC: 3.9 MEQ/L (ref 3.5–5.2)
SODIUM BLD-SCNC: 141 MEQ/L (ref 135–145)

## 2020-10-10 PROCEDURE — 2580000003 HC RX 258: Performed by: PHYSICIAN ASSISTANT

## 2020-10-10 PROCEDURE — 6370000000 HC RX 637 (ALT 250 FOR IP): Performed by: NURSE PRACTITIONER

## 2020-10-10 PROCEDURE — 1200000000 HC SEMI PRIVATE

## 2020-10-10 PROCEDURE — 36415 COLL VENOUS BLD VENIPUNCTURE: CPT

## 2020-10-10 PROCEDURE — 80048 BASIC METABOLIC PNL TOTAL CA: CPT

## 2020-10-10 PROCEDURE — 6360000002 HC RX W HCPCS: Performed by: NURSE PRACTITIONER

## 2020-10-10 PROCEDURE — 6370000000 HC RX 637 (ALT 250 FOR IP): Performed by: PHYSICIAN ASSISTANT

## 2020-10-10 PROCEDURE — 82948 REAGENT STRIP/BLOOD GLUCOSE: CPT

## 2020-10-10 PROCEDURE — 6360000002 HC RX W HCPCS: Performed by: PHYSICIAN ASSISTANT

## 2020-10-10 PROCEDURE — 99232 SBSQ HOSP IP/OBS MODERATE 35: CPT | Performed by: INTERNAL MEDICINE

## 2020-10-10 PROCEDURE — 71046 X-RAY EXAM CHEST 2 VIEWS: CPT

## 2020-10-10 RX ORDER — HYDRALAZINE HYDROCHLORIDE 20 MG/ML
5 INJECTION INTRAMUSCULAR; INTRAVENOUS EVERY 6 HOURS PRN
Status: DISCONTINUED | OUTPATIENT
Start: 2020-10-10 | End: 2020-10-10

## 2020-10-10 RX ORDER — HYDRALAZINE HYDROCHLORIDE 20 MG/ML
10 INJECTION INTRAMUSCULAR; INTRAVENOUS EVERY 6 HOURS PRN
Status: DISCONTINUED | OUTPATIENT
Start: 2020-10-10 | End: 2020-10-12 | Stop reason: HOSPADM

## 2020-10-10 RX ORDER — LEVOFLOXACIN 750 MG/1
750 TABLET ORAL EVERY OTHER DAY
Status: DISCONTINUED | OUTPATIENT
Start: 2020-10-10 | End: 2020-10-12 | Stop reason: HOSPADM

## 2020-10-10 RX ORDER — FUROSEMIDE 10 MG/ML
20 INJECTION INTRAMUSCULAR; INTRAVENOUS ONCE
Status: COMPLETED | OUTPATIENT
Start: 2020-10-10 | End: 2020-10-10

## 2020-10-10 RX ADMIN — TIMOLOL MALEATE 1 DROP: 5 SOLUTION/ DROPS OPHTHALMIC at 11:49

## 2020-10-10 RX ADMIN — HYDRALAZINE HYDROCHLORIDE 5 MG: 20 INJECTION, SOLUTION INTRAMUSCULAR; INTRAVENOUS at 21:26

## 2020-10-10 RX ADMIN — LEVOFLOXACIN 750 MG: 750 TABLET, FILM COATED ORAL at 18:25

## 2020-10-10 RX ADMIN — FUROSEMIDE 20 MG: 10 INJECTION, SOLUTION INTRAMUSCULAR; INTRAVENOUS at 18:24

## 2020-10-10 RX ADMIN — AMLODIPINE BESYLATE 10 MG: 10 TABLET ORAL at 11:28

## 2020-10-10 RX ADMIN — INSULIN LISPRO 4 UNITS: 100 INJECTION, SOLUTION INTRAVENOUS; SUBCUTANEOUS at 13:02

## 2020-10-10 RX ADMIN — Medication 10 ML: at 20:50

## 2020-10-10 RX ADMIN — HYDRALAZINE HYDROCHLORIDE 10 MG: 20 INJECTION, SOLUTION INTRAMUSCULAR; INTRAVENOUS at 23:31

## 2020-10-10 RX ADMIN — Medication 10 ML: at 11:50

## 2020-10-10 RX ADMIN — DOCUSATE SODIUM 50 MG AND SENNOSIDES 8.6 MG 1 TABLET: 8.6; 5 TABLET, FILM COATED ORAL at 20:49

## 2020-10-10 RX ADMIN — ACETAMINOPHEN 650 MG: 325 TABLET ORAL at 11:28

## 2020-10-10 RX ADMIN — TIMOLOL MALEATE 1 DROP: 5 SOLUTION/ DROPS OPHTHALMIC at 20:50

## 2020-10-10 RX ADMIN — PRAVASTATIN SODIUM 40 MG: 40 TABLET ORAL at 20:49

## 2020-10-10 RX ADMIN — OXYBUTYNIN CHLORIDE 10 MG: 10 TABLET, EXTENDED RELEASE ORAL at 11:28

## 2020-10-10 RX ADMIN — DOCUSATE SODIUM 50 MG AND SENNOSIDES 8.6 MG 1 TABLET: 8.6; 5 TABLET, FILM COATED ORAL at 11:28

## 2020-10-10 ASSESSMENT — PAIN SCALES - GENERAL
PAINLEVEL_OUTOF10: 0

## 2020-10-10 ASSESSMENT — PAIN SCALES - WONG BAKER
WONGBAKER_NUMERICALRESPONSE: 0
WONGBAKER_NUMERICALRESPONSE: 0

## 2020-10-10 NOTE — PROGRESS NOTES
[facility]         Renal Inpatient Follow-Up note         10/10/2020 12:14 PM    Pt Name:   Ericka Trujillo  YOB: 1945  Attending:   Pk Quiñonez MD    Chief Complaint : Ericka Trujillo is a 67 y.o. female being followed by nephrology for PREETHI/CKD    Interval History :   Patient seen and examined by me. No distress  Feels okay denies any chest pain no shortness of breath  Also had some fever     Scheduled Medications :    amLODIPine  10 mg Oral Daily    [Held by provider] lisinopril  20 mg Oral Daily    pravastatin  40 mg Oral Nightly    timolol  1 drop Both Eyes BID    sodium chloride flush  10 mL Intravenous 2 times per day    neomycin-bacitracin-polymyxin   Topical BID    insulin lispro  0-12 Units Subcutaneous TID WC    insulin lispro  0-6 Units Subcutaneous Nightly    sennosides-docusate sodium  1 tablet Oral BID    oxybutynin  10 mg Oral Daily      sodium chloride Stopped (10/09/20 1125)    dextrose         Vitals :  BP (!) 148/70   Pulse 90   Temp 99.6 °F (37.6 °C) (Oral)   Resp 16   Ht 5' 1\" (1.549 m)   Wt 147 lb 1 oz (66.7 kg)   SpO2 91%   BMI 27.79 kg/m²     24HR INTAKE/OUTPUT:      Intake/Output Summary (Last 24 hours) at 10/10/2020 1214  Last data filed at 10/10/2020 1202  Gross per 24 hour   Intake 730 ml   Output 0 ml   Net 730 ml     Last 3 weights  Wt Readings from Last 3 Encounters:   10/10/20 147 lb 1 oz (66.7 kg)   10/01/20 165 lb (74.8 kg)   09/11/20 160 lb (72.6 kg)           Physical Exam :  General Appearance:  Well developed.  No distress  Mouth/Throat:  Oral mucosa moist  Neck:  Supple, no JVD  Lungs:  Breath sounds: clear  Heart[de-identified]  S1,S2 heard  Abdomen:  Soft, non - tender  Musculoskeletal:  Edema -no significant edema noted         Last 3 CBC   Recent Labs     10/08/20  1634 10/09/20  0801 10/09/20  1535   WBC 12.0* 11.8* 13.9*   RBC 3.95* 3.14* 3.28*   HGB 11.5* 9.3* 9.7*   HCT 36.4* 29.4* 30.7*    213 206     Last 3 CMP  Recent Labs     10/09/20  0801 10/09/20  1535 10/10/20  0703    132* 141   K 4.0  4.0 3.9 3.9    103 108   CO2 21* 21* 20*   BUN 22 24* 23*   CREATININE 1.9* 2.0* 1.8*   CALCIUM 8.1* 8.3* 8.8             ASSESSMENT / Plan   1 Renal -mild PREETHI on chronic kidney disease stage III overall renal function appears to be stable  ? Subtle rise in creatinine probably post partial nephrectomy. ? Follow BMP in the morning    2 Electrolytes -mild hyponatremia which appears to be improved  3 Left renal mass status post partial left nephrectomy  4 Nephrotic range proteinuria status post renal biopsy  5 Essential hypertension running reasonable lisinopril on hold  6 Mild metabolic acidosis  7 Running some fever chest x-ray has been ordered  8 Meds reviewed and discussed with patient and family at bedside    RICKY Soto D.  [facility].

## 2020-10-10 NOTE — PROGRESS NOTES
rise  Abdomen soft, non-tender, non-distended. No CVA tenderness. No calf pain. Minimal/no edema in bilateral lower extremities. Skin is warm, dry  Psych: mood, affect normal    Additional Lab/Culture results:     Imaging Reviewed:     Fly Pak MD independently reviewed the images and verified the radiology reports from:    No results found. Impression:    Patient Active Problem List   Diagnosis    Hyperlipidemia    Benign essential HTN    Left renal mass    Stage 3b chronic kidney disease    H/O partial nephrectomy    Metabolic acidosis       s/p ROBOTIC LEFT PARTIAL NEPHRECTOMY on 10/8/2020    Plan:  CXR. Will monitor low grade fever. Pt is complaining of congestion, r/o pneumonia vs atelectasis. heplock IVF  Aggressive incentive spirometry  Ambulate  hgb stable. Wean off 02 is possible  Appreciate nephrology input.         Fly Pak MD  9:31 AM 10/10/2020

## 2020-10-10 NOTE — PROGRESS NOTES
Pt slept throughout shift. She denies pain. He surgical wounds are C,D,I. No drainage or bleeding noted. She ambulates to the bathroom with a standby assist. She is on 2L O2 via NC. Her SAO2 is 92%. She is pleasant and cooperative.

## 2020-10-10 NOTE — PLAN OF CARE
Problem: Pain:  Goal: Pain level will decrease  Description: Pain level will decrease  10/10/2020 0103 by Delmi oBogie RN  Outcome: Ongoing  10/9/2020 1603 by Annabelle Deal RN  Outcome: Ongoing  Goal: Control of acute pain  Description: Control of acute pain  Outcome: Ongoing  Goal: Control of chronic pain  Description: Control of chronic pain  Outcome: Ongoing  NOTE: Pt has had no c/o pain      Problem: Falls - Risk of:  Goal: Will remain free from falls  Description: Will remain free from falls  10/10/2020 0103 by Delmi Boogie RN  Outcome: Ongoing  10/9/2020 1603 by Annabelle Deal RN  Outcome: Ongoing  Goal: Absence of physical injury  Description: Absence of physical injury  Outcome: Ongoing  NOTE: Pt is free of falls and free of injury      Problem: Urinary Elimination:  Goal: Complications related to the disease process, condition or treatment will be avoided or minimized  Outcome: Ongoing   NOTE: Pt has good urinary o/p      Problem: Serum Glucose Level - Abnormal:  Goal: Ability to maintain appropriate glucose levels will improve  Description: Ability to maintain appropriate glucose levels will improve  10/10/2020 0103 by Delmi Boogie RN  Outcome: Ongoing  10/9/2020 1603 by Annabelle Deal RN  Outcome: Ongoing   NOTE: Pt's BS was 208 at bedtime.  2 Units of Lispro given      Problem: Safety:  Goal: Free from accidental physical injury  Description: Free from accidental physical injury  10/10/2020 0103 by Delmi Boogie RN  Outcome: Ongoing  10/9/2020 1603 by Annabelle Deal RN  Outcome: Ongoing  Goal: Free from intentional harm  Description: Free from intentional harm  Outcome: Ongoing  NOTE: Pt free from harm      Problem: Cardiovascular  Goal: No DVT, peripheral vascular complications  [id number] 0103 by Delmi Boogie RN  Outcome: Ongoing  10/9/2020 1603 by Annabelle Deal RN  Outcome: Ongoing  NOTE: Pt shows no signs of DVT  Problem: Respiratory  Goal: O2 Sat > 90%  10/10/2020 0103 by Maurice Lr RN  Outcome: Ongoing  10/9/2020 1603 by Pato Araiza RN  Outcome: Ongoing   NOTE: Pt on 2L NC.  SAO2 92-93%

## 2020-10-11 LAB
ANION GAP SERPL CALCULATED.3IONS-SCNC: 12 MEQ/L (ref 8–16)
BASOPHILS # BLD: 0.3 %
BASOPHILS ABSOLUTE: 0 THOU/MM3 (ref 0–0.1)
BUN BLDV-MCNC: 22 MG/DL (ref 7–22)
CALCIUM SERPL-MCNC: 8.8 MG/DL (ref 8.5–10.5)
CHLORIDE BLD-SCNC: 104 MEQ/L (ref 98–111)
CO2: 22 MEQ/L (ref 23–33)
CREAT SERPL-MCNC: 1.6 MG/DL (ref 0.4–1.2)
EOSINOPHIL # BLD: 1.1 %
EOSINOPHILS ABSOLUTE: 0.1 THOU/MM3 (ref 0–0.4)
ERYTHROCYTE [DISTWIDTH] IN BLOOD BY AUTOMATED COUNT: 12.3 % (ref 11.5–14.5)
ERYTHROCYTE [DISTWIDTH] IN BLOOD BY AUTOMATED COUNT: 41.4 FL (ref 35–45)
GFR SERPL CREATININE-BSD FRML MDRD: 31 ML/MIN/1.73M2
GLUCOSE BLD-MCNC: 191 MG/DL (ref 70–108)
GLUCOSE BLD-MCNC: 197 MG/DL (ref 70–108)
GLUCOSE BLD-MCNC: 202 MG/DL (ref 70–108)
GLUCOSE BLD-MCNC: 208 MG/DL (ref 70–108)
GLUCOSE BLD-MCNC: 282 MG/DL (ref 70–108)
HCT VFR BLD CALC: 29.6 % (ref 37–47)
HEMOGLOBIN: 9.4 GM/DL (ref 12–16)
IMMATURE GRANS (ABS): 0.08 THOU/MM3 (ref 0–0.07)
IMMATURE GRANULOCYTES: 0.7 %
LYMPHOCYTES # BLD: 6.5 %
LYMPHOCYTES ABSOLUTE: 0.7 THOU/MM3 (ref 1–4.8)
MCH RBC QN AUTO: 29.4 PG (ref 26–33)
MCHC RBC AUTO-ENTMCNC: 31.8 GM/DL (ref 32.2–35.5)
MCV RBC AUTO: 92.5 FL (ref 81–99)
MONOCYTES # BLD: 6.4 %
MONOCYTES ABSOLUTE: 0.7 THOU/MM3 (ref 0.4–1.3)
NUCLEATED RED BLOOD CELLS: 0 /100 WBC
PLATELET # BLD: 167 THOU/MM3 (ref 130–400)
PMV BLD AUTO: 9.9 FL (ref 9.4–12.4)
POTASSIUM SERPL-SCNC: 3.5 MEQ/L (ref 3.5–5.2)
RBC # BLD: 3.2 MILL/MM3 (ref 4.2–5.4)
SEG NEUTROPHILS: 85 %
SEGMENTED NEUTROPHILS ABSOLUTE COUNT: 9.4 THOU/MM3 (ref 1.8–7.7)
SODIUM BLD-SCNC: 138 MEQ/L (ref 135–145)
WBC # BLD: 11.1 THOU/MM3 (ref 4.8–10.8)

## 2020-10-11 PROCEDURE — 6370000000 HC RX 637 (ALT 250 FOR IP): Performed by: INTERNAL MEDICINE

## 2020-10-11 PROCEDURE — 6370000000 HC RX 637 (ALT 250 FOR IP): Performed by: PHYSICIAN ASSISTANT

## 2020-10-11 PROCEDURE — 6360000002 HC RX W HCPCS: Performed by: PHYSICIAN ASSISTANT

## 2020-10-11 PROCEDURE — 6360000002 HC RX W HCPCS: Performed by: NURSE PRACTITIONER

## 2020-10-11 PROCEDURE — 6370000000 HC RX 637 (ALT 250 FOR IP): Performed by: NURSE PRACTITIONER

## 2020-10-11 PROCEDURE — 99024 POSTOP FOLLOW-UP VISIT: CPT | Performed by: NURSE PRACTITIONER

## 2020-10-11 PROCEDURE — 2580000003 HC RX 258: Performed by: PHYSICIAN ASSISTANT

## 2020-10-11 PROCEDURE — 82948 REAGENT STRIP/BLOOD GLUCOSE: CPT

## 2020-10-11 PROCEDURE — APPNB45 APP NON BILLABLE 31-45 MINUTES: Performed by: NURSE PRACTITIONER

## 2020-10-11 PROCEDURE — 85025 COMPLETE CBC W/AUTO DIFF WBC: CPT

## 2020-10-11 PROCEDURE — 99232 SBSQ HOSP IP/OBS MODERATE 35: CPT | Performed by: INTERNAL MEDICINE

## 2020-10-11 PROCEDURE — 36415 COLL VENOUS BLD VENIPUNCTURE: CPT

## 2020-10-11 PROCEDURE — 1200000000 HC SEMI PRIVATE

## 2020-10-11 PROCEDURE — 6360000002 HC RX W HCPCS: Performed by: INTERNAL MEDICINE

## 2020-10-11 PROCEDURE — 80048 BASIC METABOLIC PNL TOTAL CA: CPT

## 2020-10-11 RX ORDER — HEPARIN SODIUM 5000 [USP'U]/ML
5000 INJECTION, SOLUTION INTRAVENOUS; SUBCUTANEOUS EVERY 8 HOURS SCHEDULED
Status: DISCONTINUED | OUTPATIENT
Start: 2020-10-11 | End: 2020-10-12 | Stop reason: HOSPADM

## 2020-10-11 RX ORDER — FUROSEMIDE 10 MG/ML
20 INJECTION INTRAMUSCULAR; INTRAVENOUS ONCE
Status: COMPLETED | OUTPATIENT
Start: 2020-10-11 | End: 2020-10-11

## 2020-10-11 RX ORDER — GLIPIZIDE 10 MG/1
10 TABLET ORAL 2 TIMES DAILY
Status: DISCONTINUED | OUTPATIENT
Start: 2020-10-11 | End: 2020-10-12 | Stop reason: HOSPADM

## 2020-10-11 RX ORDER — LISINOPRIL 20 MG/1
20 TABLET ORAL DAILY
Status: DISCONTINUED | OUTPATIENT
Start: 2020-10-11 | End: 2020-10-12 | Stop reason: HOSPADM

## 2020-10-11 RX ADMIN — OXYBUTYNIN CHLORIDE 10 MG: 10 TABLET, EXTENDED RELEASE ORAL at 08:03

## 2020-10-11 RX ADMIN — INSULIN LISPRO 4 UNITS: 100 INJECTION, SOLUTION INTRAVENOUS; SUBCUTANEOUS at 09:20

## 2020-10-11 RX ADMIN — DOCUSATE SODIUM 50 MG AND SENNOSIDES 8.6 MG 1 TABLET: 8.6; 5 TABLET, FILM COATED ORAL at 08:11

## 2020-10-11 RX ADMIN — GLIPIZIDE 10 MG: 10 TABLET ORAL at 13:49

## 2020-10-11 RX ADMIN — HEPARIN SODIUM 5000 UNITS: 5000 INJECTION INTRAVENOUS; SUBCUTANEOUS at 21:36

## 2020-10-11 RX ADMIN — LISINOPRIL 20 MG: 20 TABLET ORAL at 21:36

## 2020-10-11 RX ADMIN — HEPARIN SODIUM 5000 UNITS: 5000 INJECTION INTRAVENOUS; SUBCUTANEOUS at 13:47

## 2020-10-11 RX ADMIN — TIMOLOL MALEATE 1 DROP: 5 SOLUTION/ DROPS OPHTHALMIC at 08:06

## 2020-10-11 RX ADMIN — INSULIN LISPRO 6 UNITS: 100 INJECTION, SOLUTION INTRAVENOUS; SUBCUTANEOUS at 18:12

## 2020-10-11 RX ADMIN — AMLODIPINE BESYLATE 10 MG: 10 TABLET ORAL at 08:03

## 2020-10-11 RX ADMIN — TIMOLOL MALEATE 1 DROP: 5 SOLUTION/ DROPS OPHTHALMIC at 21:37

## 2020-10-11 RX ADMIN — Medication 10 ML: at 08:04

## 2020-10-11 RX ADMIN — Medication 10 ML: at 21:36

## 2020-10-11 RX ADMIN — DOCUSATE SODIUM 50 MG AND SENNOSIDES 8.6 MG 1 TABLET: 8.6; 5 TABLET, FILM COATED ORAL at 21:36

## 2020-10-11 RX ADMIN — HYDRALAZINE HYDROCHLORIDE 10 MG: 20 INJECTION, SOLUTION INTRAMUSCULAR; INTRAVENOUS at 08:05

## 2020-10-11 RX ADMIN — PRAVASTATIN SODIUM 40 MG: 40 TABLET ORAL at 21:37

## 2020-10-11 RX ADMIN — FUROSEMIDE 20 MG: 10 INJECTION, SOLUTION INTRAMUSCULAR; INTRAVENOUS at 13:48

## 2020-10-11 RX ADMIN — INSULIN LISPRO 2 UNITS: 100 INJECTION, SOLUTION INTRAVENOUS; SUBCUTANEOUS at 13:47

## 2020-10-11 ASSESSMENT — PAIN SCALES - WONG BAKER
WONGBAKER_NUMERICALRESPONSE: 0
WONGBAKER_NUMERICALRESPONSE: 0

## 2020-10-11 ASSESSMENT — PAIN SCALES - GENERAL
PAINLEVEL_OUTOF10: 0

## 2020-10-11 NOTE — PROGRESS NOTES
[facility]         Renal Inpatient Follow-Up note         10/11/2020 12:45 PM    Pt Name:   Sherry Melo  YOB: 1945  Attending:   Enrique Quick MD    Chief Complaint : Sherry Melo is a 67 y.o. female being followed by nephrology for PREETHI/CKD    Interval History :   Patient seen and examined by me. No distress  Feels okay denies any chest pain no shortness of breath  Overall stable. Needing some oxygen support. Blood pressure running slightly higher as well     Scheduled Medications :    glipiZIDE  10 mg Oral BID    heparin (porcine)  5,000 Units Subcutaneous 3 times per day    levoFLOXacin  750 mg Oral Every Other Day    amLODIPine  10 mg Oral Daily    [Held by provider] lisinopril  20 mg Oral Daily    pravastatin  40 mg Oral Nightly    timolol  1 drop Both Eyes BID    sodium chloride flush  10 mL Intravenous 2 times per day    neomycin-bacitracin-polymyxin   Topical BID    insulin lispro  0-12 Units Subcutaneous TID WC    insulin lispro  0-6 Units Subcutaneous Nightly    sennosides-docusate sodium  1 tablet Oral BID    oxybutynin  10 mg Oral Daily      dextrose         Vitals :  BP (!) 156/72   Pulse 87   Temp 98.7 °F (37.1 °C) (Oral)   Resp 18   Ht 5' 1\" (1.549 m)   Wt 147 lb 1 oz (66.7 kg)   SpO2 95%   BMI 27.79 kg/m²     24HR INTAKE/OUTPUT:      Intake/Output Summary (Last 24 hours) at 10/11/2020 1245  Last data filed at 10/11/2020 0801  Gross per 24 hour   Intake 450 ml   Output 1950 ml   Net -1500 ml     Last 3 weights  Wt Readings from Last 3 Encounters:   10/10/20 147 lb 1 oz (66.7 kg)   10/01/20 165 lb (74.8 kg)   09/11/20 160 lb (72.6 kg)           Physical Exam :  General Appearance:  Well developed.  No distress  Mouth/Throat:  Oral mucosa moist  Neck:  Supple, no JVD  Lungs:  Breath sounds: clear  Heart[de-identified]  S1,S2 heard  Abdomen:  Soft, non - tender  Musculoskeletal:  Edema -no significant edema noted         Last 3 CBC   Recent Labs 10/09/20  0801 10/09/20  1535 10/11/20  0811   WBC 11.8* 13.9* 11.1*   RBC 3.14* 3.28* 3.20*   HGB 9.3* 9.7* 9.4*   HCT 29.4* 30.7* 29.6*    206 167     Last 3 CMP  Recent Labs     10/09/20  1535 10/10/20  0703 10/11/20  0811   * 141 138   K 3.9 3.9 3.5    108 104   CO2 21* 20* 22*   BUN 24* 23* 22   CREATININE 2.0* 1.8* 1.6*   CALCIUM 8.3* 8.8 8.8             ASSESSMENT / Plan   1 Renal -mild PREETHI on chronic kidney disease stage III overall renal function appears to be stable  ? Subtle rise in creatinine probably post partial nephrectomy. Now almost close to her baseline at 1.6  ? Follow BMP in the morning    2 Electrolytes -within normal limits  3 Left renal mass status post partial left nephrectomy  4 Nephrotic range proteinuria status post renal biopsy  5 Essential hypertension running slightly high renal function stable will restart lisinopril  6 Mild metabolic acidosis  7 Meds reviewed and discussed with patient and family at bedside also spoke with the nursing staff. Will give 1 dose of diuretic    RICKY Maria D.  [facility].

## 2020-10-11 NOTE — OP NOTE
Mehrdad Hoffmann   1945   [id number]      DATE:  10/8/2020    Surgeon:   Dr. Xavi Snowden M.D, MD    Assistant:  TYRON Marc    Pre op diagnosis: left side renal mass    Post op diagnosis: same    Procedure:   Robotic Laparascopic assisted left side partial nephrectomy  Left renal cortical biopsy    Anesthesia: General    Blood loss: 150 mL    Fluids: Crystalloid per anesthesia    Drains:  dickson catheter    DVT prophylaxis: EPC    Indications: Mehrdad Hoffmann is a 67 y.o. female with a left side renal mass and CKD. All treatment options were discussed. Risks, benefits, goals, alternatives, possible complications were discussed with patient. Patient elected for above mentioned procedures. Consent was signed. Patient elected to proceed. Details: Patient was brought back to the operating room. Laid in the supine position. EPC cuffs were placed on and functioning prior to anesthesia. Antibiotics were given. Dickson catheter was placed. General anesthesia was inducted. Patient was then placed in right side lateral decubitus position. Pressure points were padded. Axillary roll was placed. Patient was then prepped, draped in usual sterile standard fashion. Veress needle was used to obtain pneumo-peritoneum successfully. We entered the intra-abdominal cavity under direct visualization. Robotic ports were placed under direct visualizations. The Robot was docked. The white line of Toldt was cut and the colon was reflected. This was carried down to valley, and the ureter and gonadal vessel were identified. The tail of gerota's was lifted off the psoas and the kidney was lifted. We approached the hilum and the artery and vein were dissected out completely. We made an incision on the Gerota's fascia and used the Lap ultrasound to visualize the mass. We dissected out gerota's fat and visualized the mass. We outlined the margins of dissection. 12.5 g of mannitol was given.  Bulldog clamps were placed on the hilar vessels. We cut out the tumor entirely down to the collecting system. I did cut out a piece of cortical tissue for renal biopsy as requested by her nephrologist, Dr. Jaspreet Lim. No tumor spillage was seen. I did ligate an exposed branched vein that was coursing next to the tumor with vLock suture. The tumor was placed in the endocatch bag. We closed the resection defect using 3-0 V lock sutures, followed by 1-0 Vicryl interrupted stitched with the surgicell bolster. The hilum clamps were removed. The surgical bed was inspected and it was hemostatic. All needles were removed from the body and needle count was correct. The robot was undocked. The specimen was removed from one of the robotic port sites. The fascia was closed with interrupted 0-vicryl suture. The robotic ports were removed under direct visualization without evidence of bleeding. The skin was closed with 4-0 monocryl and reinforced with dermabond. Case was concluded and patient tolerated the procedure well. Anesthesia was reversed and patient was extubated, taken to recovery in stable conditon. Patient was admitted to the floor for routine post op care.

## 2020-10-11 NOTE — PROGRESS NOTES
Urology Progress Note    Chief Complaint: Left renal mass    POD # 3 Robotic laparoscopic assisted left side partial nephrectomy and left renal cortical biopsy by Dr. Virginia Cowan 10/8/2020. Subjective:     Pt resting in bed on 1LO2. RN just decreased from 2Ls. Pt denies SOB or chest pain. No significant abdominal or flank pain. Some tenderness with activity. Passing flatus. Had [facility] Friday. Tolerating diet without n/v. Ambulated in hallway 2 x yesterday. None yet today. Using incentive spirometer. Notes some \"numbness\" to left upper leg last night that improved with changing position. No focal neuro deficit on exam this am.            Vitals:  BP (!) 174/77   Pulse 102   Temp 99.3 °F (37.4 °C) (Oral)   Resp 16   Ht 5' 1\" (1.549 m)   Wt 147 lb 1 oz (66.7 kg)   SpO2 96%   BMI 27.79 kg/m²   Temp  Av.6 °F (37 °C)  Min: 98.1 °F (36.7 °C)  Max: 99.3 °F (37.4 °C)    Intake/Output Summary (Last 24 hours) at 10/11/2020 0930  Last data filed at 10/11/2020 0801  Gross per 24 hour   Intake 460 ml   Output 1950 ml   Net -1490 ml       Social History     Socioeconomic History    Marital status:       Spouse name: Not on file    Number of children: Not on file    Years of education: Not on file    Highest education level: Not on file   Occupational History     Comment: retired   Social Needs    Financial resource strain: Not on file    Food insecurity     Worry: Not on file     Inability: Not on file   [employer] needs     Medical: Not on file     Non-medical: Not on file   Tobacco Use    Smoking status: Never Smoker    Smokeless tobacco: Never Used   Substance and Sexual Activity    Alcohol use: No    Drug use: No    Sexual activity: Not on file   Lifestyle    Physical activity     Days per week: Not on file     Minutes per session: Not on file    Stress: Not on file   Relationships    Social connections     Talks on phone: Not on file     Gets together: Not on file Attends Scientologist service: Not on file     Active member of club or organization: Not on file     Attends meetings of clubs or organizations: Not on file     Relationship status: Not on file    Intimate partner violence     Fear of current or ex partner: Not on file     Emotionally abused: Not on file     Physically abused: Not on file     Forced sexual activity: Not on file   Other Topics Concern    Not on file   Social History Narrative    Not on file     Family History   Problem Relation Age of Onset    Cancer Mother     Heart Disease Father     Diabetes Sister     Diabetes Brother      Allergies   Allergen Reactions    Relafen [Nabumetone] Nausea Only    Sulfa Antibiotics Nausea Only         Constitutional: Alert and oriented times x3, no acute distress, and cooperative to examination with appropriate mood and affect. HEENT:   Head:         Normocephalic and atraumatic. Mucous membranes are normal.   Eyes:         EOM are normal. No scleral icterus. Nose:    The external appearance of the nose is normal  Ears: The ears appear normal to external inspection. Cardiovascular:       Normal rate, regular rhythm. Pulmonary/Chest:  Normal respiratory rate and rhthym. No use of accessory muscles. Crackles in Bilateral posterior lower lung fields. On 1 L O2. Abdominal:          Soft. Expected incisional tenderness. No guarding or rebound Active bowel sounds. Incisions with edges well approximated. No drainage or erythema  Musculoskeletal:    Normal range of motion. Exhibits no edema or tenderness of lower extremities. Extremities:    No cyanosis, clubbing, or edema present. Neurological:    Alert and oriented. CN's II-XII grossly intact. No focal neuro deficits on exam today.     Labs:  WBC:    Lab Results   Component Value Date    WBC 11.1 10/11/2020     Hemoglobin/Hematocrit:    Lab Results   Component Value Date    HGB 9.4 10/11/2020    HCT 29.6 10/11/2020     BMP:    Lab Results Component Value Date     10/11/2020    K 3.5 10/11/2020    K 4.0 10/09/2020     10/11/2020    CO2 22 10/11/2020    BUN 22 10/11/2020    LABALBU 4.2 07/09/2020    CREATININE 1.6 10/11/2020    CALCIUM 8.8 10/11/2020    LABGLOM 31 10/11/2020       Impression/plan:  1. Left renal mass--s/p RAL partial nephrectomy 10/8  2. Pneumonia--CXR with patchy LLL infiltrates--Levaquin 750 mg q48 hours for 7 days  3. Bilateral small pleural effusions--s/p lasix 20 mg IV x 1 10/10/2020. Had 1300 mls out following dose. Further diuretic dosing per nephrology recs  4. CKD III/Nephrotic range proteinuria--creatinine trending down to 1.6  5. Essential HTN, uncontrolled--Lisinopril on hold--? Resume today per nephrology  6. DM type II--glucose monitoring ac and hs with sliding scale--resume glipizide now--resume Januvia when ok with nephrology. Appreciate nephrology assistance. Further diuretics per their recs. Resume lisinopril and januvia when ok with nephrology. Advance activity. Continue to encourage incentive spirometer every 1 hour while awake. Up to chair TID and with meals and ambulate in hallway at least TID. Pt, RN, and daughter aware and agreeable. Wean O2 as able.       Kelsey Mackay, KEHINDE-CNP  10/11/20 9:30 AM  Urology

## 2020-10-11 NOTE — PLAN OF CARE
Problem: Pain:  Goal: Pain level will decrease  Description: Pain level will decrease  Note: Denies pain  Goal: Control of acute pain  Description: Control of acute pain  Outcome: Ongoing  Note: Denies pain  Goal: Control of chronic pain  Description: Control of chronic pain  Outcome: Ongoing  Note: Denies pain     Problem: Falls - Risk of:  Goal: Will remain free from falls  Description: Will remain free from falls  Outcome: Ongoing  Note: No falls thsi shift  Goal: Absence of physical injury  Description: Absence of physical injury  Note: No physical injury this shift     Problem: Urinary Elimination:  Goal: Complications related to the disease process, condition or treatment will be avoided or minimized  Outcome: Ongoing     Problem: Serum Glucose Level - Abnormal:  Goal: Ability to maintain appropriate glucose levels will improve  Description: Ability to maintain appropriate glucose levels will improve  Outcome: Ongoing  Note: Blood glucose can be high at times     Problem: Infection:  Goal: Will remain free from infection  Description: Will remain free from infection  Outcome: Ongoing  Note: No symptoms of infection     Problem: Safety:  Goal: Free from accidental physical injury  Description: Free from accidental physical injury  Outcome: Ongoing  Goal: Free from intentional harm  Description: Free from intentional harm  Outcome: Ongoing     Problem: Cardiovascular  Goal: No DVT, peripheral vascular complications  Outcome: Ongoing  Note: No dvt complications     Problem: Respiratory  Goal: O2 Sat > 90%  Outcome: Ongoing  Note: O2 sat are in low 90's pateint uses her INcentive  spirometer every hour. She has taken a walk. In the valentine.

## 2020-10-11 NOTE — PROGRESS NOTES
Pt was calm and cooperative t/o shift. Her daughter spent the night. She denied pain. She had no c/o pain or n/v. She had very good urine o/p. The edema in her upper extremities is much improved from the night before. She ambulated in the valentine with a walker for approx imately 200 feet.  He surgical incisions are C,D,I.

## 2020-10-11 NOTE — PROGRESS NOTES
Updated Alison RIVERA of patient condition and new orders from nephrology. She recommends to keep patient this evening and possibly discharge tomorrow. Updated patient.

## 2020-10-12 ENCOUNTER — TELEPHONE (OUTPATIENT)
Dept: UROLOGY | Age: 75
End: 2020-10-12

## 2020-10-12 VITALS
TEMPERATURE: 98.6 F | HEART RATE: 86 BPM | SYSTOLIC BLOOD PRESSURE: 149 MMHG | OXYGEN SATURATION: 93 % | BODY MASS INDEX: 27.77 KG/M2 | RESPIRATION RATE: 18 BRPM | DIASTOLIC BLOOD PRESSURE: 67 MMHG | WEIGHT: 147.06 LBS | HEIGHT: 61 IN

## 2020-10-12 LAB
ANION GAP SERPL CALCULATED.3IONS-SCNC: 12 MEQ/L (ref 8–16)
BASOPHILS # BLD: 0.3 %
BASOPHILS ABSOLUTE: 0 THOU/MM3 (ref 0–0.1)
BUN BLDV-MCNC: 23 MG/DL (ref 7–22)
CALCIUM SERPL-MCNC: 8.6 MG/DL (ref 8.5–10.5)
CHLORIDE BLD-SCNC: 104 MEQ/L (ref 98–111)
CO2: 23 MEQ/L (ref 23–33)
CREAT SERPL-MCNC: 1.6 MG/DL (ref 0.4–1.2)
EOSINOPHIL # BLD: 3.6 %
EOSINOPHILS ABSOLUTE: 0.3 THOU/MM3 (ref 0–0.4)
ERYTHROCYTE [DISTWIDTH] IN BLOOD BY AUTOMATED COUNT: 12.4 % (ref 11.5–14.5)
ERYTHROCYTE [DISTWIDTH] IN BLOOD BY AUTOMATED COUNT: 41.9 FL (ref 35–45)
GFR SERPL CREATININE-BSD FRML MDRD: 31 ML/MIN/1.73M2
GLUCOSE BLD-MCNC: 196 MG/DL (ref 70–108)
GLUCOSE BLD-MCNC: 211 MG/DL (ref 70–108)
GLUCOSE BLD-MCNC: 212 MG/DL (ref 70–108)
HCT VFR BLD CALC: 28.7 % (ref 37–47)
HEMOGLOBIN: 9.2 GM/DL (ref 12–16)
IMMATURE GRANS (ABS): 0.05 THOU/MM3 (ref 0–0.07)
IMMATURE GRANULOCYTES: 0.6 %
LYMPHOCYTES # BLD: 7.7 %
LYMPHOCYTES ABSOLUTE: 0.7 THOU/MM3 (ref 1–4.8)
MCH RBC QN AUTO: 29.4 PG (ref 26–33)
MCHC RBC AUTO-ENTMCNC: 32.1 GM/DL (ref 32.2–35.5)
MCV RBC AUTO: 91.7 FL (ref 81–99)
MONOCYTES # BLD: 7.5 %
MONOCYTES ABSOLUTE: 0.7 THOU/MM3 (ref 0.4–1.3)
NUCLEATED RED BLOOD CELLS: 0 /100 WBC
PLATELET # BLD: 195 THOU/MM3 (ref 130–400)
PMV BLD AUTO: 9.9 FL (ref 9.4–12.4)
POTASSIUM SERPL-SCNC: 3.1 MEQ/L (ref 3.5–5.2)
RBC # BLD: 3.13 MILL/MM3 (ref 4.2–5.4)
SEG NEUTROPHILS: 80.3 %
SEGMENTED NEUTROPHILS ABSOLUTE COUNT: 7 THOU/MM3 (ref 1.8–7.7)
SODIUM BLD-SCNC: 139 MEQ/L (ref 135–145)
WBC # BLD: 8.7 THOU/MM3 (ref 4.8–10.8)

## 2020-10-12 PROCEDURE — 80048 BASIC METABOLIC PNL TOTAL CA: CPT

## 2020-10-12 PROCEDURE — 6370000000 HC RX 637 (ALT 250 FOR IP): Performed by: PHYSICIAN ASSISTANT

## 2020-10-12 PROCEDURE — 97162 PT EVAL MOD COMPLEX 30 MIN: CPT

## 2020-10-12 PROCEDURE — 99232 SBSQ HOSP IP/OBS MODERATE 35: CPT | Performed by: INTERNAL MEDICINE

## 2020-10-12 PROCEDURE — 36415 COLL VENOUS BLD VENIPUNCTURE: CPT

## 2020-10-12 PROCEDURE — 2580000003 HC RX 258: Performed by: PHYSICIAN ASSISTANT

## 2020-10-12 PROCEDURE — 99024 POSTOP FOLLOW-UP VISIT: CPT | Performed by: NURSE PRACTITIONER

## 2020-10-12 PROCEDURE — 6370000000 HC RX 637 (ALT 250 FOR IP): Performed by: NURSE PRACTITIONER

## 2020-10-12 PROCEDURE — 6360000002 HC RX W HCPCS: Performed by: PHYSICIAN ASSISTANT

## 2020-10-12 PROCEDURE — 6370000000 HC RX 637 (ALT 250 FOR IP): Performed by: INTERNAL MEDICINE

## 2020-10-12 PROCEDURE — 99024 POSTOP FOLLOW-UP VISIT: CPT | Performed by: PHYSICIAN ASSISTANT

## 2020-10-12 PROCEDURE — 85025 COMPLETE CBC W/AUTO DIFF WBC: CPT

## 2020-10-12 PROCEDURE — 6360000002 HC RX W HCPCS: Performed by: NURSE PRACTITIONER

## 2020-10-12 PROCEDURE — 82948 REAGENT STRIP/BLOOD GLUCOSE: CPT

## 2020-10-12 PROCEDURE — 97116 GAIT TRAINING THERAPY: CPT

## 2020-10-12 RX ORDER — LEVOFLOXACIN 750 MG/1
750 TABLET ORAL EVERY OTHER DAY
Qty: 5 TABLET | Refills: 0 | Status: SHIPPED | OUTPATIENT
Start: 2020-10-12 | End: 2020-10-22

## 2020-10-12 RX ORDER — DOCUSATE SODIUM 100 MG/1
100 CAPSULE, LIQUID FILLED ORAL DAILY PRN
Qty: 30 CAPSULE | Refills: 0 | Status: SHIPPED | OUTPATIENT
Start: 2020-10-12 | End: 2021-01-01

## 2020-10-12 RX ORDER — HYDROCODONE BITARTRATE AND ACETAMINOPHEN 5; 325 MG/1; MG/1
1 TABLET ORAL EVERY 6 HOURS PRN
Qty: 12 TABLET | Refills: 0 | Status: SHIPPED | OUTPATIENT
Start: 2020-10-12 | End: 2020-10-15

## 2020-10-12 RX ORDER — POTASSIUM CHLORIDE 7.45 MG/ML
10 INJECTION INTRAVENOUS PRN
Status: DISCONTINUED | OUTPATIENT
Start: 2020-10-12 | End: 2020-10-12 | Stop reason: HOSPADM

## 2020-10-12 RX ORDER — CARVEDILOL 3.12 MG/1
3.12 TABLET ORAL 2 TIMES DAILY
Status: DISCONTINUED | OUTPATIENT
Start: 2020-10-12 | End: 2020-10-12 | Stop reason: HOSPADM

## 2020-10-12 RX ORDER — CARVEDILOL 3.12 MG/1
3.12 TABLET ORAL 2 TIMES DAILY
Qty: 60 TABLET | Refills: 1 | Status: SHIPPED | OUTPATIENT
Start: 2020-10-12 | End: 2020-11-11

## 2020-10-12 RX ORDER — POTASSIUM CHLORIDE 20 MEQ/1
40 TABLET, EXTENDED RELEASE ORAL PRN
Status: DISCONTINUED | OUTPATIENT
Start: 2020-10-12 | End: 2020-10-12 | Stop reason: HOSPADM

## 2020-10-12 RX ADMIN — Medication 10 ML: at 08:38

## 2020-10-12 RX ADMIN — GLIPIZIDE 10 MG: 10 TABLET ORAL at 13:48

## 2020-10-12 RX ADMIN — DOCUSATE SODIUM 50 MG AND SENNOSIDES 8.6 MG 1 TABLET: 8.6; 5 TABLET, FILM COATED ORAL at 08:37

## 2020-10-12 RX ADMIN — HYDRALAZINE HYDROCHLORIDE 10 MG: 20 INJECTION, SOLUTION INTRAMUSCULAR; INTRAVENOUS at 04:41

## 2020-10-12 RX ADMIN — LEVOFLOXACIN 750 MG: 750 TABLET, FILM COATED ORAL at 08:37

## 2020-10-12 RX ADMIN — INSULIN LISPRO 4 UNITS: 100 INJECTION, SOLUTION INTRAVENOUS; SUBCUTANEOUS at 12:38

## 2020-10-12 RX ADMIN — POTASSIUM CHLORIDE 40 MEQ: 1500 TABLET, EXTENDED RELEASE ORAL at 11:00

## 2020-10-12 RX ADMIN — INSULIN LISPRO 4 UNITS: 100 INJECTION, SOLUTION INTRAVENOUS; SUBCUTANEOUS at 08:39

## 2020-10-12 RX ADMIN — Medication 10 ML: at 04:41

## 2020-10-12 RX ADMIN — AMLODIPINE BESYLATE 10 MG: 10 TABLET ORAL at 08:38

## 2020-10-12 RX ADMIN — HEPARIN SODIUM 5000 UNITS: 5000 INJECTION INTRAVENOUS; SUBCUTANEOUS at 07:31

## 2020-10-12 RX ADMIN — HEPARIN SODIUM 5000 UNITS: 5000 INJECTION INTRAVENOUS; SUBCUTANEOUS at 13:44

## 2020-10-12 RX ADMIN — TIMOLOL MALEATE 1 DROP: 5 SOLUTION/ DROPS OPHTHALMIC at 08:38

## 2020-10-12 RX ADMIN — GLIPIZIDE 10 MG: 10 TABLET ORAL at 08:37

## 2020-10-12 RX ADMIN — CARVEDILOL 3.12 MG: 3.12 TABLET, FILM COATED ORAL at 12:37

## 2020-10-12 ASSESSMENT — PAIN SCALES - GENERAL
PAINLEVEL_OUTOF10: 0

## 2020-10-12 NOTE — TELEPHONE ENCOUNTER
Patient is status post Robotic Laparascopic assisted left side partial nephrectomy  Left renal cortical biopsy on 10/8/20. Scheduled for discharge today. Will call office to arrange follow-up appointment with Dr. Aida Snyder.

## 2020-10-12 NOTE — PROGRESS NOTES
Physician Progress Note      PATIENT:               Sherree Councilman  CSN #:                  [id number]  :                       1945  ADMIT DATE:       10/8/2020 6:08 AM  DISCH DATE:  RESPONDING  PROVIDER #:        Sumter Economy APRN - CNP          QUERY TEXT:    Dr Eugenia Verdugo, Jefry Pollack,    Pt admitted with Renal Mass. Pre surgery patient SPO2  97% RA, Post extubation   pt required Simple Mask titrating to 3LNC- 2LNC- RA, then requiring 2LNC   continuous to maintain SPO2> 93%. Please document in progress notes and   discharge summary the cause and correlating clinical indicators to support   such: The medical record reflects the following:  Risk Factors: DM, PONV, HTN  Clinical Indicators: following  Extubation patient required Simple   Mask-titrating to [facility]- 2LNC-RA. Pt then required \[facility]\"" - [facility] continuously to   maintain SPO2 >93%. Treatment: 6454-5036 Simple Mask SPO2 100% Res 15, 8475-3635 3LNC R 17 SPO2   100%, 8156-0689 2LNC SPO2 100% R 14, 4288-3730 RA SPO2 99% R16, 1513 2LNC   continuous R 16-20, SPO2 94%  Options provided:  -- Acute Postoperative Pulmonary Insufficiency  -- Prolonged emergence from general anesthesia  -- Continuous 02 for comfort on non-02 dependent patient  -- Other - I will add my own diagnosis  -- Disagree - Not applicable / Not valid  -- Disagree - Clinically unable to determine / Unknown  -- Refer to Clinical Documentation Reviewer    PROVIDER RESPONSE TEXT:    Patient has acute postoperative pulmonary insufficiency.     Query created by: Shweta Flores on 10/9/2020 7:05 AM      Electronically signed by:  Que Burns CNP 10/12/2020 11:45 AM

## 2020-10-12 NOTE — PROGRESS NOTES
[facility]  INPATIENT PHYSICAL THERAPY  EVALUATION  [facility] ONC MED 5K - 5K-05/005-A    Time In: 08  Time Out: 825  Timed Code Treatment Minutes: 10 Minutes  Minutes: 20          Date: 10/12/2020  Patient Name: Duane Carton,  Gender:  female        MRN: [id number]  : 1945  (67 y.o.)      Referring Practitioner: Martha Wallace PA-C  Diagnosis: left renal mass  Additional Pertinent Hx: Pt is s/p: \"Robotic Laparascopic assisted left side partial nephrectomy\" on 10/8/2020. Restrictions/Precautions:  Restrictions/Precautions: Fall Risk    Subjective:  Chart Reviewed: Yes  Patient assessed for rehabilitation services?: Yes  Family / Caregiver Present: Yes  Subjective: RN approved PT evaluation. Pt in supine upon entry and was agreeable to PT interventions. Pt is very pleasant to work with and she is very eager to d/c home. Pt will benefit from skilled PT services during admission and post d/c for improved functional I and safety with mobility. General:  Overall Orientation Status: Within Functional Limits  Follows Commands: Within Functional Limits    Vision: Within Functional Limits    Hearing: Within functional limits         Pain: denies pain    Social/Functional History:    Lives With: Alone  Type of Home: House  Home Access: Stairs to enter with rails  Entrance Stairs - Number of Steps: 3  Entrance Stairs - Rails: Right  Home Equipment: [employer]     Ambulation Assistance: Independent  Transfer Assistance: Independent    Active : Yes     Additional Comments: Pt reports I prior to admission without AD.     OBJECTIVE:  Range of Motion:  Bilateral Lower Extremity: WFL    Strength:  Bilateral Lower Extremity: WFL    Balance:  Static Standing Balance: Stand By Assistance, Contact Guard Assistance  Dynamic Standing Balance: Stand By Assistance, Contact Guard Assistance    Bed Mobility:  Rolling to Right: Stand By Assistance, with increased time for completion   Supine to Sit: Stand By Assistance, with verbal cues , with increased time for completion    Transfers:  Sit to Stand: Stand By Assistance, [employer], with increased time for completion, cues for hand placement, to/from chair with arms  Stand to Sit:Stand By Assistance, with increased time for completion, cues for hand placement, to/from chair with arms    Ambulation:  Stand By Assistance, [employer], with cues for safety, with verbal cues , with increased time for completion  Distance: 50ft with 2WW - SBA to supervision A, without AD for 200ft with contact guard to stand-by assist, 2LOB noted to the right - pt was able to self correct  Surface: Level Tile  Device:Rolling Walker, no AD  Gait Deviations: Forward Flexed Posture, Slow Alba and mild path deviation, 2 LOB noted to the right   Discussed using 2WW at home upon d/c especially since she is home alone for the first 1-2 weeks for safety. Stairs:  Stand By Assistance, [employer] Assistance  Number of Steps: 3  Height: 6\" step with One Handrail    Exercise:  Patient was guided in 1 set(s) 15 reps of exercise to both lower extremities. Ankle pumps, Heelslides, Hip abduction/adduction and Straight leg raises. Exercises were completed for increased independence with functional mobility. Functional Outcome Measures: Completed  AM-PAC Inpatient Mobility without Stair Climbing Raw Score : 15  AM-PAC Inpatient without Stair Climbing T-Scale Score : 43.03    ASSESSMENT:  Activity Tolerance:  Patient tolerance of  treatment: good. Pt tolerated session well, no major LOB noted. No SOB or increased pain reported. Pt tolerated gait very well this date. Treatment Initiated: Treatment and education initiated within context of evaluation.   Evaluation time included review of current medical information, gathering information related to past medical, social and functional history, completion of standardized testing, formal and informal observation of tasks, assessment of data and development of plan of care and goals. Treatment time included skilled education and facilitation of tasks to increase safety and independence with functional mobility for improved independence and quality of life. Pt education provided regarding the importance of mobility during admission. Pt encouraged to ambulate 3x/day with staff and sit in bedside chair for all meals. HEP instructed. Assessment: Body structures, Functions, Activity limitations: Decreased functional mobility , Decreased strength, Decreased safe awareness, Decreased endurance, Increased pain, Decreased balance, Decreased posture  Assessment: Pt demonstrates a decrease in baseline by way of transfers, bed mobility and ambulation and will benefit from skilled PT services during admission and post d/c for improved functional I and safety with mobility. Pt is s/p left partial nephrectomy on 10/8.   Prognosis: Good    REQUIRES PT FOLLOW UP: Yes    Discharge Recommendations:  Discharge Recommendations: Home with assist PRN    Patient Education:  PT Education: Goals, PT Role, Plan of Care    Equipment Recommendations:  Equipment Needed: Yes  Mobility Devices: Reed Hair: Rolling    Plan:  Times per week: 5xGM  Current Treatment Recommendations: Strengthening, Neuromuscular Re-education, Home Exercise Program, Balance Training, Endurance Training, Functional Mobility Training, Patient/Caregiver Education & Training, Safety Education & Training, Transfer Training, Gait Training, Stair training, Equipment Evaluation, Education, & procurement    Goals:  Patient goals : go home  Short term goals  Time Frame for Short term goals: by discharge  Short term goal 1: bed mobility with supervision A for ease of getting in/out of bed  Short term goal 2: sit <> stand with I for ease of transfers  Short term goal 3: ambulate > 300ft without AD, no LOB to prepare for PLOF  Short term goal 4: 3 steps with right hand rail and supervision A for ease of getting in/out of her home  Long term goals  Time Frame for Long term goals : N/A secondary to short ELOS    Following session, patient left in safe position with all fall risk precautions in place.

## 2020-10-12 NOTE — PROGRESS NOTES
Patient is alert and oriented. Heart sounds are clear and regular with no murmurs noted. Lung sounds are clear anteriorly and posteriorly bilaterally. Breaths are deep easy and unlabored. Left and right radial pulses 2+ bilaterally. Left and right pedal pulses 2+ bilaterally. Left and right posttibial pulses are 1+ bilaterally. 4 surgical incision sites on abdomen well approximated with surgical glue, open to air, clean dry and intact.

## 2020-10-12 NOTE — PROGRESS NOTES
Urology Progress Note    Chief Complaint: Left renal mass    POD # 4 Robotic laparoscopic assisted left side partial nephrectomy and left renal cortical biopsy by Dr. Carolynn Linares 10/8/2020. Subjective:     Pt resting in chair on room air. Has ambulated in the halls already this morning. Pt denies SOB or chest pain. No significant abdominal or flank pain. Some tenderness with activity. +bm post op. Tolerating diet without n/v. Ambulated in hallway 2 x yesterday. Using incentive spirometer. Notes some \"numbness\" to left upper leg last night that improved with changing position. No focal neuro deficit on exam this am.  No numbness today. No calf pain. Vitals:  BP (!) 149/67   Pulse 86   Temp 98.6 °F (37 °C) (Oral)   Resp 18   Ht 5' 1\" (1.549 m)   Wt 147 lb 1 oz (66.7 kg)   SpO2 93%   BMI 27.79 kg/m²   Temp  Av.1 °F (37.3 °C)  Min: 98.6 °F (37 °C)  Max: 99.6 °F (37.6 °C)    Intake/Output Summary (Last 24 hours) at 10/12/2020 1634  Last data filed at 10/12/2020 1313  Gross per 24 hour   Intake 1795 ml   Output 2645 ml   Net -850 ml       Social History     Socioeconomic History    Marital status:       Spouse name: Not on file    Number of children: Not on file    Years of education: Not on file    Highest education level: Not on file   Occupational History     Comment: retired   Social Needs    Financial resource strain: Not on file    Food insecurity     Worry: Not on file     Inability: Not on file   [employer] needs     Medical: Not on file     Non-medical: Not on file   Tobacco Use    Smoking status: Never Smoker    Smokeless tobacco: Never Used   Substance and Sexual Activity    Alcohol use: No    Drug use: No    Sexual activity: Not on file   Lifestyle    Physical activity     Days per week: Not on file     Minutes per session: Not on file    Stress: Not on file   Relationships    Social connections     Talks on phone: Not on file     Gets together: Not on file     Attends Tenriism service: Not on file     Active member of club or organization: Not on file     Attends meetings of clubs or organizations: Not on file     Relationship status: Not on file    Intimate partner violence     Fear of current or ex partner: Not on file     Emotionally abused: Not on file     Physically abused: Not on file     Forced sexual activity: Not on file   Other Topics Concern    Not on file   Social History Narrative    Not on file     Family History   Problem Relation Age of Onset    Cancer Mother     Heart Disease Father     Diabetes Sister     Diabetes Brother      Allergies   Allergen Reactions    Relafen [Nabumetone] Nausea Only    Sulfa Antibiotics Nausea Only         Constitutional: Alert and oriented times x3, no acute distress, and cooperative to examination with appropriate mood and affect. HEENT:   Head:         Normocephalic and atraumatic. Mucous membranes are normal.   Eyes:         EOM are normal. No scleral icterus. Nose:    The external appearance of the nose is normal  Ears: The ears appear normal to external inspection. Cardiovascular:       Normal rate, regular rhythm. Pulmonary/Chest:  Normal respiratory rate and rhthym. No use of accessory muscles. On room air  Abdominal:          Soft. Expected incisional tenderness. No guarding or rebound Active bowel sounds. Incisions with edges well approximated. No drainage or erythema  Musculoskeletal:    Normal range of motion. Exhibits no edema or tenderness of lower extremities. Extremities:    No cyanosis, clubbing, or edema present. Neurological:    Alert and oriented.      Labs:  WBC:    Lab Results   Component Value Date    WBC 8.7 10/12/2020     Hemoglobin/Hematocrit:    Lab Results   Component Value Date    HGB 9.2 10/12/2020    HCT 28.7 10/12/2020     BMP:    Lab Results   Component Value Date     10/12/2020    K 3.1 10/12/2020    K 4.0 10/09/2020     10/12/2020    CO2 23 10/12/2020    BUN 23 10/12/2020    LABALBU 4.2 07/09/2020    CREATININE 1.6 10/12/2020    CALCIUM 8.6 10/12/2020    LABGLOM 31 10/12/2020       Impression/plan:  1. Left renal mass--s/p RAL partial nephrectomy 10/8  2. Pneumonia--CXR with patchy LLL infiltrates--Levaquin 750 mg q48 hours for 7 days  3. Bilateral small pleural effusions--s/p lasix 20 mg IV x 1 10/10/2020. Had 1300 mls out following dose. Further diuretic dosing per nephrology recs  4. CKD III/Nephrotic range proteinuria--creatinine trending down to 1.6  5. Essential HTN, uncontrolled--lisinopril restarted. 6. DM type II--glucose monitoring ac and hs with sliding scale--resume glipizide now--resume Januvia when ok with nephrology. Appreciate nephrology assistance. Further diuretics per their recs. Lisinopril resumed. Advance activity. Continue to encourage incentive spirometer every 1 hour while awake. Up to chair TID and with meals and ambulate in hallway at least TID. Pt, RN, and daughter aware and agreeable. O2 off today. Discussed with Dr Vermell Hatchet, ok for discharge. Needs to do IS at home. Ambulate. No heavy lifting. Drink 2x what patient would normally drink, and cut portion size in half for next 1-2 weeks.     KEHINDE Archer-CNP  10/12/20 4:34 PM  Urology

## 2020-10-12 NOTE — PLAN OF CARE
Problem: Pain:  Goal: Pain level will decrease  Description: Pain level will decrease  Outcome: Ongoing  Note: Pt denies pain this shift. Problem: Falls - Risk of:  Goal: Will remain free from falls  Description: Will remain free from falls  Outcome: Ongoing  Note: Pt free from falls this shift, non skid socks on when up, ambulates with steady gait, use of walker  when up, walker and GB when in halls, uses call light for assistance, bed alarm zone 2, A&Ox4. Problem: Urinary Elimination:  Goal: Complications related to the disease process, condition or treatment will be avoided or minimized  Outcome: Ongoing  Note: 1300 ml clear, yellow urine out this shift. Problem: Serum Glucose Level - Abnormal:  Goal: Ability to maintain appropriate glucose levels will improve  Description: Ability to maintain appropriate glucose levels will improve  Outcome: Ongoing  Note: BG 191this shift,     Problem: Skin Integrity:  Goal: Skin integrity will stabilize  Description: Skin integrity will stabilize  Outcome: Ongoing  Note: X5 surgical lap sites with surgical glue, open to air. Problem: Discharge Planning:  Goal: Patients continuum of care needs are met  Description: Patients continuum of care needs are met  Note: Pt plans to return home alone at discharge with support from daughter who lives nearby. Care plan reviewed with patient and daughter. Patient and daughter verbalize understanding of the plan of care and contribute to goal setting.

## 2020-10-12 NOTE — PROGRESS NOTES
Patient is alert and oriented times 3. Patient's mood is appropriate. Speech is clear and articulate, no slurring noted. No tongue deviation or mouth droop present. Hand grasp is strong bilaterally, pedal push and pull is strong and equal bilaterally. Eyes are symmetric and even, move in all 6 cardinal fields. Ears are symmetric and hearing is intact. Sclera is white and conjunctiva is pink bilaterally. PERRLA. Nose is symmetric and midline, nares patent bilaterally. Facial expressions are symmetric. Lips are smooth and pink with no cracks, oral mucosa is pink and moist, tongue is pink and moist and FROM. Neck is midline with the body, no carotid bruit, no dysphagia, thyroid gland is soft no nodules palpated. Skin is pink warm and dry, no edema noted, skin turgor is instantaneous in all extremities, capillary refill is less than 3 seconds in hands and feet bilaterally. 4 Surgical incision sites on abdomen are well approximated, with surgical glue open to air, dry and intact. Left radial, right radial, left pedal, and right pedal are 2+. Left and right post tibial pulses are 1+. FROM of upper and lower extremities bilaterally. Gait is steady and symmetrical. Legs are symmetric with the ability to gorw hair present bilaterally. Heart sounds are regular and clear no murmurs noted. Breathing is deep easy and unlabored. Lung sounds are clear anteriorly and posteriorly bilaterally. Abdomen is soft and firm, no pain upon palpation, bowel sounds are active in all 4 quadrants. Capped IV in right wrist is clean dry and intact.

## 2020-10-13 ENCOUNTER — TELEPHONE (OUTPATIENT)
Dept: FAMILY MEDICINE CLINIC | Age: 75
End: 2020-10-13

## 2020-10-13 LAB — MISC REFERENCE: NORMAL

## 2020-10-13 NOTE — TELEPHONE ENCOUNTER
Jessy [facility] Transitions Initial Follow Up Call    Outreach made within 2 business days of discharge: Yes    Patient: Eldon Be Patient : 1945   MRN: [id number]  Reason for Admission: There are no discharge diagnoses documented for the most recent discharge. Discharge Date: 10/12/20       Spoke with: Patricio Rosas    Discharge department/facility: [facility]    TCM Interactive Patient Contact:  Was patient able to fill all prescriptions: Yes  Was patient instructed to bring all medications to the follow-up visit: Yes  Is patient taking all medications as directed in the discharge summary?  Yes  Does patient understand their discharge instructions: Yes  Does patient have questions or concerns that need addressed prior to 7-14 day follow up office visit: no    Scheduled appointment with PCP within 7-14 days    Follow Up  Future Appointments   Date Time Provider Ray Parrish   10/29/2020  2:45 PM MD KULDEEP [facility] [place] [facility] [place]   12/3/2020 11:00 AM Sharon Barajas MD [facility] [place]   2020  1:20 PM Vannessa Zheng DO [facility] [place]   2/15/2021  9:40 AM Tamika Pratt DO [facility] [place]   2021  1:20 PM Vannessa Zheng DO [facility] [place] [address], CMA ([address] [place] [address])

## 2020-10-13 NOTE — CARE COORDINATION
Late entry, patient discharged to home last evening with no services added/requested. 10/13/20, 7:27 AM EDT    Patient goals/plan/ treatment preferences discussed by  and . Patient goals/plan/ treatment preferences reviewed with patient/ family. Patient/ family verbalize understanding of discharge plan and are in agreement with goal/plan/treatment preferences. Understanding was demonstrated using the teach back method. AVS provided by RN at time of discharge, which includes all necessary medical information pertaining to the patients current course of illness, treatment, post-discharge goals of care, and treatment preferences.         IMM Letter  IMM Letter given to Patient/Family/Significant other/Guardian/POA/by[de-identified]   IMM Letter date given[de-identified] 10/09/20  IMM Letter time given[de-identified] 8202

## 2020-10-14 ENCOUNTER — TELEPHONE (OUTPATIENT)
Dept: NEPHROLOGY | Age: 75
End: 2020-10-14

## 2020-10-14 NOTE — TELEPHONE ENCOUNTER
BMP, UA and urine protein-creatinine ratio in 2 weeks    Schedule appt in 4 weeks.     ----- Message -----    From: Maria Teresa Hughes    Sent: 10/13/2020   2:44 PM EDT    To: Bibi Rangel MD    Subject: Scan                                               The image below was scanned by Maria Teresa Hughes [[email or web address]] on 10/13/2020 at 2:44 PM to the following: Gilbert Miranda [[id number]]:

## 2020-10-14 NOTE — TELEPHONE ENCOUNTER
I called and moved pt's jesse up to 11/11/2020. She will use her blood work for her December appt and get this done at [facility].

## 2020-10-17 LAB
ABSOLUTE BASO #: 0 X10E9/L (ref 0–0.2)
ABSOLUTE EOS #: 0.5 X10E9/L (ref 0–0.4)
ABSOLUTE LYMPH #: 0.5 X10E9/L (ref 1–3.5)
ABSOLUTE MONO #: 0.6 X10E9/L (ref 0–0.9)
ABSOLUTE NEUT #: 6.2 X10E9/L (ref 1.5–6.6)
ANION GAP SERPL CALCULATED.3IONS-SCNC: 8 MMOL/L (ref 5–15)
BASOPHILS RELATIVE PERCENT: 0.5 %
BUN BLDV-MCNC: 18 MG/DL (ref 5–27)
CALCIUM SERPL-MCNC: 8.9 MG/DL (ref 8.5–10.5)
CHLORIDE BLD-SCNC: 104 MMOL/L (ref 98–109)
CO2: 27 MMOL/L (ref 22–32)
CREAT SERPL-MCNC: 1.51 MG/DL (ref 0.4–1)
EGFR AFRICAN AMERICAN: 41 ML/MIN/1.73SQ.M
EGFR IF NONAFRICAN AMERICAN: 34 ML/MIN/1.73SQ.M
EOSINOPHILS RELATIVE PERCENT: 6.6 %
GLUCOSE: 201 MG/DL (ref 65–99)
HCT VFR BLD CALC: 28.3 % (ref 35–47)
HEMOGLOBIN: 9.5 G/DL (ref 11.7–15.5)
LYMPHOCYTE %: 6.6 %
MCH RBC QN AUTO: 29.6 PG (ref 27–34)
MCHC RBC AUTO-ENTMCNC: 33.6 G/DL (ref 32–36)
MCV RBC AUTO: 88 FL (ref 80–100)
MONOCYTES # BLD: 8.1 %
NEUTROPHILS RELATIVE PERCENT: 78.2 %
PDW BLD-RTO: 13.1 % (ref 11.5–15)
PLATELETS: 310 X10E9/L (ref 150–450)
PMV BLD AUTO: 7.6 FL (ref 7–12)
POTASSIUM SERPL-SCNC: 4 MMOL/L (ref 3.5–5)
RBC: 3.21 X10E12/L (ref 3.8–5.2)
SODIUM BLD-SCNC: 139 MMOL/L (ref 134–146)
WBC: 7.9 X10E9/L (ref 4–11)

## 2020-10-20 NOTE — DISCHARGE SUMMARY
DISCHARGE SUMMARY NOTE:      Patient Identification  Emigdio Cuevas is a 67 y.o. female. :  1945  Admit Date:  10/8/2020    Discharge date:   10/12/2020  5:04 PM                                   Disposition: Home    Discharge Diagnoses:   Patient Active Problem List   Diagnosis    Hyperlipidemia    Benign essential HTN    Left renal mass    Stage 3b chronic kidney disease    H/O partial nephrectomy    Metabolic acidosis         Consults: Nephrology    Surgery: Robotic Assisted Laparoscopic Left Partial Nephrectomy with Left Renal Cortical Biopsy on 10/8/20 (Dr. Rayne Elizondo)    Patient Instructions: Activity: no heavy lifting, pushing, pulling for 6 weeks months, no driving for 2 weeks or while on analgesics  Diet: As tolerated  Follow-up with Dr. Ester Gutiérrez course: On first post-operative day, the patient was advancing nicely. She denies complaints, other than mild lightheadedness and weakness with ambulation. Vitals were stable, She was passing flatus and tolerating her diet. She was urinating without difficulty and denied any post-operative pain. Nephrology was consulted due to her history of CKD3 with nephrotic range proteinuria. On post-operative day #2, the patient started spiking low grade temperatures and was placed on 2 L O2. A CXR was obtained, significant for pneumonia and small bilateral pleural effusions. She was placed on Levaquin 750 mg q48 hours. She was given Lasix 20 mg IV x 1 dose for her small bilateral pleural effusions. On post-operative Day #4, she was feeling well with stable vitals and labs. She was discharged to home with prescriptions for Norco, Levaquin, and Colace.

## 2020-10-20 NOTE — PROGRESS NOTES
Physician Progress Note      PATIENT:               Rosi Garcia  CSN #:                  [id number]  :                       1945  ADMIT DATE:       10/8/2020 6:08 AM  DISCH DATE:        10/12/2020 5:04 PM  RESPONDING  PROVIDER #:        Radha Burns CNP          QUERY TEXT:    Dr Amador,    Patient admitted with left side partial nephrectomy and left renal cortical   biopsy by Dr. Wilder Bolaños 10/8/2020. Pathology revealed: Clear-cell renal cell   carcinoma, Grade 1. Parenchymal margin focally positive for tumor. If   possible, please document in progress notes and discharge summary if you are   evaluating and/or treating any of the following: The medical record reflects the following:  Risk Factors: left renal mass  Clinical Indicators:  10/12/20 Left renal mass pathology: Clear-cell renal   cell carcinoma, Grade 1. ?Parenchymal margin focally positive for tumor  Treatment: left side partial nephrectomy and left renal cortical biopsy by Dr. Wilder Bolaños 10/8/2020. Options provided:  -- Malignant neoplasm of left kidney  -- No Malignant neoplasm of left kidney. -- Other - I will add my own diagnosis  -- Disagree - Not applicable / Not valid  -- Disagree - Clinically unable to determine / Unknown  -- Refer to Clinical Documentation Reviewer    PROVIDER RESPONSE TEXT:    This patient has Malignant neoplasm of left kidney.     Query created by: Justin Ochoa on 10/16/2020 9:11 AM      Electronically signed by:  Radha Burns CNP 10/20/2020 7:36 AM

## 2020-10-29 ENCOUNTER — TELEPHONE (OUTPATIENT)
Dept: UROLOGY | Age: 75
End: 2020-10-29

## 2020-10-29 ENCOUNTER — OFFICE VISIT (OUTPATIENT)
Dept: UROLOGY | Age: 75
End: 2020-10-29

## 2020-10-29 VITALS — BODY MASS INDEX: 27.38 KG/M2 | TEMPERATURE: 97.7 F | WEIGHT: 145 LBS | HEIGHT: 61 IN

## 2020-10-29 PROCEDURE — 99024 POSTOP FOLLOW-UP VISIT: CPT | Performed by: UROLOGY

## 2020-10-29 RX ORDER — GLIPIZIDE 10 MG/1
10 TABLET ORAL
COMMUNITY
End: 2020-11-02 | Stop reason: SDUPTHER

## 2020-10-29 RX ORDER — LISINOPRIL 20 MG/1
20 TABLET ORAL DAILY
COMMUNITY
End: 2020-11-02 | Stop reason: SDUPTHER

## 2020-10-29 NOTE — PROGRESS NOTES
 ABDOMINAL EXPLORATION SURGERY  1996    Adhesions     CHOLECYSTECTOMY  80    CORNEAL TRANSPLANT      HERNIA REPAIR  80    HYSTERECTOMY  80    INCISIONAL HERNIA REPAIR  10/28/2014    laparoscopic incisional herina repair w mesh--Dr. Catherine Phillips    LEG SURGERY Right 1962    [address] W/PLATES & REMOVED    PARTIAL NEPHRECTOMY Left 10/8/2020    ROBOTIC LEFT PARTIAL NEPHRECTOMY performed by Dex Hong MD at [place] [address]  as a child     Family History   Problem Relation Age of Onset    Cancer Mother     Heart Disease Father     Diabetes Sister     Diabetes Brother      Outpatient Medications Marked as Taking for the 10/29/20 encounter (Office Visit) with Dex Hong MD   Medication Sig Dispense Refill    glipiZIDE (GLUCOTROL) 10 MG tablet Take 10 mg by mouth 2 times daily (before meals)      lisinopril (PRINIVIL;ZESTRIL) 20 MG tablet Take 20 mg by mouth daily      lodoxamide (ALOMIDE) 0.1 % ophthalmic solution 1 drop as needed      docusate sodium (COLACE) 100 MG capsule Take 1 capsule by mouth daily as needed for Constipation 30 capsule 0    SITagliptin (JANUVIA) 100 MG tablet Take 1 tablet by mouth daily 30 tablet 2    pravastatin (PRAVACHOL) 40 MG tablet Take 1 tablet by mouth nightly 30 tablet 2    timolol (TIMOPTIC) 0.5 % ophthalmic solution 1 drop 2 times daily      amLODIPine (NORVASC) 10 MG tablet Take 10 mg by mouth daily         Relafen [nabumetone] and Sulfa antibiotics  Social History     Tobacco Use   Smoking Status Never Smoker   Smokeless Tobacco Never Used       Social History     Substance and Sexual Activity   Alcohol Use No       REVIEW OF SYSTEMS:  Constitutional: negative  Eyes: negative  Respiratory: negative  Cardiovascular: negative  Gastrointestinal: negative  Genitourinary: negative except for what is in HPI  Musculoskeletal: negative  Skin: negative   Neurological: negative  Hematological/Lymphatic: negative  Psychological: negative    Physical Exam: Vitals:    10/29/20 1453   Temp: 97.7 °F (36.5 °C)     Patient is a 67 y.o. female in no acute distress and alert and oriented to person, place and time. NAD, Alert  Non labored respiration  Normal peripheral pulses  Soft, non tender  Skin-warm and dry  Psych- normal mood and affect    Assessment and Plan      1. Renal mass    2.  Renal cell carcinoma of left kidney [facility])           Plan:      Path reviewed as noted above  Will arrange for CT surveillance in 6 months               Harrison Barthel, MD  [facility]

## 2020-10-29 NOTE — TELEPHONE ENCOUNTER
Patient see's Dr. Brooke Dodson will obtain clearance from him prior to scheduling CT scan with contrast.  CT scan is due in 6-months.

## 2020-11-02 ENCOUNTER — TELEPHONE (OUTPATIENT)
Dept: NEPHROLOGY | Age: 75
End: 2020-11-02

## 2020-11-02 RX ORDER — LISINOPRIL 20 MG/1
20 TABLET ORAL DAILY
Qty: 90 TABLET | Refills: 3 | Status: SHIPPED | OUTPATIENT
Start: 2020-11-02 | End: 2021-01-01 | Stop reason: SDUPTHER

## 2020-11-02 RX ORDER — GLIPIZIDE 10 MG/1
10 TABLET ORAL
Qty: 180 TABLET | Refills: 3 | Status: SHIPPED | OUTPATIENT
Start: 2020-11-02 | End: 2021-01-01

## 2020-11-02 NOTE — TELEPHONE ENCOUNTER
Patient called asking who cancelled her lisinopril, vit d and glipizide? Do you still want her to be taking any of these? If so she needs refills?      Please Advise

## 2020-11-09 ENCOUNTER — HOSPITAL ENCOUNTER (OUTPATIENT)
Age: 75
Discharge: HOME | End: 2020-11-09
Payer: MEDICARE

## 2020-11-09 LAB
ANION GAP SERPL CALCULATED.3IONS-SCNC: 8 MEQ/L (ref 8–16)
BUN BLDV-MCNC: 23 MG/DL (ref 7–22)
CALCIUM SERPL-MCNC: 10.3 MG/DL (ref 8.5–10.5)
CHLORIDE BLD-SCNC: 102 MEQ/L (ref 98–111)
CO2: 29 MEQ/L (ref 23–33)
CREAT SERPL-MCNC: 1.6 MG/DL (ref 0.4–1.2)
CREATININE URINE: 32.8 MG/DL
GFR SERPL CREATININE-BSD FRML MDRD: 31 ML/MIN/1.73M2
GLUCOSE BLD-MCNC: 155 MG/DL (ref 70–108)
HCT VFR BLD CALC: 35.5 % (ref 37–47)
HEMOGLOBIN: 11.1 GM/DL (ref 12–16)
PHOSPHORUS: 3.8 MG/DL (ref 2.4–4.7)
POTASSIUM SERPL-SCNC: 4.3 MEQ/L (ref 3.5–5.2)
PROT/CREAT RATIO, UR: 6.61
PROTEIN, URINE: 216.7 MG/DL
PTH INTACT: 52.6 PG/ML (ref 15–65)
SODIUM BLD-SCNC: 139 MEQ/L (ref 135–145)

## 2020-11-09 PROCEDURE — 80048 BASIC METABOLIC PNL TOTAL CA: CPT

## 2020-11-09 PROCEDURE — 84100 ASSAY OF PHOSPHORUS: CPT

## 2020-11-09 PROCEDURE — 84156 ASSAY OF PROTEIN URINE: CPT

## 2020-11-09 PROCEDURE — 85014 HEMATOCRIT: CPT

## 2020-11-09 PROCEDURE — 36415 COLL VENOUS BLD VENIPUNCTURE: CPT

## 2020-11-09 PROCEDURE — 82570 ASSAY OF URINE CREATININE: CPT

## 2020-11-09 PROCEDURE — 83970 ASSAY OF PARATHORMONE: CPT

## 2020-11-09 PROCEDURE — 85018 HEMOGLOBIN: CPT

## 2020-11-11 ENCOUNTER — OFFICE VISIT (OUTPATIENT)
Dept: NEPHROLOGY | Age: 75
End: 2020-11-11
Payer: MEDICARE

## 2020-11-11 VITALS
WEIGHT: 160 LBS | HEART RATE: 73 BPM | BODY MASS INDEX: 30.23 KG/M2 | DIASTOLIC BLOOD PRESSURE: 78 MMHG | SYSTOLIC BLOOD PRESSURE: 132 MMHG | OXYGEN SATURATION: 92 % | TEMPERATURE: 97.2 F

## 2020-11-11 PROCEDURE — 1111F DSCHRG MED/CURRENT MED MERGE: CPT | Performed by: INTERNAL MEDICINE

## 2020-11-11 PROCEDURE — 4040F PNEUMOC VAC/ADMIN/RCVD: CPT | Performed by: INTERNAL MEDICINE

## 2020-11-11 PROCEDURE — G8399 PT W/DXA RESULTS DOCUMENT: HCPCS | Performed by: INTERNAL MEDICINE

## 2020-11-11 PROCEDURE — G8484 FLU IMMUNIZE NO ADMIN: HCPCS | Performed by: INTERNAL MEDICINE

## 2020-11-11 PROCEDURE — 99214 OFFICE O/P EST MOD 30 MIN: CPT | Performed by: INTERNAL MEDICINE

## 2020-11-11 PROCEDURE — 3052F HG A1C>EQUAL 8.0%<EQUAL 9.0%: CPT | Performed by: INTERNAL MEDICINE

## 2020-11-11 PROCEDURE — 1123F ACP DISCUSS/DSCN MKR DOCD: CPT | Performed by: INTERNAL MEDICINE

## 2020-11-11 PROCEDURE — G8417 CALC BMI ABV UP PARAM F/U: HCPCS | Performed by: INTERNAL MEDICINE

## 2020-11-11 PROCEDURE — G8427 DOCREV CUR MEDS BY ELIG CLIN: HCPCS | Performed by: INTERNAL MEDICINE

## 2020-11-11 PROCEDURE — 1036F TOBACCO NON-USER: CPT | Performed by: INTERNAL MEDICINE

## 2020-11-11 PROCEDURE — 1090F PRES/ABSN URINE INCON ASSESS: CPT | Performed by: INTERNAL MEDICINE

## 2020-11-11 RX ORDER — ASPIRIN 325 MG
325 TABLET ORAL DAILY
COMMUNITY

## 2020-11-11 RX ORDER — ASCORBIC ACID 500 MG
500 TABLET ORAL DAILY
COMMUNITY

## 2020-11-11 NOTE — PROGRESS NOTES
[address] [facility]  [address]  [facility] [address]  Dept: [phone]  Loc: [phone]  Office Progress Note  11/11/2020 11:11 AM      Pt Name:    Vamshi Reynolds  YOB: 1945  Primary Care Physician:  Em Jeffrey DO     Chief Complaint:   Chief Complaint   Patient presents with    Follow-up     Proteinuria and renal mass follow-up        Background Information/Interval History:   The patient is a 67 y.o. pleasant white female with hx DM since 1996, HTN who is here for follow-up evaluation of proteinuria and renal mass. She had corneal transplant Right eye 2017. She has previously taken ibuprofen as needed for left knee pain but not regularly. Patient has history of diabetes for several years. Her sugars in the past have been in the range of 300. As part of her evaluation of proteinuria patient was noted to have incidental finding of left renal mass. She was referred to urology and underwent partial nephrectomy -pathology of which was consistent with clear-cell renal cell carcinoma. Due to nephrotic proteinuria patient was also biopsied upon my request by Dr. Maria Del Rosario Pena. Patient is now here for follow-up today. Sugars are running high upto 190-200 range. She denies any dysuria. No chest pain or shortness of breath. She is on lisinopril and norvasc. Her AIC was 9% in 2017.  Was 8.3% in Aug 2020     Past History:  Past Medical History:   Diagnosis Date    Cataracts, bilateral     Diabetes mellitus ([facility] [address]     Glaucoma     History of blood transfusion 06/1961    age 15 , s/p leg fracture surgery [facility]    Hyperlipidemia     Hypertension     Nausea & vomiting     PONV (postoperative nausea and vomiting)      Past Surgical History:   Procedure Laterality Date    ABDOMINAL EXPLORATION SURGERY  1996    Adhesions     CHOLECYSTECTOMY  85    CORNEAL TRANSPLANT      HERNIA REPAIR  90    HYSTERECTOMY  89    INCISIONAL HERNIA REPAIR  10/28/2014    laparoscopic incisional herina repair w mesh--Dr. Keshawn Yadav    LEG SURGERY Right 1962    FX W/PLATES & REMOVED    PARTIAL NEPHRECTOMY Left 10/8/2020    ROBOTIC LEFT PARTIAL NEPHRECTOMY performed by Alexia Gunter MD at [address]  as a child        VITALS:  /78 (Site: Left Upper Arm, Position: Sitting, Cuff Size: Small Adult)   Pulse 73   Temp 97.2 °F (36.2 °C)   Wt 160 lb (72.6 kg)   SpO2 92%   BMI 30.23 kg/m²   Wt Readings from Last 3 Encounters:   11/11/20 160 lb (72.6 kg)   10/29/20 145 lb (65.8 kg)   10/10/20 147 lb 1 oz (66.7 kg)     Body mass index is 30.23 kg/m². General Appearance: alert and cooperative with exam, appears comfortable, no distress  Oral: moist oral mucus membranes  Neck: No jugular venous distention  Lungs: Air entry B/L, no crackles or rales, no use of accessory muscles  Heart: S1, S2 heard  GI: soft, non-tender, no guarding  Extremities: No sig LE edema     Medications:  Current Outpatient Medications   Medication Sig Dispense Refill    aspirin 325 MG tablet Take 325 mg by mouth daily      vitamin C (ASCORBIC ACID) 500 MG tablet Take 500 mg by mouth daily      lisinopril (PRINIVIL;ZESTRIL) 20 MG tablet Take 1 tablet by mouth daily 90 tablet 3    glipiZIDE (GLUCOTROL) 10 MG tablet Take 1 tablet by mouth 2 times daily (before meals) 180 tablet 3    lodoxamide (ALOMIDE) 0.1 % ophthalmic solution 1 drop as needed      docusate sodium (COLACE) 100 MG capsule Take 1 capsule by mouth daily as needed for Constipation 30 capsule 0    SITagliptin (JANUVIA) 100 MG tablet Take 1 tablet by mouth daily 30 tablet 2    pravastatin (PRAVACHOL) 40 MG tablet Take 1 tablet by mouth nightly 30 tablet 2    timolol (TIMOPTIC) 0.5 % ophthalmic solution 1 drop 2 times daily      amLODIPine (NORVASC) 10 MG tablet Take 10 mg by mouth daily       No current facility-administered medications for this visit. Laboratory & Diagnostics:   Old progress notes from referring physician reviewed. Old labs reviewed: Oct 2014: k 4.3, Creat 0.9  June 2020: UPCR 8 grams/g, Hb AIC 7.8%  24 hr protein 5.5 grams  GOYO: speckled pattern  UA: +300 protein, no blood  JULIET/Complements/Anti GBM: all normal    HbAIC 7.8%  US: B/L renal cysts, Left renal mass 4.4 cm  July 2020: K 4.6, Creat 1.9, Ca 10.2, Albumin 4.2, 24 hr protein 5.5 grams  Aug 2020: creat 1.5, K 4.2, glucose 262, UCR 8.69 g/g, Positive GOYO    Sept 2020; K 5.1, Creat 1.7, Hb 11.6, UPCR 7.93 g/g  Nov 2020: L 4.3, Creat 1.6, UPCR 6.61 g/g     Impression/Plan:   1. Nephrotic range proteinuria: Biopsy report reviewed. Mostly Diabetic nephropathy and severe arterionephrosclerosis. 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 I discussed with patient that biopsy did show diabetic nephropathy changes but primary FSGS could not be entirely ruled out. Her AIC was 9% 3 years ago. She used to weigh over 250 lbs (patient says about 5 years ago). Clinical picture favors more diabetic nephropathy but this will need to be monitored closely. I discussed biopsy report with patient in detail. Will obtain 24-hour urine protein in 2 months  2. Left renal mass: s/p Left partial nephrectomy, path c/w Clear cell RCC  3. Positive GOYO: Will be seeing rheumatology  4. DM with proteinuria: Continue with lisinopril for now. Advised low-salt diet. Advised good sugar control to minimize risk of progression of CKD and proteinuria. 5. CKD III overall stable. 6. HTN. Advised low-salt diet. On lisinopril and Norvasc  7.  Hx Cataracts    Orders Placed This Encounter   Procedures    Basic Metabolic Panel    CBC    Urinalysis    Protein / creatinine ratio, urine    PTH, Intact    Hemoglobin A1C    Phosphorus    Protein, 24 Hr Urine     Return in about 2 months (around 1/11/2021).       Erika Jones MD  [facility]

## 2021-01-01 ENCOUNTER — HOSPITAL ENCOUNTER (OUTPATIENT)
Dept: CT IMAGING | Age: 76
Discharge: HOME | End: 2021-04-30
Payer: MEDICARE

## 2021-01-01 ENCOUNTER — TELEPHONE (OUTPATIENT)
Dept: UROLOGY | Age: 76
End: 2021-01-01

## 2021-01-01 ENCOUNTER — HOSPITAL ENCOUNTER (EMERGENCY)
Age: 76
Discharge: HOME | End: 2021-08-06
Attending: EMERGENCY MEDICINE
Payer: MEDICARE

## 2021-01-01 ENCOUNTER — APPOINTMENT (OUTPATIENT)
Dept: NUCLEAR MEDICINE | Age: 76
DRG: 871 | End: 2021-01-01
Payer: MEDICARE

## 2021-01-01 ENCOUNTER — HOSPITAL ENCOUNTER (OUTPATIENT)
Age: 76
Discharge: HOME | End: 2021-04-22
Payer: MEDICARE

## 2021-01-01 ENCOUNTER — APPOINTMENT (OUTPATIENT)
Dept: CT IMAGING | Age: 76
DRG: 871 | End: 2021-01-01
Payer: MEDICARE

## 2021-01-01 ENCOUNTER — HOSPITAL ENCOUNTER (OUTPATIENT)
Age: 76
Discharge: HOME | End: 2021-03-18
Payer: MEDICARE

## 2021-01-01 ENCOUNTER — TELEPHONE (OUTPATIENT)
Dept: RHEUMATOLOGY | Age: 76
End: 2021-01-01

## 2021-01-01 ENCOUNTER — TELEPHONE (OUTPATIENT)
Dept: FAMILY MEDICINE CLINIC | Age: 76
End: 2021-01-01

## 2021-01-01 ENCOUNTER — TELEPHONE (OUTPATIENT)
Dept: NEPHROLOGY | Age: 76
End: 2021-01-01

## 2021-01-01 ENCOUNTER — APPOINTMENT (OUTPATIENT)
Dept: GENERAL RADIOLOGY | Age: 76
DRG: 871 | End: 2021-01-01
Payer: MEDICARE

## 2021-01-01 ENCOUNTER — HOSPITAL ENCOUNTER (OUTPATIENT)
Dept: CT IMAGING | Age: 76
Discharge: HOME | End: 2021-11-03
Payer: MEDICARE

## 2021-01-01 ENCOUNTER — OFFICE VISIT (OUTPATIENT)
Dept: FAMILY MEDICINE CLINIC | Age: 76
End: 2021-01-01
Payer: MEDICARE

## 2021-01-01 ENCOUNTER — OFFICE VISIT (OUTPATIENT)
Dept: NEPHROLOGY | Age: 76
End: 2021-01-01
Payer: MEDICARE

## 2021-01-01 ENCOUNTER — HOSPITAL ENCOUNTER (INPATIENT)
Age: 76
LOS: 1 days | DRG: 871 | End: 2021-12-15
Attending: EMERGENCY MEDICINE | Admitting: SURGERY
Payer: MEDICARE

## 2021-01-01 ENCOUNTER — OFFICE VISIT (OUTPATIENT)
Dept: UROLOGY | Age: 76
End: 2021-01-01
Payer: MEDICARE

## 2021-01-01 ENCOUNTER — APPOINTMENT (OUTPATIENT)
Dept: CT IMAGING | Age: 76
End: 2021-01-01
Payer: MEDICARE

## 2021-01-01 ENCOUNTER — TELEPHONE (OUTPATIENT)
Dept: PULMONOLOGY | Age: 76
End: 2021-01-01

## 2021-01-01 ENCOUNTER — HOSPITAL ENCOUNTER (OUTPATIENT)
Dept: NURSING | Age: 76
Discharge: HOME | End: 2021-04-30
Payer: MEDICARE

## 2021-01-01 ENCOUNTER — HOSPITAL ENCOUNTER (OUTPATIENT)
Age: 76
Discharge: HOME | End: 2021-08-06
Payer: MEDICARE

## 2021-01-01 ENCOUNTER — HOSPITAL ENCOUNTER (OUTPATIENT)
Dept: ULTRASOUND IMAGING | Age: 76
Discharge: HOME | End: 2021-08-13
Payer: MEDICARE

## 2021-01-01 ENCOUNTER — HOSPITAL ENCOUNTER (OUTPATIENT)
Age: 76
Discharge: HOME | End: 2021-09-08
Payer: MEDICARE

## 2021-01-01 ENCOUNTER — HOSPITAL ENCOUNTER (OUTPATIENT)
Age: 76
Setting detail: SPECIMEN
Discharge: HOME | End: 2021-03-22
Payer: MEDICARE

## 2021-01-01 ENCOUNTER — HOSPITAL ENCOUNTER (OUTPATIENT)
Age: 76
Discharge: HOME | End: 2021-10-07
Payer: MEDICARE

## 2021-01-01 ENCOUNTER — HOSPITAL ENCOUNTER (OUTPATIENT)
Age: 76
Discharge: HOME | End: 2021-08-13
Payer: MEDICARE

## 2021-01-01 ENCOUNTER — OFFICE VISIT (OUTPATIENT)
Dept: RHEUMATOLOGY | Age: 76
End: 2021-01-01
Payer: MEDICARE

## 2021-01-01 ENCOUNTER — HOSPITAL ENCOUNTER (OUTPATIENT)
Age: 76
Discharge: HOME | End: 2021-05-17
Payer: MEDICARE

## 2021-01-01 VITALS
WEIGHT: 164 LBS | SYSTOLIC BLOOD PRESSURE: 154 MMHG | DIASTOLIC BLOOD PRESSURE: 72 MMHG | BODY MASS INDEX: 32.2 KG/M2 | HEIGHT: 60 IN

## 2021-01-01 VITALS
HEART RATE: 61 BPM | DIASTOLIC BLOOD PRESSURE: 67 MMHG | BODY MASS INDEX: 32.22 KG/M2 | TEMPERATURE: 98.2 F | OXYGEN SATURATION: 98 % | SYSTOLIC BLOOD PRESSURE: 154 MMHG | WEIGHT: 165 LBS

## 2021-01-01 VITALS
DIASTOLIC BLOOD PRESSURE: 71 MMHG | WEIGHT: 171 LBS | OXYGEN SATURATION: 96 % | BODY MASS INDEX: 33.4 KG/M2 | TEMPERATURE: 98.1 F | SYSTOLIC BLOOD PRESSURE: 167 MMHG | HEART RATE: 76 BPM

## 2021-01-01 VITALS
TEMPERATURE: 98.1 F | HEART RATE: 79 BPM | SYSTOLIC BLOOD PRESSURE: 184 MMHG | WEIGHT: 176 LBS | DIASTOLIC BLOOD PRESSURE: 79 MMHG | OXYGEN SATURATION: 96 % | BODY MASS INDEX: 34.37 KG/M2

## 2021-01-01 VITALS
TEMPERATURE: 97.5 F | DIASTOLIC BLOOD PRESSURE: 80 MMHG | OXYGEN SATURATION: 98 % | HEIGHT: 60 IN | WEIGHT: 160.6 LBS | RESPIRATION RATE: 14 BRPM | BODY MASS INDEX: 31.53 KG/M2 | SYSTOLIC BLOOD PRESSURE: 134 MMHG | HEART RATE: 66 BPM

## 2021-01-01 VITALS
WEIGHT: 150.13 LBS | TEMPERATURE: 96.8 F | BODY MASS INDEX: 29.48 KG/M2 | HEART RATE: 78 BPM | OXYGEN SATURATION: 53 % | RESPIRATION RATE: 20 BRPM | DIASTOLIC BLOOD PRESSURE: 45 MMHG | HEIGHT: 60 IN | SYSTOLIC BLOOD PRESSURE: 109 MMHG

## 2021-01-01 VITALS
TEMPERATURE: 97.7 F | OXYGEN SATURATION: 96 % | BODY MASS INDEX: 33.49 KG/M2 | SYSTOLIC BLOOD PRESSURE: 128 MMHG | HEART RATE: 64 BPM | HEIGHT: 60 IN | WEIGHT: 170.6 LBS | DIASTOLIC BLOOD PRESSURE: 72 MMHG | RESPIRATION RATE: 14 BRPM

## 2021-01-01 VITALS
OXYGEN SATURATION: 97 % | DIASTOLIC BLOOD PRESSURE: 58 MMHG | HEIGHT: 60 IN | TEMPERATURE: 97.3 F | WEIGHT: 165 LBS | SYSTOLIC BLOOD PRESSURE: 106 MMHG | BODY MASS INDEX: 32.39 KG/M2 | HEART RATE: 55 BPM | RESPIRATION RATE: 18 BRPM

## 2021-01-01 VITALS
WEIGHT: 161 LBS | DIASTOLIC BLOOD PRESSURE: 79 MMHG | RESPIRATION RATE: 18 BRPM | OXYGEN SATURATION: 98 % | HEART RATE: 71 BPM | BODY MASS INDEX: 31.44 KG/M2 | TEMPERATURE: 97.4 F | SYSTOLIC BLOOD PRESSURE: 183 MMHG

## 2021-01-01 VITALS
DIASTOLIC BLOOD PRESSURE: 62 MMHG | HEIGHT: 60 IN | HEART RATE: 69 BPM | SYSTOLIC BLOOD PRESSURE: 118 MMHG | OXYGEN SATURATION: 96 % | WEIGHT: 170.64 LBS | BODY MASS INDEX: 33.5 KG/M2

## 2021-01-01 VITALS
SYSTOLIC BLOOD PRESSURE: 148 MMHG | OXYGEN SATURATION: 97 % | BODY MASS INDEX: 31.64 KG/M2 | WEIGHT: 162 LBS | BODY MASS INDEX: 33.63 KG/M2 | WEIGHT: 178 LBS | HEART RATE: 70 BPM | TEMPERATURE: 97.1 F | DIASTOLIC BLOOD PRESSURE: 75 MMHG

## 2021-01-01 VITALS
DIASTOLIC BLOOD PRESSURE: 66 MMHG | SYSTOLIC BLOOD PRESSURE: 165 MMHG | RESPIRATION RATE: 19 BRPM | BODY MASS INDEX: 33.38 KG/M2 | WEIGHT: 170 LBS | OXYGEN SATURATION: 96 % | HEIGHT: 60 IN | HEART RATE: 79 BPM | TEMPERATURE: 98.3 F

## 2021-01-01 VITALS
WEIGHT: 167.1 LBS | BODY MASS INDEX: 31.55 KG/M2 | SYSTOLIC BLOOD PRESSURE: 180 MMHG | DIASTOLIC BLOOD PRESSURE: 70 MMHG | HEIGHT: 61 IN

## 2021-01-01 DIAGNOSIS — E11.21 DIABETIC NEPHROPATHY ASSOCIATED WITH TYPE 2 DIABETES MELLITUS (HCC): ICD-10-CM

## 2021-01-01 DIAGNOSIS — N18.32 STAGE 3B CHRONIC KIDNEY DISEASE (HCC): ICD-10-CM

## 2021-01-01 DIAGNOSIS — C64.2 RENAL CELL CARCINOMA OF LEFT KIDNEY (HCC): ICD-10-CM

## 2021-01-01 DIAGNOSIS — I10 BENIGN ESSENTIAL HTN: ICD-10-CM

## 2021-01-01 DIAGNOSIS — I10 ESSENTIAL HYPERTENSION: ICD-10-CM

## 2021-01-01 DIAGNOSIS — N18.30 STAGE 3 CHRONIC KIDNEY DISEASE, UNSPECIFIED WHETHER STAGE 3A OR 3B CKD (HCC): Primary | ICD-10-CM

## 2021-01-01 DIAGNOSIS — N18.4 CKD (CHRONIC KIDNEY DISEASE), STAGE IV (HCC): ICD-10-CM

## 2021-01-01 DIAGNOSIS — R76.8 ANA POSITIVE: ICD-10-CM

## 2021-01-01 DIAGNOSIS — N17.9 AKI (ACUTE KIDNEY INJURY) (HCC): Primary | ICD-10-CM

## 2021-01-01 DIAGNOSIS — N18.4 CKD (CHRONIC KIDNEY DISEASE), STAGE IV (HCC): Primary | ICD-10-CM

## 2021-01-01 DIAGNOSIS — Z87.39 H/O: GOUT: ICD-10-CM

## 2021-01-01 DIAGNOSIS — Z00.00 ROUTINE GENERAL MEDICAL EXAMINATION AT A HEALTH CARE FACILITY: Primary | ICD-10-CM

## 2021-01-01 DIAGNOSIS — N20.0 NEPHROLITHIASIS: ICD-10-CM

## 2021-01-01 DIAGNOSIS — N28.89 RENAL MASS: ICD-10-CM

## 2021-01-01 DIAGNOSIS — N28.89 RENAL MASS: Primary | ICD-10-CM

## 2021-01-01 DIAGNOSIS — N28.1 RENAL CYST: ICD-10-CM

## 2021-01-01 DIAGNOSIS — D72.810 LYMPHOPENIA: ICD-10-CM

## 2021-01-01 DIAGNOSIS — Z90.5 H/O PARTIAL NEPHRECTOMY: ICD-10-CM

## 2021-01-01 DIAGNOSIS — E11.29 TYPE 2 DIABETES MELLITUS WITH OTHER DIABETIC KIDNEY COMPLICATION, WITHOUT LONG-TERM CURRENT USE OF INSULIN (HCC): Primary | ICD-10-CM

## 2021-01-01 DIAGNOSIS — J69.0 ASPIRATION PNEUMONIA OF BOTH LUNGS, UNSPECIFIED ASPIRATION PNEUMONIA TYPE, UNSPECIFIED PART OF LUNG (HCC): ICD-10-CM

## 2021-01-01 DIAGNOSIS — C64.2 RENAL CELL CARCINOMA OF LEFT KIDNEY (HCC): Primary | ICD-10-CM

## 2021-01-01 DIAGNOSIS — E11.29 TYPE 2 DIABETES MELLITUS WITH OTHER DIABETIC KIDNEY COMPLICATION, WITHOUT LONG-TERM CURRENT USE OF INSULIN (HCC): ICD-10-CM

## 2021-01-01 DIAGNOSIS — E55.9 VITAMIN D DEFICIENCY: ICD-10-CM

## 2021-01-01 DIAGNOSIS — R77.8 ELEVATED TROPONIN: ICD-10-CM

## 2021-01-01 DIAGNOSIS — N17.9 ACUTE KIDNEY INJURY SUPERIMPOSED ON CKD (HCC): Primary | ICD-10-CM

## 2021-01-01 DIAGNOSIS — N18.32 STAGE 3B CHRONIC KIDNEY DISEASE (HCC): Primary | ICD-10-CM

## 2021-01-01 DIAGNOSIS — I12.9 HYPERTENSIVE RENAL DISEASE, STAGE 1 THROUGH STAGE 4 OR UNSPECIFIED CHRONIC KIDNEY DISEASE: ICD-10-CM

## 2021-01-01 DIAGNOSIS — R80.9 NEPHROTIC RANGE PROTEINURIA: ICD-10-CM

## 2021-01-01 DIAGNOSIS — R19.04 LEFT LOWER QUADRANT ABDOMINAL MASS: Primary | ICD-10-CM

## 2021-01-01 DIAGNOSIS — N18.9 ACUTE KIDNEY INJURY SUPERIMPOSED ON CKD (HCC): Primary | ICD-10-CM

## 2021-01-01 DIAGNOSIS — R19.04 LEFT LOWER QUADRANT ABDOMINAL MASS: ICD-10-CM

## 2021-01-01 DIAGNOSIS — Z91.81 AT HIGH RISK FOR FALLS: ICD-10-CM

## 2021-01-01 DIAGNOSIS — M79.89 LEG SWELLING: ICD-10-CM

## 2021-01-01 DIAGNOSIS — N05.1 FSGS (FOCAL SEGMENTAL GLOMERULOSCLEROSIS): ICD-10-CM

## 2021-01-01 DIAGNOSIS — R80.9 NEPHROTIC RANGE PROTEINURIA: Primary | ICD-10-CM

## 2021-01-01 DIAGNOSIS — I46.9 CARDIOPULMONARY ARREST WITH SUCCESSFUL RESUSCITATION (HCC): Primary | ICD-10-CM

## 2021-01-01 DIAGNOSIS — N18.30 STAGE 3 CHRONIC KIDNEY DISEASE, UNSPECIFIED WHETHER STAGE 3A OR 3B CKD (HCC): ICD-10-CM

## 2021-01-01 LAB
ACINETOBACTER CALCOACETICUS-BAUMANNII BY PCR: NOT DETECTED
ADENOVIRUS BY PCR: NOT DETECTED
ALBUMIN SERPL-MCNC: 2.8 G/DL (ref 3.5–5.1)
ALBUMIN SERPL-MCNC: 3.3 G/DL (ref 3.5–5.1)
ALBUMIN SERPL-MCNC: 3.6 G/DL (ref 3.5–5.1)
ALBUMIN SERPL-MCNC: 3.8 G/DL (ref 3.5–5.1)
ALLEN TEST: POSITIVE
ALP BLD-CCNC: 108 U/L (ref 38–126)
ALP BLD-CCNC: 116 U/L (ref 38–126)
ALP BLD-CCNC: 123 U/L (ref 38–126)
ALP BLD-CCNC: 179 U/L (ref 38–126)
ALT SERPL-CCNC: 106 U/L (ref 11–66)
ALT SERPL-CCNC: 114 U/L (ref 11–66)
ALT SERPL-CCNC: 13 U/L (ref 11–66)
ALT SERPL-CCNC: 13 U/L (ref 11–66)
ANION GAP SERPL CALCULATED.3IONS-SCNC: 11 MEQ/L (ref 8–16)
ANION GAP SERPL CALCULATED.3IONS-SCNC: 13 MEQ/L (ref 8–16)
ANION GAP SERPL CALCULATED.3IONS-SCNC: 13 MEQ/L (ref 8–16)
ANION GAP SERPL CALCULATED.3IONS-SCNC: 14 MEQ/L (ref 8–16)
ANION GAP SERPL CALCULATED.3IONS-SCNC: 14 MEQ/L (ref 8–16)
ANION GAP SERPL CALCULATED.3IONS-SCNC: 15 MEQ/L (ref 8–16)
ANION GAP SERPL CALCULATED.3IONS-SCNC: 15 MEQ/L (ref 8–16)
ANION GAP SERPL CALCULATED.3IONS-SCNC: 22 MEQ/L (ref 8–16)
ANION GAP SERPL CALCULATED.3IONS-SCNC: 9 MEQ/L (ref 8–16)
ANTI RNP AB: 2 UNITS (ref 0–19)
ANTI SSA: NORMAL
ANTI SSB: 0 AU/ML (ref 0–40)
ANTI-SMITH: 4 AU/ML (ref 0–40)
AST SERPL-CCNC: 18 U/L (ref 5–40)
AST SERPL-CCNC: 258 U/L (ref 5–40)
AST SERPL-CCNC: 26 U/L (ref 5–40)
AST SERPL-CCNC: 283 U/L (ref 5–40)
AVERAGE GLUCOSE: 162 MG/DL (ref 70–126)
AVERAGE GLUCOSE: 168 MG/DL (ref 70–126)
AVERAGE GLUCOSE: 192 MG/DL (ref 70–126)
BACTERIA: ABNORMAL
BACTERIA: ABNORMAL
BASE EXCESS (CALCULATED): -1.4 MMOL/L (ref -2.5–2.5)
BASE EXCESS (CALCULATED): -3.3 MMOL/L (ref -2.5–2.5)
BASE EXCESS (CALCULATED): -3.5 MMOL/L (ref -2.5–2.5)
BASE EXCESS (CALCULATED): -4.6 MMOL/L (ref -2.5–2.5)
BASE EXCESS (CALCULATED): -8.4 MMOL/L (ref -2.5–2.5)
BASE EXCESS (CALCULATED): ABNORMAL MMOL/L (ref -2.5–2.5)
BASOPHILS # BLD: 0.4 %
BASOPHILS # BLD: 0.6 %
BASOPHILS # BLD: 0.6 %
BASOPHILS # BLD: 0.8 %
BASOPHILS ABSOLUTE: 0 THOU/MM3 (ref 0–0.1)
BASOPHILS ABSOLUTE: 0.1 THOU/MM3 (ref 0–0.1)
BILIRUB SERPL-MCNC: 0.2 MG/DL (ref 0.3–1.2)
BILIRUB SERPL-MCNC: 0.2 MG/DL (ref 0.3–1.2)
BILIRUB SERPL-MCNC: < 0.2 MG/DL (ref 0.3–1.2)
BILIRUB SERPL-MCNC: < 0.2 MG/DL (ref 0.3–1.2)
BILIRUBIN DIRECT: < 0.2 MG/DL (ref 0–0.3)
BILIRUBIN DIRECT: < 0.2 MG/DL (ref 0–0.3)
BILIRUBIN URINE: NEGATIVE
BILIRUBIN URINE: NEGATIVE
BLOOD, URINE: ABNORMAL
BLOOD, URINE: NEGATIVE
BUN BLDV-MCNC: 30 MG/DL (ref 7–22)
BUN BLDV-MCNC: 31 MG/DL (ref 7–22)
BUN BLDV-MCNC: 31 MG/DL (ref 7–22)
BUN BLDV-MCNC: 35 MG/DL (ref 7–22)
BUN BLDV-MCNC: 36 MG/DL (ref 7–22)
BUN BLDV-MCNC: 39 MG/DL (ref 7–22)
BUN BLDV-MCNC: 43 MG/DL (ref 7–22)
BUN BLDV-MCNC: 50 MG/DL (ref 7–22)
BUN BLDV-MCNC: 56 MG/DL (ref 7–22)
BUN BLDV-MCNC: 57 MG/DL (ref 7–22)
BUN BLDV-MCNC: 71 MG/DL (ref 7–22)
BUN BLDV-MCNC: 72 MG/DL (ref 7–22)
BUN BLDV-MCNC: 75 MG/DL (ref 7–22)
C-REACTIVE PROTEIN: < 0.3 MG/DL (ref 0–1)
CALCIUM SERPL-MCNC: 11.5 MG/DL (ref 8.5–10.5)
CALCIUM SERPL-MCNC: 8.2 MG/DL (ref 8.5–10.5)
CALCIUM SERPL-MCNC: 8.6 MG/DL (ref 8.5–10.5)
CALCIUM SERPL-MCNC: 8.8 MG/DL (ref 8.5–10.5)
CALCIUM SERPL-MCNC: 9 MG/DL (ref 8.5–10.5)
CALCIUM SERPL-MCNC: 9.2 MG/DL (ref 8.5–10.5)
CALCIUM SERPL-MCNC: 9.3 MG/DL (ref 8.5–10.5)
CALCIUM SERPL-MCNC: 9.4 MG/DL (ref 8.5–10.5)
CALCIUM SERPL-MCNC: 9.4 MG/DL (ref 8.5–10.5)
CALCIUM SERPL-MCNC: 9.7 MG/DL (ref 8.5–10.5)
CALCIUM SERPL-MCNC: 9.9 MG/DL (ref 8.5–10.5)
CASTS: ABNORMAL /LPF
CHARACTER, URINE: CLEAR
CHARACTER, URINE: CLEAR
CHLAMYDIA PNEUMONIAE BY PCR: NOT DETECTED
CHLORIDE BLD-SCNC: 100 MEQ/L (ref 98–111)
CHLORIDE BLD-SCNC: 103 MEQ/L (ref 98–111)
CHLORIDE BLD-SCNC: 104 MEQ/L (ref 98–111)
CHLORIDE BLD-SCNC: 105 MEQ/L (ref 98–111)
CHLORIDE BLD-SCNC: 105 MEQ/L (ref 98–111)
CHLORIDE BLD-SCNC: 106 MEQ/L (ref 98–111)
CHLORIDE BLD-SCNC: 107 MEQ/L (ref 98–111)
CHLORIDE BLD-SCNC: 107 MEQ/L (ref 98–111)
CHLORIDE BLD-SCNC: 109 MEQ/L (ref 98–111)
CHLORIDE BLD-SCNC: 98 MEQ/L (ref 98–111)
CO2: 14 MEQ/L (ref 23–33)
CO2: 21 MEQ/L (ref 23–33)
CO2: 22 MEQ/L (ref 23–33)
CO2: 22 MEQ/L (ref 23–33)
CO2: 23 MEQ/L (ref 23–33)
CO2: 24 MEQ/L (ref 23–33)
CO2: 24 MEQ/L (ref 23–33)
CO2: 25 MEQ/L (ref 23–33)
CO2: 26 MEQ/L (ref 23–33)
CO2: 26 MEQ/L (ref 23–33)
CO2: 28 MEQ/L (ref 23–33)
COLLECTED BY:: ABNORMAL
COLOR: YELLOW
COLOR: YELLOW
CORTISOL COLLECTION INFO: NORMAL
CORTISOL: 13.36 UG/DL
CREAT SERPL-MCNC: 2 MG/DL (ref 0.4–1.2)
CREAT SERPL-MCNC: 2 MG/DL (ref 0.4–1.2)
CREAT SERPL-MCNC: 2.1 MG/DL (ref 0.4–1.2)
CREAT SERPL-MCNC: 2.9 MG/DL (ref 0.4–1.2)
CREAT SERPL-MCNC: 3.1 MG/DL (ref 0.4–1.2)
CREAT SERPL-MCNC: 3.1 MG/DL (ref 0.4–1.2)
CREAT SERPL-MCNC: 3.2 MG/DL (ref 0.4–1.2)
CREAT SERPL-MCNC: 3.2 MG/DL (ref 0.4–1.2)
CREAT SERPL-MCNC: 3.8 MG/DL (ref 0.4–1.2)
CREAT SERPL-MCNC: 3.8 MG/DL (ref 0.4–1.2)
CREAT SERPL-MCNC: 4.4 MG/DL (ref 0.4–1.2)
CREAT SERPL-MCNC: 4.5 MG/DL (ref 0.4–1.2)
CREAT SERPL-MCNC: 5 MG/DL (ref 0.4–1.2)
CREATININE URINE: 18.8 MG/DL
CREATININE URINE: 69.1 MG/DL
CREATININE URINE: 99.5 MG/DL
CRENATED RBC'S: ABNORMAL
CRYSTALS: ABNORMAL
CRYSTALS: ABNORMAL
DEVICE: ABNORMAL
DSDNA ANTIBODY: NORMAL
EKG ATRIAL RATE: 107 BPM
EKG ATRIAL RATE: 166 BPM
EKG ATRIAL RATE: 57 BPM
EKG P AXIS: 32 DEGREES
EKG P AXIS: 52 DEGREES
EKG P AXIS: 64 DEGREES
EKG P-R INTERVAL: 176 MS
EKG P-R INTERVAL: 288 MS
EKG Q-T INTERVAL: 358 MS
EKG Q-T INTERVAL: 438 MS
EKG Q-T INTERVAL: 474 MS
EKG Q-T INTERVAL: 480 MS
EKG QRS DURATION: 122 MS
EKG QRS DURATION: 124 MS
EKG QRS DURATION: 140 MS
EKG QRS DURATION: 148 MS
EKG QTC CALCULATION (BAZETT): 461 MS
EKG QTC CALCULATION (BAZETT): 467 MS
EKG QTC CALCULATION (BAZETT): 477 MS
EKG QTC CALCULATION (BAZETT): 495 MS
EKG R AXIS: -2 DEGREES
EKG R AXIS: -51 DEGREES
EKG R AXIS: -54 DEGREES
EKG R AXIS: 97 DEGREES
EKG T AXIS: 107 DEGREES
EKG T AXIS: 108 DEGREES
EKG T AXIS: 116 DEGREES
EKG T AXIS: 72 DEGREES
EKG VENTRICULAR RATE: 107 BPM
EKG VENTRICULAR RATE: 57 BPM
EKG VENTRICULAR RATE: 57 BPM
EKG VENTRICULAR RATE: 77 BPM
ENTEROBACTER CLOACAE COMPLEX BY PCR: NOT DETECTED
EOSINOPHIL # BLD: 0.4 %
EOSINOPHIL # BLD: 1.6 %
EOSINOPHIL # BLD: 3.7 %
EOSINOPHIL # BLD: 5.8 %
EOSINOPHILS ABSOLUTE: 0.1 THOU/MM3 (ref 0–0.4)
EOSINOPHILS ABSOLUTE: 0.1 THOU/MM3 (ref 0–0.4)
EOSINOPHILS ABSOLUTE: 0.3 THOU/MM3 (ref 0–0.4)
EOSINOPHILS ABSOLUTE: 0.4 THOU/MM3 (ref 0–0.4)
EPITHELIAL CELLS, UA: ABNORMAL /HPF
EPITHELIAL CELLS, UA: ABNORMAL /HPF
ERYTHROCYTE [DISTWIDTH] IN BLOOD BY AUTOMATED COUNT: 12.2 % (ref 11.5–14.5)
ERYTHROCYTE [DISTWIDTH] IN BLOOD BY AUTOMATED COUNT: 12.2 % (ref 11.5–14.5)
ERYTHROCYTE [DISTWIDTH] IN BLOOD BY AUTOMATED COUNT: 12.7 % (ref 11.5–14.5)
ERYTHROCYTE [DISTWIDTH] IN BLOOD BY AUTOMATED COUNT: 12.7 % (ref 11.5–14.5)
ERYTHROCYTE [DISTWIDTH] IN BLOOD BY AUTOMATED COUNT: 12.8 % (ref 11.5–14.5)
ERYTHROCYTE [DISTWIDTH] IN BLOOD BY AUTOMATED COUNT: 13 % (ref 11.5–14.5)
ERYTHROCYTE [DISTWIDTH] IN BLOOD BY AUTOMATED COUNT: 13.4 % (ref 11.5–14.5)
ERYTHROCYTE [DISTWIDTH] IN BLOOD BY AUTOMATED COUNT: 39.9 FL (ref 35–45)
ERYTHROCYTE [DISTWIDTH] IN BLOOD BY AUTOMATED COUNT: 40.9 FL (ref 35–45)
ERYTHROCYTE [DISTWIDTH] IN BLOOD BY AUTOMATED COUNT: 41.3 FL (ref 35–45)
ERYTHROCYTE [DISTWIDTH] IN BLOOD BY AUTOMATED COUNT: 41.3 FL (ref 35–45)
ERYTHROCYTE [DISTWIDTH] IN BLOOD BY AUTOMATED COUNT: 44 FL (ref 35–45)
ERYTHROCYTE [DISTWIDTH] IN BLOOD BY AUTOMATED COUNT: 45.4 FL (ref 35–45)
ERYTHROCYTE [DISTWIDTH] IN BLOOD BY AUTOMATED COUNT: 48.2 FL (ref 35–45)
ESCHERICHIA COLI BY PCR: NOT DETECTED
GFR SERPL CREATININE-BSD FRML MDRD: 10 ML/MIN/1.73M2
GFR SERPL CREATININE-BSD FRML MDRD: 12 ML/MIN/1.73M2
GFR SERPL CREATININE-BSD FRML MDRD: 12 ML/MIN/1.73M2
GFR SERPL CREATININE-BSD FRML MDRD: 14 ML/MIN/1.73M2
GFR SERPL CREATININE-BSD FRML MDRD: 14 ML/MIN/1.73M2
GFR SERPL CREATININE-BSD FRML MDRD: 15 ML/MIN/1.73M2
GFR SERPL CREATININE-BSD FRML MDRD: 15 ML/MIN/1.73M2
GFR SERPL CREATININE-BSD FRML MDRD: 16 ML/MIN/1.73M2
GFR SERPL CREATININE-BSD FRML MDRD: 23 ML/MIN/1.73M2
GFR SERPL CREATININE-BSD FRML MDRD: 24 ML/MIN/1.73M2
GFR SERPL CREATININE-BSD FRML MDRD: 24 ML/MIN/1.73M2
GFR SERPL CREATININE-BSD FRML MDRD: 8 ML/MIN/1.73M2
GFR SERPL CREATININE-BSD FRML MDRD: 9 ML/MIN/1.73M2
GLUCOSE BLD-MCNC: 126 MG/DL (ref 70–108)
GLUCOSE BLD-MCNC: 132 MG/DL (ref 70–108)
GLUCOSE BLD-MCNC: 137 MG/DL (ref 70–108)
GLUCOSE BLD-MCNC: 145 MG/DL (ref 70–108)
GLUCOSE BLD-MCNC: 150 MG/DL (ref 70–108)
GLUCOSE BLD-MCNC: 170 MG/DL (ref 70–108)
GLUCOSE BLD-MCNC: 176 MG/DL (ref 70–108)
GLUCOSE BLD-MCNC: 178 MG/DL (ref 70–108)
GLUCOSE BLD-MCNC: 181 MG/DL (ref 70–108)
GLUCOSE BLD-MCNC: 187 MG/DL (ref 70–108)
GLUCOSE BLD-MCNC: 193 MG/DL (ref 70–108)
GLUCOSE BLD-MCNC: 219 MG/DL (ref 70–108)
GLUCOSE BLD-MCNC: 226 MG/DL (ref 70–108)
GLUCOSE BLD-MCNC: 251 MG/DL (ref 70–108)
GLUCOSE BLD-MCNC: 252 MG/DL (ref 70–108)
GLUCOSE BLD-MCNC: 256 MG/DL (ref 70–108)
GLUCOSE BLD-MCNC: 264 MG/DL (ref 70–108)
GLUCOSE BLD-MCNC: 277 MG/DL (ref 70–108)
GLUCOSE BLD-MCNC: 306 MG/DL (ref 70–108)
GLUCOSE BLD-MCNC: 306 MG/DL (ref 70–108)
GLUCOSE BLD-MCNC: 317 MG/DL (ref 70–108)
GLUCOSE BLD-MCNC: 323 MG/DL (ref 70–108)
GLUCOSE BLD-MCNC: 398 MG/DL (ref 70–108)
GLUCOSE BLD-MCNC: 446 MG/DL (ref 70–108)
GLUCOSE BLD-MCNC: 90 MG/DL (ref 70–108)
GLUCOSE BLD-MCNC: 99 MG/DL (ref 70–108)
GLUCOSE, URINE: NEGATIVE MG/DL
GLUCOSE, URINE: NEGATIVE MG/DL
HAEMOPHILUS INFLUENZAE BY PCR: NOT DETECTED
HBA1C MFR BLD: 7.3 % (ref 4.3–5.7)
HBA1C MFR BLD: 7.4 % (ref 4.4–6.4)
HBA1C MFR BLD: 7.6 % (ref 4.4–6.4)
HBA1C MFR BLD: 8.4 % (ref 4.4–6.4)
HBA1C MFR BLD: NORMAL %
HCO3: 22 MMOL/L (ref 23–28)
HCO3: 25 MMOL/L (ref 23–28)
HCO3: 26 MMOL/L (ref 23–28)
HCO3: ABNORMAL MMOL/L (ref 23–28)
HCT VFR BLD CALC: 32.5 % (ref 37–47)
HCT VFR BLD CALC: 32.9 % (ref 37–47)
HCT VFR BLD CALC: 33.6 % (ref 37–47)
HCT VFR BLD CALC: 36.1 % (ref 37–47)
HCT VFR BLD CALC: 36.5 % (ref 37–47)
HCT VFR BLD CALC: 36.5 % (ref 37–47)
HCT VFR BLD CALC: 38.2 % (ref 37–47)
HEMOGLOBIN: 10 GM/DL (ref 12–16)
HEMOGLOBIN: 10.2 GM/DL (ref 12–16)
HEMOGLOBIN: 10.3 GM/DL (ref 12–16)
HEMOGLOBIN: 10.6 GM/DL (ref 12–16)
HEMOGLOBIN: 11.4 GM/DL (ref 12–16)
HEMOGLOBIN: 11.7 GM/DL (ref 12–16)
HEMOGLOBIN: 12.2 GM/DL (ref 12–16)
HOURS COLLECTED: 24 HRS
HYPOCHROMIA: PRESENT
IFIO2: 100
IFIO2: 100
IFIO2: 50
IFIO2: 50
IMMATURE GRANS (ABS): 0.03 THOU/MM3 (ref 0–0.07)
IMMATURE GRANS (ABS): 0.05 THOU/MM3 (ref 0–0.07)
IMMATURE GRANS (ABS): 0.08 THOU/MM3 (ref 0–0.07)
IMMATURE GRANS (ABS): 0.3 THOU/MM3 (ref 0–0.07)
IMMATURE GRANULOCYTES: 0.4 %
IMMATURE GRANULOCYTES: 0.6 %
IMMATURE GRANULOCYTES: 0.6 %
IMMATURE GRANULOCYTES: 8.1 %
INFLUENZA A BY PCR: NOT DETECTED
INFLUENZA B BY PCR: NOT DETECTED
KETONES, URINE: NEGATIVE
KETONES, URINE: NEGATIVE
KLEBSIELLA AEROGENES BY PCR: NOT DETECTED
KLEBSIELLA OXYTOCA BY PCR: NOT DETECTED
KLEBSIELLA PNEUMONIAE GROUP BY PCR: NOT DETECTED
LACTIC ACID: 1.9 MMOL/L (ref 0.5–2)
LACTIC ACID: 12.4 MMOL/L (ref 0.5–2)
LACTIC ACID: 2.7 MMOL/L (ref 0.5–2)
LACTIC ACID: 3.5 MMOL/L (ref 0.5–2)
LEGIONELLA PNEUMOPHILIA BY PCR: NOT DETECTED
LEUKOCYTE EST, POC: ABNORMAL
LEUKOCYTE EST, POC: NEGATIVE
LIPASE: 75.8 U/L (ref 5.6–51.3)
LV EF: 33 %
LVEF MODALITY: NORMAL
LYMPHOCYTES # BLD: 3.9 %
LYMPHOCYTES # BLD: 45 %
LYMPHOCYTES # BLD: 9.6 %
LYMPHOCYTES # BLD: 9.7 %
LYMPHOCYTES ABSOLUTE: 0.5 THOU/MM3 (ref 1–4.8)
LYMPHOCYTES ABSOLUTE: 0.7 THOU/MM3 (ref 1–4.8)
LYMPHOCYTES ABSOLUTE: 0.8 THOU/MM3 (ref 1–4.8)
LYMPHOCYTES ABSOLUTE: 1.7 THOU/MM3 (ref 1–4.8)
MAGNESIUM: 1.9 MG/DL (ref 1.6–2.4)
MAGNESIUM: 2 MG/DL (ref 1.6–2.4)
MCH RBC QN AUTO: 28.2 PG (ref 26–33)
MCH RBC QN AUTO: 28.3 PG (ref 26–33)
MCH RBC QN AUTO: 28.5 PG (ref 26–33)
MCH RBC QN AUTO: 29 PG (ref 26–33)
MCH RBC QN AUTO: 29.7 PG (ref 26–33)
MCHC RBC AUTO-ENTMCNC: 28.2 GM/DL (ref 32.2–35.5)
MCHC RBC AUTO-ENTMCNC: 30.8 GM/DL (ref 32.2–35.5)
MCHC RBC AUTO-ENTMCNC: 31 GM/DL (ref 32.2–35.5)
MCHC RBC AUTO-ENTMCNC: 31.5 GM/DL (ref 32.2–35.5)
MCHC RBC AUTO-ENTMCNC: 31.6 GM/DL (ref 32.2–35.5)
MCHC RBC AUTO-ENTMCNC: 31.9 GM/DL (ref 32.2–35.5)
MCHC RBC AUTO-ENTMCNC: 32.1 GM/DL (ref 32.2–35.5)
MCV RBC AUTO: 102.8 FL (ref 81–99)
MCV RBC AUTO: 89.4 FL (ref 81–99)
MCV RBC AUTO: 90.3 FL (ref 81–99)
MCV RBC AUTO: 90.3 FL (ref 81–99)
MCV RBC AUTO: 92.1 FL (ref 81–99)
MCV RBC AUTO: 92.9 FL (ref 81–99)
MCV RBC AUTO: 93.5 FL (ref 81–99)
METAPNEUMOVIRUS BY PCR: NOT DETECTED
MISCELLANEOUS LAB TEST RESULT: ABNORMAL
MISCELLANEOUS LAB TEST RESULT: ABNORMAL
MODE: ABNORMAL
MONOCYTES # BLD: 1.5 %
MONOCYTES # BLD: 4 %
MONOCYTES # BLD: 5.4 %
MONOCYTES # BLD: 7.9 %
MONOCYTES ABSOLUTE: 0.1 THOU/MM3 (ref 0.4–1.3)
MONOCYTES ABSOLUTE: 0.2 THOU/MM3 (ref 0.4–1.3)
MONOCYTES ABSOLUTE: 0.4 THOU/MM3 (ref 0.4–1.3)
MONOCYTES ABSOLUTE: 0.6 THOU/MM3 (ref 0.4–1.3)
MORAXELLA CATARRHALIS BY PCR: NOT DETECTED
MRSA SCREEN RT-PCR: NEGATIVE
MYCOPLASMA PNEUMONIAE BY PCR: NOT DETECTED
NITRITE, URINE: NEGATIVE
NITRITE, URINE: NEGATIVE
NON-SARS CORONAVIRUS: NOT DETECTED
NUCLEATED RED BLOOD CELLS: 0 /100 WBC
O2 SATURATION: 100 %
O2 SATURATION: 66 %
O2 SATURATION: 92 %
O2 SATURATION: 95 %
O2 SATURATION: 98 %
O2 SATURATION: ABNORMAL %
OSMOLALITY CALCULATION: 292.6 MOSMOL/KG (ref 275–300)
OSMOLALITY CALCULATION: 306.7 MOSMOL/KG (ref 275–300)
PARAINFLUENZA VIRUS BY PCR: NOT DETECTED
PCO2 (TEMP CORRECTED): 56 (ref 35–45)
PCO2 (TEMP CORRECTED): 70 (ref 35–45)
PCO2: 129 MMHG (ref 35–45)
PCO2: 149 MMHG (ref 35–45)
PCO2: 40 MMHG (ref 35–45)
PCO2: 50 MMHG (ref 35–45)
PCO2: 58 MMHG (ref 35–45)
PCO2: 73 MMHG (ref 35–45)
PH (TEMPERATURE CORRECTED): 7.16 (ref 7.35–7.45)
PH (TEMPERATURE CORRECTED): 7.25 (ref 7.35–7.45)
PH BLOOD GAS: 6.5 (ref 7.35–7.45)
PH BLOOD GAS: 6.91 (ref 7.35–7.45)
PH BLOOD GAS: 7.15 (ref 7.35–7.45)
PH BLOOD GAS: 7.24 (ref 7.35–7.45)
PH BLOOD GAS: 7.31 (ref 7.35–7.45)
PH BLOOD GAS: 7.35 (ref 7.35–7.45)
PH UA: 5.5 (ref 5–9)
PH UA: 6 (ref 5–9)
PHOSPHORUS: 3.8 MG/DL (ref 2.4–4.7)
PHOSPHORUS: 4.8 MG/DL (ref 2.4–4.7)
PIP: 30 CMH2O
PLATELET # BLD: 191 THOU/MM3 (ref 130–400)
PLATELET # BLD: 218 THOU/MM3 (ref 130–400)
PLATELET # BLD: 222 THOU/MM3 (ref 130–400)
PLATELET # BLD: 223 THOU/MM3 (ref 130–400)
PLATELET # BLD: 240 THOU/MM3 (ref 130–400)
PLATELET # BLD: 242 THOU/MM3 (ref 130–400)
PLATELET # BLD: 252 THOU/MM3 (ref 130–400)
PLATELET ESTIMATE: ADEQUATE
PMV BLD AUTO: 10.2 FL (ref 9.4–12.4)
PMV BLD AUTO: 10.3 FL (ref 9.4–12.4)
PMV BLD AUTO: 9.3 FL (ref 9.4–12.4)
PMV BLD AUTO: 9.3 FL (ref 9.4–12.4)
PMV BLD AUTO: 9.4 FL (ref 9.4–12.4)
PMV BLD AUTO: 9.7 FL (ref 9.4–12.4)
PMV BLD AUTO: 9.8 FL (ref 9.4–12.4)
PO2 (TEMP CORRECTED): 318 (ref 71–104)
PO2 (TEMP CORRECTED): 43 (ref 71–104)
PO2: 168 MMHG (ref 71–104)
PO2: 323 MMHG (ref 71–104)
PO2: 46 MMHG (ref 71–104)
PO2: 66 MMHG (ref 71–104)
PO2: 77 MMHG (ref 71–104)
PO2: 84 MMHG (ref 71–104)
POC CREATININE WHOLE BLOOD: 3.6 MG/DL (ref 0.5–1.2)
POIKILOCYTES: ABNORMAL
POTASSIUM REFLEX MAGNESIUM: 4.4 MEQ/L (ref 3.5–5.2)
POTASSIUM REFLEX MAGNESIUM: 4.6 MEQ/L (ref 3.5–5.2)
POTASSIUM REFLEX MAGNESIUM: 5 MEQ/L (ref 3.5–5.2)
POTASSIUM SERPL-SCNC: 3.8 MEQ/L (ref 3.5–5.2)
POTASSIUM SERPL-SCNC: 4 MEQ/L (ref 3.5–5.2)
POTASSIUM SERPL-SCNC: 4.1 MEQ/L (ref 3.5–5.2)
POTASSIUM SERPL-SCNC: 4.1 MEQ/L (ref 3.5–5.2)
POTASSIUM SERPL-SCNC: 4.3 MEQ/L (ref 3.5–5.2)
POTASSIUM SERPL-SCNC: 4.4 MEQ/L (ref 3.5–5.2)
POTASSIUM SERPL-SCNC: 4.6 MEQ/L (ref 3.5–5.2)
POTASSIUM SERPL-SCNC: 5.9 MEQ/L (ref 3.5–5.2)
PRO-BNP: 9869 PG/ML (ref 0–1800)
PROCALCITONIN: 46.61 NG/ML (ref 0.01–0.09)
PROT/CREAT RATIO, UR: 14.27
PROT/CREAT RATIO, UR: 7.3
PROT/CREAT RATIO, UR: 8.69
PROTEIN 24 HOUR URINE: 4919 MG/24 HR (ref 42–225)
PROTEIN UA: 300 MG/DL
PROTEIN UA: >= 300 MG/DL
PROTEIN, URINE: 182.2 MG/DL
PROTEIN, URINE: 268.2 MG/DL
PROTEIN, URINE: 600.6 MG/DL
PROTEIN, URINE: 726.6 MG/DL
PROTEUS SPECIES BY PCR: NOT DETECTED
PSEUDOMONAS AERUGINOSA BY PCR: NOT DETECTED
PTH INTACT: 252.1 PG/ML (ref 15–65)
PTH INTACT: 69 PG/ML (ref 15–65)
RBC # BLD: 3.52 MILL/MM3 (ref 4.2–5.4)
RBC # BLD: 3.53 MILL/MM3 (ref 4.2–5.4)
RBC # BLD: 3.55 MILL/MM3 (ref 4.2–5.4)
RBC # BLD: 3.72 MILL/MM3 (ref 4.2–5.4)
RBC # BLD: 4.04 MILL/MM3 (ref 4.2–5.4)
RBC # BLD: 4.04 MILL/MM3 (ref 4.2–5.4)
RBC # BLD: 4.11 MILL/MM3 (ref 4.2–5.4)
RBC URINE: ABNORMAL /HPF
RBC URINE: ABNORMAL /HPF
RENAL EPITHELIAL, UA: ABNORMAL
RENAL EPITHELIAL, UA: ABNORMAL
RESISTANT GENE CTX-M BY PCR: NORMAL
RESISTANT GENE IMP BY PCR: NORMAL
RESISTANT GENE KPC BY PCR: NORMAL
RESISTANT GENE MECA/C & MREJ BY PCR: NORMAL
RESISTANT GENE NDM BY PCR: NORMAL
RESISTANT GENE OXA-48-LIKE BY PCR: NORMAL
RESISTANT GENE VIM BY PCR: NORMAL
RESPIRATORY SYNCYTIAL VIRUS BY PCR: NOT DETECTED
RHINOVIRUS ENTEROVIRUS PCR: NOT DETECTED
SARS-COV-2, NAAT: NOT  DETECTED
SCAN OF BLOOD SMEAR: NORMAL
SEDIMENTATION RATE, ERYTHROCYTE: 68 MM/HR (ref 0–20)
SEG NEUTROPHILS: 40.5 %
SEG NEUTROPHILS: 77.5 %
SEG NEUTROPHILS: 78.2 %
SEG NEUTROPHILS: 93.2 %
SEGMENTED NEUTROPHILS ABSOLUTE COUNT: 1.5 THOU/MM3 (ref 1.8–7.7)
SEGMENTED NEUTROPHILS ABSOLUTE COUNT: 11.7 THOU/MM3 (ref 1.8–7.7)
SEGMENTED NEUTROPHILS ABSOLUTE COUNT: 5.6 THOU/MM3 (ref 1.8–7.7)
SEGMENTED NEUTROPHILS ABSOLUTE COUNT: 6.1 THOU/MM3 (ref 1.8–7.7)
SERRATIA MARCESCENS BY PCR: NOT DETECTED
SET PEEP: 10 MMHG
SET PEEP: 10 MMHG
SET PEEP: 12 MMHG
SET PEEP: 6 MMHG
SET PRESS SUPP: 20 CMH2O
SET PRESS SUPP: 20 CMH2O
SET PRESS SUPP: 24 CMH2O
SET RESPIRATORY RATE: 12 BPM
SET RESPIRATORY RATE: 20 BPM
SODIUM BLD-SCNC: 136 MEQ/L (ref 135–145)
SODIUM BLD-SCNC: 137 MEQ/L (ref 135–145)
SODIUM BLD-SCNC: 137 MEQ/L (ref 135–145)
SODIUM BLD-SCNC: 138 MEQ/L (ref 135–145)
SODIUM BLD-SCNC: 139 MEQ/L (ref 135–145)
SODIUM BLD-SCNC: 139 MEQ/L (ref 135–145)
SODIUM BLD-SCNC: 140 MEQ/L (ref 135–145)
SODIUM BLD-SCNC: 142 MEQ/L (ref 135–145)
SODIUM BLD-SCNC: 143 MEQ/L (ref 135–145)
SODIUM BLD-SCNC: 143 MEQ/L (ref 135–145)
SODIUM BLD-SCNC: 146 MEQ/L (ref 135–145)
SOURCE, BLOOD GAS: ABNORMAL
SOURCE: NORMAL
SPECIFIC GRAVITY UA: 1.01 (ref 1–1.03)
SPECIFIC GRAVITY UA: 1.02 (ref 1–1.03)
SPECIMEN ACCEPTABILITY: NORMAL
STAPH AUREUS BY PCR: NOT DETECTED
STREP AGALACTIAE BY PCR: NOT DETECTED
STREP PNEUMONIAE BY PCR: NOT DETECTED
STREP PYOGENES BY PCR: NOT DETECTED
TEMPERATURE: 36
TEMPERATURE: 36
TOTAL PROTEIN: 4.8 G/DL (ref 6.1–8)
TOTAL PROTEIN: 5.5 G/DL (ref 6.1–8)
TOTAL PROTEIN: 6.7 G/DL (ref 6.1–8)
TOTAL PROTEIN: 6.8 G/DL (ref 6.1–8)
TROPONIN T: 0.07 NG/ML
TROPONIN T: 6.97 NG/ML
TROPONIN T: 7.7 NG/ML
URIC ACID: 8.8 MG/DL (ref 2.4–5.7)
URINE VOLUME: 2700 ML
UROBILINOGEN, URINE: 0.2 EU/DL (ref 0–1)
UROBILINOGEN, URINE: 0.2 EU/DL (ref 0–1)
VANCOMYCIN RESISTANT ENTEROCOCCUS: NEGATIVE
VITAMIN D 25-HYDROXY: 11 NG/ML (ref 30–100)
WBC # BLD: 12.5 THOU/MM3 (ref 4.8–10.8)
WBC # BLD: 21.4 THOU/MM3 (ref 4.8–10.8)
WBC # BLD: 3.7 THOU/MM3 (ref 4.8–10.8)
WBC # BLD: 6.4 THOU/MM3 (ref 4.8–10.8)
WBC # BLD: 7.1 THOU/MM3 (ref 4.8–10.8)
WBC # BLD: 7.9 THOU/MM3 (ref 4.8–10.8)
WBC # BLD: 9.3 THOU/MM3 (ref 4.8–10.8)
WBC UA: ABNORMAL /HPF
WBC UA: ABNORMAL /HPF
YEAST: ABNORMAL
YEAST: ABNORMAL

## 2021-01-01 PROCEDURE — 84156 ASSAY OF PROTEIN URINE: CPT

## 2021-01-01 PROCEDURE — 02HV33Z INSERTION OF INFUSION DEVICE INTO SUPERIOR VENA CAVA, PERCUTANEOUS APPROACH: ICD-10-PCS | Performed by: INTERNAL MEDICINE

## 2021-01-01 PROCEDURE — G8417 CALC BMI ABV UP PARAM F/U: HCPCS | Performed by: INTERNAL MEDICINE

## 2021-01-01 PROCEDURE — G8428 CUR MEDS NOT DOCUMENT: HCPCS | Performed by: FAMILY MEDICINE

## 2021-01-01 PROCEDURE — 76770 US EXAM ABDO BACK WALL COMP: CPT

## 2021-01-01 PROCEDURE — 99223 1ST HOSP IP/OBS HIGH 75: CPT | Performed by: SURGERY

## 2021-01-01 PROCEDURE — 2500000003 HC RX 250 WO HCPCS: Performed by: EMERGENCY MEDICINE

## 2021-01-01 PROCEDURE — 2580000003 HC RX 258: Performed by: INTERNAL MEDICINE

## 2021-01-01 PROCEDURE — 87635 SARS-COV-2 COVID-19 AMP PRB: CPT

## 2021-01-01 PROCEDURE — 71045 X-RAY EXAM CHEST 1 VIEW: CPT

## 2021-01-01 PROCEDURE — 80048 BASIC METABOLIC PNL TOTAL CA: CPT

## 2021-01-01 PROCEDURE — 3051F HG A1C>EQUAL 7.0%<8.0%: CPT | Performed by: INTERNAL MEDICINE

## 2021-01-01 PROCEDURE — 4040F PNEUMOC VAC/ADMIN/RCVD: CPT | Performed by: FAMILY MEDICINE

## 2021-01-01 PROCEDURE — 99285 EMERGENCY DEPT VISIT HI MDM: CPT

## 2021-01-01 PROCEDURE — 86235 NUCLEAR ANTIGEN ANTIBODY: CPT

## 2021-01-01 PROCEDURE — G8427 DOCREV CUR MEDS BY ELIG CLIN: HCPCS | Performed by: INTERNAL MEDICINE

## 2021-01-01 PROCEDURE — 95816 EEG AWAKE AND DROWSY: CPT

## 2021-01-01 PROCEDURE — G8427 DOCREV CUR MEDS BY ELIG CLIN: HCPCS | Performed by: UROLOGY

## 2021-01-01 PROCEDURE — 1036F TOBACCO NON-USER: CPT | Performed by: INTERNAL MEDICINE

## 2021-01-01 PROCEDURE — 89220 SPUTUM SPECIMEN COLLECTION: CPT

## 2021-01-01 PROCEDURE — 1036F TOBACCO NON-USER: CPT | Performed by: NURSE PRACTITIONER

## 2021-01-01 PROCEDURE — 82570 ASSAY OF URINE CREATININE: CPT

## 2021-01-01 PROCEDURE — 3051F HG A1C>EQUAL 7.0%<8.0%: CPT | Performed by: FAMILY MEDICINE

## 2021-01-01 PROCEDURE — 1036F TOBACCO NON-USER: CPT | Performed by: FAMILY MEDICINE

## 2021-01-01 PROCEDURE — 70450 CT HEAD/BRAIN W/O DYE: CPT

## 2021-01-01 PROCEDURE — 83970 ASSAY OF PARATHORMONE: CPT

## 2021-01-01 PROCEDURE — 36600 WITHDRAWAL OF ARTERIAL BLOOD: CPT

## 2021-01-01 PROCEDURE — 1123F ACP DISCUSS/DSCN MKR DOCD: CPT | Performed by: FAMILY MEDICINE

## 2021-01-01 PROCEDURE — G8399 PT W/DXA RESULTS DOCUMENT: HCPCS | Performed by: FAMILY MEDICINE

## 2021-01-01 PROCEDURE — 0BH17EZ INSERTION OF ENDOTRACHEAL AIRWAY INTO TRACHEA, VIA NATURAL OR ARTIFICIAL OPENING: ICD-10-PCS | Performed by: EMERGENCY MEDICINE

## 2021-01-01 PROCEDURE — 87081 CULTURE SCREEN ONLY: CPT

## 2021-01-01 PROCEDURE — G8399 PT W/DXA RESULTS DOCUMENT: HCPCS | Performed by: NURSE PRACTITIONER

## 2021-01-01 PROCEDURE — 87070 CULTURE OTHR SPECIMN AEROBIC: CPT

## 2021-01-01 PROCEDURE — 1123F ACP DISCUSS/DSCN MKR DOCD: CPT | Performed by: INTERNAL MEDICINE

## 2021-01-01 PROCEDURE — 85027 COMPLETE CBC AUTOMATED: CPT

## 2021-01-01 PROCEDURE — 36415 COLL VENOUS BLD VENIPUNCTURE: CPT

## 2021-01-01 PROCEDURE — 99223 1ST HOSP IP/OBS HIGH 75: CPT | Performed by: INTERNAL MEDICINE

## 2021-01-01 PROCEDURE — 84145 PROCALCITONIN (PCT): CPT

## 2021-01-01 PROCEDURE — 84100 ASSAY OF PHOSPHORUS: CPT

## 2021-01-01 PROCEDURE — G8417 CALC BMI ABV UP PARAM F/U: HCPCS | Performed by: UROLOGY

## 2021-01-01 PROCEDURE — 2580000003 HC RX 258: Performed by: NURSE PRACTITIONER

## 2021-01-01 PROCEDURE — 87798 DETECT AGENT NOS DNA AMP: CPT

## 2021-01-01 PROCEDURE — 87631 RESP VIRUS 3-5 TARGETS: CPT

## 2021-01-01 PROCEDURE — G8417 CALC BMI ABV UP PARAM F/U: HCPCS | Performed by: FAMILY MEDICINE

## 2021-01-01 PROCEDURE — 80053 COMPREHEN METABOLIC PANEL: CPT

## 2021-01-01 PROCEDURE — 83036 HEMOGLOBIN GLYCOSYLATED A1C: CPT

## 2021-01-01 PROCEDURE — 1090F PRES/ABSN URINE INCON ASSESS: CPT | Performed by: INTERNAL MEDICINE

## 2021-01-01 PROCEDURE — 6360000002 HC RX W HCPCS: Performed by: NURSE PRACTITIONER

## 2021-01-01 PROCEDURE — 4040F PNEUMOC VAC/ADMIN/RCVD: CPT | Performed by: UROLOGY

## 2021-01-01 PROCEDURE — 4040F PNEUMOC VAC/ADMIN/RCVD: CPT | Performed by: INTERNAL MEDICINE

## 2021-01-01 PROCEDURE — 99213 OFFICE O/P EST LOW 20 MIN: CPT | Performed by: UROLOGY

## 2021-01-01 PROCEDURE — 36556 INSERT NON-TUNNEL CV CATH: CPT

## 2021-01-01 PROCEDURE — 82948 REAGENT STRIP/BLOOD GLUCOSE: CPT

## 2021-01-01 PROCEDURE — 6360000004 HC RX CONTRAST MEDICATION: Performed by: UROLOGY

## 2021-01-01 PROCEDURE — 74178 CT ABD&PLV WO CNTR FLWD CNTR: CPT

## 2021-01-01 PROCEDURE — 99213 OFFICE O/P EST LOW 20 MIN: CPT | Performed by: INTERNAL MEDICINE

## 2021-01-01 PROCEDURE — 93306 TTE W/DOPPLER COMPLETE: CPT

## 2021-01-01 PROCEDURE — 31500 INSERT EMERGENCY AIRWAY: CPT

## 2021-01-01 PROCEDURE — 74176 CT ABD & PELVIS W/O CONTRAST: CPT

## 2021-01-01 PROCEDURE — G8399 PT W/DXA RESULTS DOCUMENT: HCPCS | Performed by: UROLOGY

## 2021-01-01 PROCEDURE — 81001 URINALYSIS AUTO W/SCOPE: CPT

## 2021-01-01 PROCEDURE — 74018 RADEX ABDOMEN 1 VIEW: CPT

## 2021-01-01 PROCEDURE — 84484 ASSAY OF TROPONIN QUANT: CPT

## 2021-01-01 PROCEDURE — 87641 MR-STAPH DNA AMP PROBE: CPT

## 2021-01-01 PROCEDURE — G8399 PT W/DXA RESULTS DOCUMENT: HCPCS | Performed by: INTERNAL MEDICINE

## 2021-01-01 PROCEDURE — 6370000000 HC RX 637 (ALT 250 FOR IP): Performed by: NURSE PRACTITIONER

## 2021-01-01 PROCEDURE — 2580000003 HC RX 258: Performed by: STUDENT IN AN ORGANIZED HEALTH CARE EDUCATION/TRAINING PROGRAM

## 2021-01-01 PROCEDURE — 84132 ASSAY OF SERUM POTASSIUM: CPT

## 2021-01-01 PROCEDURE — 83605 ASSAY OF LACTIC ACID: CPT

## 2021-01-01 PROCEDURE — 1090F PRES/ABSN URINE INCON ASSESS: CPT | Performed by: UROLOGY

## 2021-01-01 PROCEDURE — 82947 ASSAY GLUCOSE BLOOD QUANT: CPT

## 2021-01-01 PROCEDURE — 5A12012 PERFORMANCE OF CARDIAC OUTPUT, SINGLE, MANUAL: ICD-10-PCS | Performed by: INTERNAL MEDICINE

## 2021-01-01 PROCEDURE — 4040F PNEUMOC VAC/ADMIN/RCVD: CPT | Performed by: NURSE PRACTITIONER

## 2021-01-01 PROCEDURE — 99214 OFFICE O/P EST MOD 30 MIN: CPT | Performed by: NURSE PRACTITIONER

## 2021-01-01 PROCEDURE — 94761 N-INVAS EAR/PLS OXIMETRY MLT: CPT

## 2021-01-01 PROCEDURE — 6370000000 HC RX 637 (ALT 250 FOR IP): Performed by: FAMILY MEDICINE

## 2021-01-01 PROCEDURE — 2700000000 HC OXYGEN THERAPY PER DAY

## 2021-01-01 PROCEDURE — 86225 DNA ANTIBODY NATIVE: CPT

## 2021-01-01 PROCEDURE — 85025 COMPLETE CBC W/AUTO DIFF WBC: CPT

## 2021-01-01 PROCEDURE — 93010 ELECTROCARDIOGRAM REPORT: CPT | Performed by: INTERNAL MEDICINE

## 2021-01-01 PROCEDURE — G8484 FLU IMMUNIZE NO ADMIN: HCPCS | Performed by: FAMILY MEDICINE

## 2021-01-01 PROCEDURE — 2580000003 HC RX 258: Performed by: EMERGENCY MEDICINE

## 2021-01-01 PROCEDURE — 87486 CHLMYD PNEUM DNA AMP PROBE: CPT

## 2021-01-01 PROCEDURE — 96360 HYDRATION IV INFUSION INIT: CPT

## 2021-01-01 PROCEDURE — 99282 EMERGENCY DEPT VISIT SF MDM: CPT

## 2021-01-01 PROCEDURE — 82330 ASSAY OF CALCIUM: CPT

## 2021-01-01 PROCEDURE — 87500 VANOMYCIN DNA AMP PROBE: CPT

## 2021-01-01 PROCEDURE — 80076 HEPATIC FUNCTION PANEL: CPT

## 2021-01-01 PROCEDURE — 87205 SMEAR GRAM STAIN: CPT

## 2021-01-01 PROCEDURE — 1090F PRES/ABSN URINE INCON ASSESS: CPT | Performed by: FAMILY MEDICINE

## 2021-01-01 PROCEDURE — 83735 ASSAY OF MAGNESIUM: CPT

## 2021-01-01 PROCEDURE — 94003 VENT MGMT INPAT SUBQ DAY: CPT

## 2021-01-01 PROCEDURE — 1123F ACP DISCUSS/DSCN MKR DOCD: CPT | Performed by: UROLOGY

## 2021-01-01 PROCEDURE — 1123F ACP DISCUSS/DSCN MKR DOCD: CPT | Performed by: NURSE PRACTITIONER

## 2021-01-01 PROCEDURE — 2500000003 HC RX 250 WO HCPCS

## 2021-01-01 PROCEDURE — 5A1945Z RESPIRATORY VENTILATION, 24-96 CONSECUTIVE HOURS: ICD-10-PCS | Performed by: EMERGENCY MEDICINE

## 2021-01-01 PROCEDURE — 82533 TOTAL CORTISOL: CPT

## 2021-01-01 PROCEDURE — 95822 EEG COMA OR SLEEP ONLY: CPT | Performed by: PSYCHIATRY & NEUROLOGY

## 2021-01-01 PROCEDURE — 84295 ASSAY OF SERUM SODIUM: CPT

## 2021-01-01 PROCEDURE — 2000000000 HC ICU R&B

## 2021-01-01 PROCEDURE — 93005 ELECTROCARDIOGRAM TRACING: CPT | Performed by: EMERGENCY MEDICINE

## 2021-01-01 PROCEDURE — A9539 TC99M PENTETATE: HCPCS | Performed by: INTERNAL MEDICINE

## 2021-01-01 PROCEDURE — 84550 ASSAY OF BLOOD/URIC ACID: CPT

## 2021-01-01 PROCEDURE — 99214 OFFICE O/P EST MOD 30 MIN: CPT | Performed by: FAMILY MEDICINE

## 2021-01-01 PROCEDURE — G8417 CALC BMI ABV UP PARAM F/U: HCPCS | Performed by: NURSE PRACTITIONER

## 2021-01-01 PROCEDURE — 83036 HEMOGLOBIN GLYCOSYLATED A1C: CPT | Performed by: FAMILY MEDICINE

## 2021-01-01 PROCEDURE — 6360000002 HC RX W HCPCS

## 2021-01-01 PROCEDURE — 99214 OFFICE O/P EST MOD 30 MIN: CPT | Performed by: UROLOGY

## 2021-01-01 PROCEDURE — 82565 ASSAY OF CREATININE: CPT

## 2021-01-01 PROCEDURE — G8427 DOCREV CUR MEDS BY ELIG CLIN: HCPCS | Performed by: NURSE PRACTITIONER

## 2021-01-01 PROCEDURE — 82435 ASSAY OF BLOOD CHLORIDE: CPT

## 2021-01-01 PROCEDURE — 83690 ASSAY OF LIPASE: CPT

## 2021-01-01 PROCEDURE — G8484 FLU IMMUNIZE NO ADMIN: HCPCS | Performed by: UROLOGY

## 2021-01-01 PROCEDURE — 3430000000 HC RX DIAGNOSTIC RADIOPHARMACEUTICAL: Performed by: INTERNAL MEDICINE

## 2021-01-01 PROCEDURE — 1090F PRES/ABSN URINE INCON ASSESS: CPT | Performed by: NURSE PRACTITIONER

## 2021-01-01 PROCEDURE — 1036F TOBACCO NON-USER: CPT | Performed by: UROLOGY

## 2021-01-01 PROCEDURE — 96361 HYDRATE IV INFUSION ADD-ON: CPT

## 2021-01-01 PROCEDURE — 87581 M.PNEUMON DNA AMP PROBE: CPT

## 2021-01-01 PROCEDURE — G8484 FLU IMMUNIZE NO ADMIN: HCPCS | Performed by: INTERNAL MEDICINE

## 2021-01-01 PROCEDURE — 99204 OFFICE O/P NEW MOD 45 MIN: CPT | Performed by: INTERNAL MEDICINE

## 2021-01-01 PROCEDURE — 87040 BLOOD CULTURE FOR BACTERIA: CPT

## 2021-01-01 PROCEDURE — 99213 OFFICE O/P EST LOW 20 MIN: CPT | Performed by: FAMILY MEDICINE

## 2021-01-01 PROCEDURE — 86140 C-REACTIVE PROTEIN: CPT

## 2021-01-01 PROCEDURE — 85651 RBC SED RATE NONAUTOMATED: CPT

## 2021-01-01 PROCEDURE — 82306 VITAMIN D 25 HYDROXY: CPT

## 2021-01-01 PROCEDURE — 99291 CRITICAL CARE FIRST HOUR: CPT | Performed by: INTERNAL MEDICINE

## 2021-01-01 PROCEDURE — 93005 ELECTROCARDIOGRAM TRACING: CPT | Performed by: NURSE PRACTITIONER

## 2021-01-01 PROCEDURE — G8427 DOCREV CUR MEDS BY ELIG CLIN: HCPCS | Performed by: FAMILY MEDICINE

## 2021-01-01 PROCEDURE — 71250 CT THORAX DX C-: CPT

## 2021-01-01 PROCEDURE — 83880 ASSAY OF NATRIURETIC PEPTIDE: CPT

## 2021-01-01 PROCEDURE — 87541 LEGION PNEUMO DNA AMP PROB: CPT

## 2021-01-01 PROCEDURE — G0439 PPPS, SUBSEQ VISIT: HCPCS | Performed by: FAMILY MEDICINE

## 2021-01-01 PROCEDURE — 6360000002 HC RX W HCPCS: Performed by: EMERGENCY MEDICINE

## 2021-01-01 PROCEDURE — 82803 BLOOD GASES ANY COMBINATION: CPT

## 2021-01-01 PROCEDURE — 78610 BRAIN FLOW IMAGING ONLY: CPT

## 2021-01-01 PROCEDURE — 94002 VENT MGMT INPAT INIT DAY: CPT

## 2021-01-01 RX ORDER — ACETAMINOPHEN 325 MG/1
650 TABLET ORAL EVERY 6 HOURS PRN
Status: DISCONTINUED | OUTPATIENT
Start: 2021-01-01 | End: 2021-01-01 | Stop reason: HOSPADM

## 2021-01-01 RX ORDER — BUMETANIDE 2 MG/1
2 TABLET ORAL DAILY
Qty: 90 TABLET | Refills: 3 | Status: SHIPPED | OUTPATIENT
Start: 2021-01-01

## 2021-01-01 RX ORDER — POLYETHYLENE GLYCOL 3350 17 G/17G
17 POWDER, FOR SOLUTION ORAL DAILY PRN
Status: DISCONTINUED | OUTPATIENT
Start: 2021-01-01 | End: 2021-01-01

## 2021-01-01 RX ORDER — CALCIUM CHLORIDE 100 MG/ML
INJECTION INTRAVENOUS; INTRAVENTRICULAR DAILY PRN
Status: COMPLETED | OUTPATIENT
Start: 2021-01-01 | End: 2021-01-01

## 2021-01-01 RX ORDER — NOREPINEPHRINE BIT/0.9 % NACL 16MG/250ML
INFUSION BOTTLE (ML) INTRAVENOUS
Status: COMPLETED
Start: 2021-01-01 | End: 2021-01-01

## 2021-01-01 RX ORDER — 0.9 % SODIUM CHLORIDE 0.9 %
1000 INTRAVENOUS SOLUTION INTRAVENOUS ONCE
Status: COMPLETED | OUTPATIENT
Start: 2021-01-01 | End: 2021-01-01

## 2021-01-01 RX ORDER — CARVEDILOL 3.12 MG/1
3.12 TABLET ORAL 2 TIMES DAILY
Qty: 60 TABLET | Refills: 3 | Status: SHIPPED | OUTPATIENT
Start: 2021-01-01 | End: 2021-01-01 | Stop reason: DRUGHIGH

## 2021-01-01 RX ORDER — ONDANSETRON 2 MG/ML
4 INJECTION INTRAMUSCULAR; INTRAVENOUS EVERY 6 HOURS PRN
Status: DISCONTINUED | OUTPATIENT
Start: 2021-01-01 | End: 2021-01-01

## 2021-01-01 RX ORDER — ERGOCALCIFEROL 1.25 MG/1
50000 CAPSULE ORAL WEEKLY
Qty: 4 CAPSULE | Refills: 1 | Status: SHIPPED | OUTPATIENT
Start: 2021-01-01

## 2021-01-01 RX ORDER — SODIUM CHLORIDE 9 MG/ML
INJECTION, SOLUTION INTRAVENOUS CONTINUOUS
Status: ACTIVE | OUTPATIENT
Start: 2021-01-01 | End: 2021-01-01

## 2021-01-01 RX ORDER — LISINOPRIL 20 MG/1
20 TABLET ORAL 2 TIMES DAILY
Qty: 180 TABLET | Refills: 1 | Status: SHIPPED | OUTPATIENT
Start: 2021-01-01 | End: 2021-01-01

## 2021-01-01 RX ORDER — GLIPIZIDE 10 MG/1
TABLET ORAL
Qty: 180 TABLET | Refills: 3 | Status: SHIPPED | OUTPATIENT
Start: 2021-01-01

## 2021-01-01 RX ORDER — SODIUM CHLORIDE 9 MG/ML
25 INJECTION, SOLUTION INTRAVENOUS PRN
Status: DISCONTINUED | OUTPATIENT
Start: 2021-01-01 | End: 2021-01-01 | Stop reason: HOSPADM

## 2021-01-01 RX ORDER — VITAMIN B COMPLEX
1000 TABLET ORAL DAILY
COMMUNITY
End: 2021-01-01

## 2021-01-01 RX ORDER — DEXTROSE MONOHYDRATE 25 G/50ML
12.5 INJECTION, SOLUTION INTRAVENOUS PRN
Status: DISCONTINUED | OUTPATIENT
Start: 2021-01-01 | End: 2021-01-01 | Stop reason: HOSPADM

## 2021-01-01 RX ORDER — ACETAMINOPHEN 650 MG/1
650 SUPPOSITORY RECTAL EVERY 6 HOURS PRN
Status: DISCONTINUED | OUTPATIENT
Start: 2021-01-01 | End: 2021-01-01 | Stop reason: HOSPADM

## 2021-01-01 RX ORDER — ATROPINE SULFATE 0.1 MG/ML
INJECTION INTRAVENOUS DAILY PRN
Status: COMPLETED | OUTPATIENT
Start: 2021-01-01 | End: 2021-01-01

## 2021-01-01 RX ORDER — AMLODIPINE BESYLATE 10 MG/1
10 TABLET ORAL DAILY
Qty: 90 TABLET | Refills: 3 | Status: SHIPPED | OUTPATIENT
Start: 2021-01-01 | End: 2021-01-01

## 2021-01-01 RX ORDER — HEPARIN SODIUM 5000 [USP'U]/ML
5000 INJECTION, SOLUTION INTRAVENOUS; SUBCUTANEOUS EVERY 8 HOURS SCHEDULED
Status: DISCONTINUED | OUTPATIENT
Start: 2021-01-01 | End: 2021-01-01

## 2021-01-01 RX ORDER — SODIUM CHLORIDE 9 MG/ML
INJECTION, SOLUTION INTRAVENOUS ONCE
Status: COMPLETED | OUTPATIENT
Start: 2021-01-01 | End: 2021-01-01

## 2021-01-01 RX ORDER — DEXTROSE MONOHYDRATE 50 MG/ML
100 INJECTION, SOLUTION INTRAVENOUS PRN
Status: DISCONTINUED | OUTPATIENT
Start: 2021-01-01 | End: 2021-01-01 | Stop reason: HOSPADM

## 2021-01-01 RX ORDER — KIT FOR THE PREPARATION OF TECHNETIUM TC 99M PENTETATE 20 MG/1
27.1 INJECTION, POWDER, LYOPHILIZED, FOR SOLUTION INTRAVENOUS; RESPIRATORY (INHALATION)
Status: COMPLETED | OUTPATIENT
Start: 2021-01-01 | End: 2021-01-01

## 2021-01-01 RX ORDER — NOREPINEPHRINE BIT/0.9 % NACL 16MG/250ML
2-100 INFUSION BOTTLE (ML) INTRAVENOUS CONTINUOUS
Status: DISCONTINUED | OUTPATIENT
Start: 2021-01-01 | End: 2021-01-01 | Stop reason: HOSPADM

## 2021-01-01 RX ORDER — FAMOTIDINE 20 MG/1
20 TABLET, FILM COATED ORAL EVERY OTHER DAY
Status: DISCONTINUED | OUTPATIENT
Start: 2021-01-01 | End: 2021-01-01

## 2021-01-01 RX ORDER — CHLORHEXIDINE GLUCONATE 0.12 MG/ML
15 RINSE ORAL 2 TIMES DAILY
Status: DISCONTINUED | OUTPATIENT
Start: 2021-01-01 | End: 2021-01-01

## 2021-01-01 RX ORDER — LOTEPREDNOL ETABONATE 5 MG/ML
1 SUSPENSION/ DROPS OPHTHALMIC DAILY
Status: DISCONTINUED | OUTPATIENT
Start: 2021-01-01 | End: 2021-01-01

## 2021-01-01 RX ORDER — CALCIUM CHLORIDE 100 MG/ML
INJECTION INTRAVENOUS; INTRAVENTRICULAR
Status: COMPLETED
Start: 2021-01-01 | End: 2021-01-01

## 2021-01-01 RX ORDER — DOPAMINE HYDROCHLORIDE 160 MG/100ML
INJECTION, SOLUTION INTRAVENOUS
Status: COMPLETED
Start: 2021-01-01 | End: 2021-01-01

## 2021-01-01 RX ORDER — ERGOCALCIFEROL 1.25 MG/1
50000 CAPSULE ORAL WEEKLY
Qty: 12 CAPSULE | Refills: 0 | Status: SHIPPED | OUTPATIENT
Start: 2021-01-01 | End: 2021-01-01 | Stop reason: SDUPTHER

## 2021-01-01 RX ORDER — LOTEPREDNOL ETABONATE 5 MG/ML
SUSPENSION/ DROPS OPHTHALMIC
COMMUNITY
Start: 2021-01-01

## 2021-01-01 RX ORDER — ONDANSETRON 4 MG/1
4 TABLET, ORALLY DISINTEGRATING ORAL EVERY 8 HOURS PRN
Status: DISCONTINUED | OUTPATIENT
Start: 2021-01-01 | End: 2021-01-01

## 2021-01-01 RX ORDER — CALCIUM CHLORIDE 100 MG/ML
1000 INJECTION INTRAVENOUS; INTRAVENTRICULAR ONCE
Status: COMPLETED | OUTPATIENT
Start: 2021-01-01 | End: 2021-01-01

## 2021-01-01 RX ORDER — NICOTINE POLACRILEX 4 MG
15 LOZENGE BUCCAL PRN
Status: DISCONTINUED | OUTPATIENT
Start: 2021-01-01 | End: 2021-01-01 | Stop reason: HOSPADM

## 2021-01-01 RX ORDER — SODIUM CHLORIDE 0.9 % (FLUSH) 0.9 %
10 SYRINGE (ML) INJECTION EVERY 12 HOURS SCHEDULED
Status: DISCONTINUED | OUTPATIENT
Start: 2021-01-01 | End: 2021-01-01

## 2021-01-01 RX ORDER — LISINOPRIL 5 MG/1
5 TABLET ORAL DAILY
Qty: 90 TABLET | Refills: 1 | Status: SHIPPED | OUTPATIENT
Start: 2021-01-01

## 2021-01-01 RX ORDER — BLOOD SUGAR DIAGNOSTIC
1 STRIP MISCELLANEOUS DAILY
Qty: 100 STRIP | Refills: 5 | Status: SHIPPED | OUTPATIENT
Start: 2021-01-01

## 2021-01-01 RX ORDER — SODIUM CHLORIDE 9 MG/ML
INJECTION, SOLUTION INTRAVENOUS CONTINUOUS
Status: DISCONTINUED | OUTPATIENT
Start: 2021-01-01 | End: 2021-01-01

## 2021-01-01 RX ORDER — EPINEPHRINE 0.1 MG/ML
SYRINGE (ML) INJECTION DAILY PRN
Status: COMPLETED | OUTPATIENT
Start: 2021-01-01 | End: 2021-01-01

## 2021-01-01 RX ORDER — DOPAMINE HYDROCHLORIDE 160 MG/100ML
2-20 INJECTION, SOLUTION INTRAVENOUS CONTINUOUS
Status: DISCONTINUED | OUTPATIENT
Start: 2021-01-01 | End: 2021-01-01

## 2021-01-01 RX ORDER — CARVEDILOL 6.25 MG/1
6.25 TABLET ORAL 2 TIMES DAILY
Qty: 60 TABLET | Refills: 3 | Status: SHIPPED | OUTPATIENT
Start: 2021-01-01 | End: 2021-01-01 | Stop reason: SDUPTHER

## 2021-01-01 RX ORDER — CARVEDILOL 12.5 MG/1
12.5 TABLET ORAL 2 TIMES DAILY
Qty: 180 TABLET | Refills: 0 | Status: SHIPPED | OUTPATIENT
Start: 2021-01-01 | End: 2021-01-01

## 2021-01-01 RX ORDER — CARVEDILOL 12.5 MG/1
TABLET ORAL
Qty: 180 TABLET | Refills: 3 | Status: SHIPPED | OUTPATIENT
Start: 2021-01-01

## 2021-01-01 RX ORDER — SODIUM CHLORIDE 0.9 % (FLUSH) 0.9 %
10 SYRINGE (ML) INJECTION PRN
Status: DISCONTINUED | OUTPATIENT
Start: 2021-01-01 | End: 2021-01-01

## 2021-01-01 RX ORDER — TIMOLOL MALEATE 5 MG/ML
1 SOLUTION/ DROPS OPHTHALMIC 2 TIMES DAILY
Status: DISCONTINUED | OUTPATIENT
Start: 2021-01-01 | End: 2021-01-01

## 2021-01-01 RX ADMIN — HEPARIN SODIUM 5000 UNITS: 5000 INJECTION INTRAVENOUS; SUBCUTANEOUS at 09:24

## 2021-01-01 RX ADMIN — Medication 50 MEQ: at 03:01

## 2021-01-01 RX ADMIN — Medication 0.1 MG: at 02:00

## 2021-01-01 RX ADMIN — PHENYLEPHRINE HYDROCHLORIDE 200 MCG/MIN: 10 INJECTION INTRAVENOUS at 20:29

## 2021-01-01 RX ADMIN — PIPERACILLIN AND TAZOBACTAM 3375 MG: 3; .375 INJECTION, POWDER, LYOPHILIZED, FOR SOLUTION INTRAVENOUS at 01:00

## 2021-01-01 RX ADMIN — SODIUM CHLORIDE: 9 INJECTION, SOLUTION INTRAVENOUS at 06:40

## 2021-01-01 RX ADMIN — Medication 0.1 MG: at 01:50

## 2021-01-01 RX ADMIN — SODIUM CHLORIDE: 9 INJECTION, SOLUTION INTRAVENOUS at 03:00

## 2021-01-01 RX ADMIN — SODIUM CHLORIDE, PRESERVATIVE FREE 10 ML: 5 INJECTION INTRAVENOUS at 09:30

## 2021-01-01 RX ADMIN — DOPAMINE HYDROCHLORIDE 5 MCG/KG/MIN: 160 INJECTION, SOLUTION INTRAVENOUS at 02:20

## 2021-01-01 RX ADMIN — CHLORHEXIDINE GLUCONATE 0.12% ORAL RINSE 15 ML: 1.2 LIQUID ORAL at 20:23

## 2021-01-01 RX ADMIN — IOPAMIDOL 80 ML: 755 INJECTION, SOLUTION INTRAVENOUS at 08:31

## 2021-01-01 RX ADMIN — SODIUM CHLORIDE, PRESERVATIVE FREE 10 ML: 5 INJECTION INTRAVENOUS at 09:20

## 2021-01-01 RX ADMIN — SODIUM BICARBONATE 50 MEQ: 84 INJECTION, SOLUTION INTRAVENOUS at 04:33

## 2021-01-01 RX ADMIN — PHENYLEPHRINE HYDROCHLORIDE 50 MCG/MIN: 10 INJECTION INTRAVENOUS at 11:24

## 2021-01-01 RX ADMIN — CALCIUM CHLORIDE 1000 MG: 100 INJECTION, SOLUTION INTRAVENOUS at 02:07

## 2021-01-01 RX ADMIN — FAMOTIDINE 20 MG: 20 TABLET ORAL at 09:49

## 2021-01-01 RX ADMIN — PIPERACILLIN AND TAZOBACTAM 3375 MG: 3; .375 INJECTION, POWDER, LYOPHILIZED, FOR SOLUTION INTRAVENOUS at 09:18

## 2021-01-01 RX ADMIN — PHENYLEPHRINE HYDROCHLORIDE 155 MCG/MIN: 10 INJECTION INTRAVENOUS at 01:26

## 2021-01-01 RX ADMIN — INSULIN LISPRO 1 UNITS: 100 INJECTION, SOLUTION INTRAVENOUS; SUBCUTANEOUS at 03:55

## 2021-01-01 RX ADMIN — PIPERACILLIN AND TAZOBACTAM 3375 MG: 3; .375 INJECTION, POWDER, LYOPHILIZED, FOR SOLUTION INTRAVENOUS at 17:00

## 2021-01-01 RX ADMIN — ATROPINE SULFATE 0.5 MG: 0.1 INJECTION, SOLUTION ENDOTRACHEAL; INTRAMUSCULAR; INTRAVENOUS; SUBCUTANEOUS at 02:02

## 2021-01-01 RX ADMIN — CALCIUM CHLORIDE 1000 MG: 100 INJECTION, SOLUTION INTRAVENOUS at 02:22

## 2021-01-01 RX ADMIN — HEPARIN SODIUM 5000 UNITS: 5000 INJECTION INTRAVENOUS; SUBCUTANEOUS at 15:09

## 2021-01-01 RX ADMIN — PHENYLEPHRINE HYDROCHLORIDE 50 MCG/MIN: 10 INJECTION INTRAVENOUS at 08:05

## 2021-01-01 RX ADMIN — Medication 0.1 MG: at 01:43

## 2021-01-01 RX ADMIN — SODIUM CHLORIDE 1000 ML: 9 INJECTION, SOLUTION INTRAVENOUS at 04:33

## 2021-01-01 RX ADMIN — SODIUM CHLORIDE, PRESERVATIVE FREE 10 ML: 5 INJECTION INTRAVENOUS at 20:23

## 2021-01-01 RX ADMIN — KIT FOR THE PREPARATION OF TECHNETIUM TC 99M PENTETATE 27.1 MILLICURIE: 20 INJECTION, POWDER, LYOPHILIZED, FOR SOLUTION INTRAVENOUS; RESPIRATORY (INHALATION) at 08:43

## 2021-01-01 RX ADMIN — Medication 50 MEQ: at 02:37

## 2021-01-01 RX ADMIN — CHLORHEXIDINE GLUCONATE 0.12% ORAL RINSE 15 ML: 1.2 LIQUID ORAL at 09:27

## 2021-01-01 RX ADMIN — SODIUM CHLORIDE: 9 INJECTION, SOLUTION INTRAVENOUS at 17:07

## 2021-01-01 RX ADMIN — CALCIUM CHLORIDE 1000 MG: 100 INJECTION INTRAVENOUS; INTRAVENTRICULAR at 02:22

## 2021-01-01 RX ADMIN — HEPARIN SODIUM 5000 UNITS: 5000 INJECTION INTRAVENOUS; SUBCUTANEOUS at 21:10

## 2021-01-01 RX ADMIN — PHENYLEPHRINE HYDROCHLORIDE 125 MCG/MIN: 10 INJECTION INTRAVENOUS at 15:53

## 2021-01-01 RX ADMIN — TIMOLOL MALEATE 1 DROP: 5 SOLUTION OPHTHALMIC at 09:27

## 2021-01-01 RX ADMIN — Medication 20 MCG/MIN: at 05:04

## 2021-01-01 RX ADMIN — PIPERACILLIN AND TAZOBACTAM 3375 MG: 3; .375 INJECTION, POWDER, LYOPHILIZED, FOR SOLUTION INTRAVENOUS at 09:39

## 2021-01-01 RX ADMIN — Medication 30 MCG/MIN: at 19:07

## 2021-01-01 RX ADMIN — Medication 0.1 MG: at 01:53

## 2021-01-01 RX ADMIN — Medication 5 MCG/MIN: at 03:02

## 2021-01-01 RX ADMIN — SODIUM CHLORIDE: 9 INJECTION, SOLUTION INTRAVENOUS at 09:15

## 2021-01-01 RX ADMIN — SODIUM CHLORIDE 1000 ML: 9 INJECTION, SOLUTION INTRAVENOUS at 18:00

## 2021-01-01 RX ADMIN — Medication 100 MEQ: at 02:26

## 2021-01-01 RX ADMIN — HEPARIN SODIUM 5000 UNITS: 5000 INJECTION INTRAVENOUS; SUBCUTANEOUS at 07:00

## 2021-01-01 RX ADMIN — TIMOLOL MALEATE 1 DROP: 5 SOLUTION OPHTHALMIC at 21:10

## 2021-01-01 RX ADMIN — SODIUM CHLORIDE 4.9 UNITS/HR: 9 INJECTION, SOLUTION INTRAVENOUS at 14:04

## 2021-01-01 RX ADMIN — INSULIN LISPRO 1 UNITS: 100 INJECTION, SOLUTION INTRAVENOUS; SUBCUTANEOUS at 09:24

## 2021-01-01 RX ADMIN — Medication 0.1 MG: at 01:57

## 2021-01-01 RX ADMIN — Medication 0.1 MG: at 01:47

## 2021-01-01 SDOH — ECONOMIC STABILITY: FOOD INSECURITY: WITHIN THE PAST 12 MONTHS, THE FOOD YOU BOUGHT JUST DIDN'T LAST AND YOU DIDN'T HAVE MONEY TO GET MORE.: NEVER TRUE

## 2021-01-01 SDOH — ECONOMIC STABILITY: FOOD INSECURITY: WITHIN THE PAST 12 MONTHS, YOU WORRIED THAT YOUR FOOD WOULD RUN OUT BEFORE YOU GOT MONEY TO BUY MORE.: NEVER TRUE

## 2021-01-01 ASSESSMENT — PULMONARY FUNCTION TESTS
PIF_VALUE: 26.1
PIF_VALUE: 28
PIF_VALUE: 30
PIF_VALUE: 28.1
PIF_VALUE: 28.1
PIF_VALUE: 31.8
PIF_VALUE: 32
PIF_VALUE: 30
PIF_VALUE: 30

## 2021-01-01 ASSESSMENT — PATIENT HEALTH QUESTIONNAIRE - PHQ9
1. LITTLE INTEREST OR PLEASURE IN DOING THINGS: 0
2. FEELING DOWN, DEPRESSED OR HOPELESS: 0
SUM OF ALL RESPONSES TO PHQ9 QUESTIONS 1 & 2: 0
SUM OF ALL RESPONSES TO PHQ QUESTIONS 1-9: 0
SUM OF ALL RESPONSES TO PHQ9 QUESTIONS 1 & 2: 0
SUM OF ALL RESPONSES TO PHQ QUESTIONS 1-9: 0

## 2021-01-01 ASSESSMENT — LIFESTYLE VARIABLES
AUDIT-C TOTAL SCORE: INCOMPLETE
HOW OFTEN DO YOU HAVE A DRINK CONTAINING ALCOHOL: 0
HOW OFTEN DO YOU HAVE A DRINK CONTAINING ALCOHOL: NEVER
AUDIT TOTAL SCORE: INCOMPLETE

## 2021-01-01 ASSESSMENT — PAIN DESCRIPTION - PAIN TYPE: TYPE: ACUTE PAIN

## 2021-01-01 ASSESSMENT — PAIN DESCRIPTION - LOCATION: LOCATION: FLANK

## 2021-01-01 ASSESSMENT — ENCOUNTER SYMPTOMS
SHORTNESS OF BREATH: 0
VOMITING: 0
VOMITING: 0
COUGH: 0
DIARRHEA: 0
SINUS PAIN: 0
DIARRHEA: 0
RHINORRHEA: 0
EYE PAIN: 0
COUGH: 0
SHORTNESS OF BREATH: 0
EYE REDNESS: 0
EYES NEGATIVE: 1
WHEEZING: 0
EYE REDNESS: 0
CONSTIPATION: 0
SORE THROAT: 0
NAUSEA: 0
BACK PAIN: 0
ABDOMINAL PAIN: 0
NAUSEA: 0

## 2021-01-01 ASSESSMENT — PAIN - FUNCTIONAL ASSESSMENT: PAIN_FUNCTIONAL_ASSESSMENT: 0-10

## 2021-01-01 ASSESSMENT — PAIN SCALES - GENERAL: PAINLEVEL_OUTOF10: 2

## 2021-01-01 ASSESSMENT — PAIN DESCRIPTION - ORIENTATION: ORIENTATION: LEFT

## 2021-01-01 ASSESSMENT — PAIN DESCRIPTION - DESCRIPTORS: DESCRIPTORS: ACHING

## 2021-01-01 ASSESSMENT — SOCIAL DETERMINANTS OF HEALTH (SDOH): HOW HARD IS IT FOR YOU TO PAY FOR THE VERY BASICS LIKE FOOD, HOUSING, MEDICAL CARE, AND HEATING?: NOT VERY HARD

## 2021-01-08 NOTE — TELEPHONE ENCOUNTER
Ayah Viveros called requesting a refill on the following medications:  Requested Prescriptions     Pending Prescriptions Disp Refills    amLODIPine (NORVASC) 10 MG tablet 30 tablet      Sig: Take 1 tablet by mouth daily    SITagliptin (JANUVIA) 100 MG tablet 30 tablet 2     Sig: Take 1 tablet by mouth daily     Pharmacy verified YES    Date of last visit: 8/26/2020  Date of next visit (if applicable): Visit date not found

## 2021-02-02 PROBLEM — C64.2 RENAL CELL CARCINOMA OF LEFT KIDNEY (HCC): Status: ACTIVE | Noted: 2021-01-01

## 2021-02-02 PROBLEM — E11.21 DIABETIC NEPHROPATHY ASSOCIATED WITH TYPE 2 DIABETES MELLITUS (HCC): Status: ACTIVE | Noted: 2021-01-01

## 2021-02-02 NOTE — PROGRESS NOTES
Patsy Cruz is a 67 y.o. female that presents for     Chief Complaint   Patient presents with    Follow-up     BS running 150's-170's   [address] Maintenance     declined flu vaccine       /80   Pulse 66   Temp 97.5 °F (36.4 °C) (Temporal)   Resp 14   Ht 5' (1.524 m)   Wt 160 lb 9.6 oz (72.8 kg)   SpO2 98%   BMI 31.37 kg/m²       HPI:    Diabetes Type 2    Glucose control:   Does patient check blood glucoses at home? Yes - FBG have been doing better, 140-170  Report of hypoglycemia: no  Lab Results   Component Value Date    LABA1C 7.3 (H) 02/02/2021     No results found for: EAG    Symptoms  Polyuria, Polydipsia or Polyphagia? No  Chest Pain, SOB, or Palpitations? -  No  New Vision complaints? No  Paresthesias of the extremities? No    Medications  Current medication were reviewed. Compliant with medications? yes  Medication side effects? No  On ACE-I or ARB? No  On antiplatelet therapy? Yes  On Statin? Yes    Exercise  Exercise? No  Wt Readings from Last 3 Encounters:   02/02/21 160 lb 9.6 oz (72.8 kg)   11/11/20 160 lb (72.6 kg)   10/29/20 145 lb (65.8 kg)       Diet discipline?:  Low salt, fat, sugar diet? Yes, tries to do well, eats most meals from home, tries to avoid sugars. Doesn't really eat bread anymore. Blood pressure control:  BP Readings from Last 3 Encounters:   02/02/21 134/80   11/11/20 132/78   10/12/20 (!) 149/67       No results found for: Jamee Stein    Lab Results   Component Value Date    [facility] 110 01/17/2017       Recently had partial nephrectomy for renal cell CA. States that she is doing well. Following with urology and nephrology. HTN    Does patient check BP regularly at home? - No  Current Medication regimen - lisinopril, Norvasc  Tolerating medications well? - yes    Shortness of breath or chest pain? No  Headache or visual complaints? No  Neurologic changes like confusion? No  Extremity edema?  Has improved more recently BP Readings from Last 3 Encounters:   02/02/21 134/80   11/11/20 132/78   10/12/20 (!) 149/67         Health Maintenance   Topic Date Due    COVID-19 Vaccine (1 of 2) 06/13/1960    DTaP/Tdap/Td vaccine (1 - Tdap) 06/13/1963    Shingles Vaccine (1 of 2) 06/13/1994    Pneumococcal 65+ years Vaccine (1 of 1 - PPSV23) 06/13/2009    Lipid screen  01/17/2018    Flu vaccine (1) 09/01/2020    Annual Wellness Visit (AWV)  08/27/2021    Potassium monitoring  11/09/2021    Creatinine monitoring  11/09/2021    DEXA (modify frequency per FRAX score)  Completed    Hepatitis C screen  Completed    Hepatitis A vaccine  Aged Out    Hib vaccine  Aged Out    Meningococcal (ACWY) vaccine  Aged Out       Past Medical History:   Diagnosis Date    Cataracts, bilateral     Diabetes mellitus ([facility] [address]     Glaucoma     History of blood transfusion 06/1961    age 15 , s/p leg fracture surgery [facility]    Hyperlipidemia     Hypertension     Nausea & vomiting     PONV (postoperative nausea and vomiting)        Past Surgical History:   Procedure Laterality Date    ABDOMINAL EXPLORATION SURGERY  1996    Adhesions     CHOLECYSTECTOMY  80    CORNEAL TRANSPLANT      HERNIA REPAIR  90    HYSTERECTOMY  89    INCISIONAL HERNIA REPAIR  10/28/2014    laparoscopic incisional herina repair w mesh--Dr. Jose F Joshi    LEG SURGERY Right 1962    FX W/PLATES & REMOVED    PARTIAL NEPHRECTOMY Left 10/8/2020    ROBOTIC LEFT PARTIAL NEPHRECTOMY performed by Yaneli Mendoza MD at [address] [place] [address]  as a child       Social History     Tobacco Use    Smoking status: Never Smoker    Smokeless tobacco: Never Used   Substance Use Topics    Alcohol use: No    Drug use: No       Family History   Problem Relation Age of Onset    Cancer Mother     Heart Disease Father     Diabetes Sister     Diabetes Brother I have reviewed the patient's past medical history, past surgical history, allergies, medications, social and family history and I have made updates where appropriate. Review of Systems           PHYSICAL EXAM:  /80   Pulse 66   Temp 97.5 °F (36.4 °C) (Temporal)   Resp 14   Ht 5' (1.524 m)   Wt 160 lb 9.6 oz (72.8 kg)   SpO2 98%   BMI 31.37 kg/m²     General Appearance: well developed and well- nourished, in no acute distress  Head: normocephalic and atraumatic  ENT: external ear and ear canal normal bilaterally, nose without deformity, nasal mucosa and turbinates normal without polyps, oropharynx normal, dentition is normal for age, no lipor gum lesions noted  Neck: supple and non-tender without mass, no thyromegaly or thyroid nodules, no cervical lymphadenopathy  Pulmonary/Chest: clear to auscultation bilaterally- no wheezes, rales or rhonchi, normal air movement, no respiratory distress or retractions  Cardiovascular: normal rate, regular rhythm, normal S1 and S2, no murmurs, rubs, clicks, or gallops, distal pulses intact  Extremities: no cyanosis, clubbing or edema of the lower extremities  Psych:  Normal affect without evidence of depression oranxiety, insight and judgement are appropriate, memory appears intact  Skin: warm and dry, no rash or erythema      ASSESSMENT & PLAN  Toño Matthews was seen today for follow-up and health maintenance.     Diagnoses and all orders for this visit:    Type 2 diabetes mellitus with other diabetic kidney complication, without long-term current use of insulin (HCC)    Renal cell carcinoma of left kidney (HCC)    Diabetic nephropathy associated with type 2 diabetes mellitus ([facility] [address]    At high risk for falls    Benign essential HTN    Other orders  -     POCT glycosylated hemoglobin (Hb A1C)      -Follow up with nephro and urology regarding the renal cell CA  -Chronic issues are stable, continue current medications  -Advised to call if any issues Return in about 6 months (around 8/2/2021). Controlled Substance Monitoring:    Acute and Chronic Pain Monitoring:   No flowsheet data found. Tamika Shen received counseling on the following healthy behaviors: medication adherence  Reviewed prior labs and health maintenance. Continue current medications, diet and exercise. Discussed use, benefit, and side effects of prescribed medications. Barriers to medication compliance addressed. Patient given educational materials - see patient instructions. All patient questions answered. Patient voiced understanding. On the basis of positive falls risk screening, assessment and plan is as follows: home safety tips provided.

## 2021-03-23 NOTE — PROGRESS NOTES
1996    Adhesions     CHOLECYSTECTOMY  85    CORNEAL TRANSPLANT      HERNIA REPAIR  90    HYSTERECTOMY  [address]  10/28/2014    laparoscopic incisional herina repair w mesh--Dr. Mathias Reasons    LEG SURGERY Right 1962    FX W/PLATES & REMOVED    PARTIAL NEPHRECTOMY Left 10/8/2020    ROBOTIC LEFT PARTIAL NEPHRECTOMY performed by Fran Guerra MD at [facility]  as a child        VITALS:  BP (!) 148/75 (Site: Left Upper Arm, Position: Sitting, Cuff Size: Large Adult)   Pulse 70   Temp 97.1 °F (36.2 °C)   Wt 162 lb (73.5 kg)   SpO2 97%   BMI 31.64 kg/m²   Wt Readings from Last 3 Encounters:   03/23/21 162 lb (73.5 kg)   02/02/21 160 lb 9.6 oz (72.8 kg)   11/11/20 160 lb (72.6 kg)     Body mass index is 31.64 kg/m².      General Appearance: alert and cooperative with exam, appears comfortable, no distress  Oral: moist oral mucus membranes  Neck: No jugular venous distention  Lungs: Air entry B/L, no crackles or rales, no use of accessory muscles  Heart: S1, S2 heard  GI: soft, non-tender, no guarding  Extremities: No sig LE edema     Medications:  Current Outpatient Medications   Medication Sig Dispense Refill    pravastatin (PRAVACHOL) 40 MG tablet Take 1 tablet by mouth nightly 90 tablet 3    amLODIPine (NORVASC) 10 MG tablet Take 1 tablet by mouth daily 90 tablet 3    SITagliptin (JANUVIA) 100 MG tablet Take 1 tablet by mouth daily 90 tablet 3    aspirin 325 MG tablet Take 325 mg by mouth daily      vitamin C (ASCORBIC ACID) 500 MG tablet Take 500 mg by mouth daily      lisinopril (PRINIVIL;ZESTRIL) 20 MG tablet Take 1 tablet by mouth daily 90 tablet 3    glipiZIDE (GLUCOTROL) 10 MG tablet Take 1 tablet by mouth 2 times daily (before meals) 180 tablet 3    lodoxamide (ALOMIDE) 0.1 % ophthalmic solution 1 drop as needed      docusate sodium (COLACE) 100 MG capsule Take 1 capsule by mouth daily as needed for Constipation 30 capsule 0    timolol (TIMOPTIC) 0.5 % ophthalmic solution 1 drop 2 times daily       No current facility-administered medications for this visit. Laboratory & Diagnostics:  Old progress notes from referring physician reviewed. Old labs reviewed: Oct 2014: k 4.3, Creat 0.9  June 2020: UPCR 8 grams/g, Hb AIC 7.8%  24 hr protein 5.5 grams  GOYO: speckled pattern  UA: +300 protein, no blood  JULIET/Complements/Anti GBM: all normal    HbAIC 7.8%  US: B/L renal cysts, Left renal mass 4.4 cm  July 2020: K 4.6, Creat 1.9, Ca 10.2, Albumin 4.2, 24 hr protein 5.5 grams  Aug 2020: creat 1.5, K 4.2, glucose 262, UCR 8.69 g/g, Positive GOYO    Sept 2020; K 5.1, Creat 1.7, Hb 11.6, UPCR 7.93 g/g  Nov 2020: L 4.3, Creat 1.6, UPCR 6.61 g/g  March 2021: K 4.1, Creat 2.0, Hb 11.7, Plt 240, AIC 7.6%     Impression/Plan:   1. Nephrotic range proteinuria: UPCR 7.3 grams/g. 24 hr protein is close to 5 grams. Continue with Lisinopril. Increase lisinopril 20 mg po BID. AIC is improving but still high at 7.6%. Mostly Diabetic nephropathy and severe arterionephrosclerosis. 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 Biopsy report discussed with patient and family member in detail. Her AIC was 9% 3 years ago. She used to weigh over 250 lbs (patient says about 5 years ago). Overall renal function is quite stable but she still has quite a bit of proteinuria. Rarely need for her to improve her sugars further. Advised weight loss. 2. Left renal mass: s/p Left partial nephrectomy (Oct 2020), path c/w Clear cell RCC  3. Positive GOYO: Will be seeing rheumatology  4. DM with proteinuria: Continue with lisinopril for now. Increase dose to 20 mg po BId. Repeat BMP in 2 weeks. 5. CKD III overall stable.  Discussed risks of contrast nephropathy from CT abdomen in detail. Patient will think about it and will inform our office. She does have an exophytic cyst on the right side. Will give IVF to minimize risk. Discussed with patient and family member in detail  10. HTN. Advised low salt diet. On Norvasc and lisinopril  7. Hx Cataracts    Orders Placed This Encounter   Procedures    Basic Metabolic Panel    Protein / creatinine ratio, urine    Basic Metabolic Panel    Hemoglobin A1C    Vitamin D 25 Hydroxy    Lipid, Fasting    Albumin    PTH, Intact    Phosphorus    CBC     Return in about 6 months (around 9/23/2021).   Dinah Cook MD  [facility]

## 2021-03-24 NOTE — TELEPHONE ENCOUNTER
Patient needs to have a CT abd/pelvis with/without IV contrast.  Patient just seen yesterday.   Could you please ask Dr. Mary Burrows if the patient is okay to have IV contrast?  Thank you

## 2021-03-24 NOTE — TELEPHONE ENCOUNTER
This was discussed with family/patient. They were not certain and were going to get back with us   Check with pt and family if they decided yet.  Risk of contrast nephropathy was discussed with patient and family member yesterday

## 2021-04-05 NOTE — TELEPHONE ENCOUNTER
Patient has opted to not have the CT scan done due to the risk of nephropathy. Will send a message to Dr. Candi Ruiz to see if there is anything else he would want to do.

## 2021-04-05 NOTE — TELEPHONE ENCOUNTER
As the patient is unable to have CT contrast.  Dr. Yolonda Mcburney would like to have the patient undergo a MRI abdomen w/wo contrast to further evaluate a renal mass.   Could you please ask Dr. Ari Abdalla if the patient is okay to have MRI contrast?  Thank you

## 2021-04-12 NOTE — TELEPHONE ENCOUNTER
I explained to patient about her GFR, stopping the Lisinopril and repeating the BMP in 1 week. She understood. She said no one told her this and she will get labs done next Tuesday. Wants to get CT done with contrast if possible.

## 2021-04-22 NOTE — TELEPHONE ENCOUNTER
Winnie Frias, patient is coming in on 4/29 with Dr. Sri Iraheta. Do you know what ever happened to this? So are we proceeding with the CT ??? Last BMP done on 3/18/21. Can you please look into this, and get back with me. Thanks!

## 2021-04-23 NOTE — TELEPHONE ENCOUNTER
Patient is willing to accept the risks. Patient did state that you would do hydration orders for her. What would you like to do?

## 2021-04-23 NOTE — TELEPHONE ENCOUNTER
----- Message from Dyan Tai MD sent at 4/23/2021  7:57 AM EDT -----  Contrast MRI is contraindicated with creat 2.1  Creat remains high at 2.1 despite stopping lisinopril    She will have to accept some risks of contrast nephropathy if she wants to proceed with CT contrast   This was all discussed with patient and family member in March office visit  Let me know what patient wants  Call patient.

## 2021-04-23 NOTE — RESULT ENCOUNTER NOTE
Contrast MRI is contraindicated with creat 2.1  Creat remains high at 2.1 despite stopping lisinopril    She will have to accept some risks of contrast nephropathy if she wants to proceed with CT contrast   This was all discussed with patient and family member in March office visit  Let me know what patient wants  Call patient.

## 2021-04-23 NOTE — TELEPHONE ENCOUNTER
I have informed patient about hydration orders and to hold lisinopril for now, and to repeat blood work post CT scan and to increase water intake, she understood. I have routed to urology as well. Orders on desk for signature.

## 2021-04-26 NOTE — TELEPHONE ENCOUNTER
Patient is scheduled for her CT scan on 04- at 2:00pm with an arrival of 1:30pm.  Patient notified and voiced understanding.

## 2021-04-30 NOTE — PROGRESS NOTES
__m__ Safety:       (Environmental)   [place] to environment   Ensure ID band is correct and in place/ allergy band as needed   Assess for fall risk   Initiate fall precautions as applicable (fall band, side rails, etc.)   Call light within reach   Bed in low position/ wheels locked    __m__ Pain:        Assess pain level and characteristics   Administer analgesics as ordered   Assess effectiveness of pain management and report to MD as needed    __m__ Knowledge Deficit:   Assess baseline knowledge   Provide teaching at level of understanding   Provide teaching via preferred learning method   Evaluate teaching effectiveness    _m___ Hemodynamic/Respiratory Status:       (Pre and Post Procedure Monitoring)   Assess/Monitor vital signs and LOC   Assess Baseline SpO2 prior to any sedation   Obtain weight/height   Assess vital signs/ LOC until patient meets discharge criteria   Monitor procedure site and notify MD of any issues    _

## 2021-05-04 NOTE — PROGRESS NOTES
Heber Hogue MD  Urology Clinic Progress Note      Patient:  Daphnie Bolton  YOB: 1945  Date: 5/4/2021    HISTORY OF PRESENT ILLNESS:   The patient is a 67 y.o. female who presents today for follow-up for the following problem(s):      1. Renal mass    2. 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 Renal cyst    4. Nephrolithiasis         Overall the problem(s) : are stable  Associated Symptoms: No dysuria, gross hematuria. Pain Severity:      Summary of old records: renal mass    Additional History: Onset 1 month, incidentally found  Severity is described as moderate to severe. The course of symptoms over time is insidious. Alleviating factors: none  Worsening factors: none  Lower urinary tract symptoms: non bothersome  CKD  Julianna, hysterectomy, hernia    Robotic Laparascopic assisted left side partial nephrectomy 10/2020  Left renal cortical biopsy    FINAL DIAGNOSIS:   A - Tissue from left renal cortex:    Specimen sent to [facility] for evaluation.  Please see separate   reference report. B -  Left kidney mass, partial nephrectomy:    Clear-cell renal cell carcinoma, Grade 1.    Parenchymal margin focally positive for tumor.    pT1a,  pNx     Gross margins negative    I independently reviewed and verified the images and reports from:    Ct Abdomen Pelvis W Wo Contrast Additional Contrast? None    Result Date: 4/30/2021  PROCEDURE: CT ABDOMEN PELVIS W WO CONTRAST CLINICAL INFORMATION: Renal mass . COMPARISON: CT scan of the abdomen and pelvis dated 17 August 2020. TECHNIQUE: 5 mm axial CT images were obtained through the abdomen and pelvis before and after the administration of intravenous and oral contrast. Coronal and sagittal reconstructions were obtained. All CT scans at this facility use dose modulation, iterative reconstruction, and/or weight-based dosing when appropriate to reduce radiation dose to as low as reasonably achievable.  FINDINGS: There is evidence for granulomatous disease in the right lower lobe and right hilum. . There is mitral valve calcification. There are punctate calcifications in the liver and spleen possibly related to old inflammatory disease. . The adrenal glands and pancreas are within acceptable limits. The patient is status post cholecystectomy. . There are postoperative changes involving the left kidney. There is no recurrent mass. There is a 5.7 mm calcification in the left kidney. There is no hydronephrosis. There is a 2.7 x 2.8 cm probable posttraumatic type I cyst arising from the midpole of the right kidney, an 8.6 mm posterior type  I cyst in the right kidney anteriorly and in the amount of posterior type I cyst in the midpole the right kidney posteriorly There is a small hiatal hernia. No abnormalities of the small bowel loops are noted. There is atherosclerotic calcification in the abdominal aorta, iliac arteries and at the origin of the left renal artery. There is no adenopathy. The urinary bladder is normal. The patient is status post hysterectomy. There  is no pelvic free fluid. There is diverticulosis involving the sigmoid colon. There are small mesenteric and inguinal lymph nodes present. There is a mild dextroscoliosis and degenerative changes in the lumbar spine. .      1. Status post removal of the left renal mass with no evidence of recurrent or residual tumor 2. 5.7 for calcification in the left kidney. No associated hydronephrosis. 3. Probable Bosniak type I cyst in the right kidney measuring 2.7 x 2.8 cm, 8.6 mm and 11 mm in size. 4. Punctate calcifications in the liver and spleen possibly related to old inflammatory disease. 5. Status post cholecystectomy and hysterectomy. 6. Atherosclerotic calcification abdominal aorta, iliac arteries and a portion of the left renal artery. 7. Mild dextroscoliosis and degenerative changes in the lumbar spine. 8. Diverticulosis involving the sigmoid colon.  9. Small mesenteric and inguinal lymph nodes. 10. Small hiatal hernia. 11. Mitral valve calcification. 12. Old granulomatous disease in the right lower lobe and right hilum. . **This report has been created using voice recognition software. It may contain minor errors which are inherent in voice recognition technology. ** Final report electronically signed by DR Tracy Strong on 4/30/2021 11:18 AM        Imaging Reviewed during this Office Visit:   (results were independently reviewed by physician and radiology report verified)    Urinalysis today:  No results found for this visit on 05/04/21.     Last BUN and creatinine:  Lab Results   Component Value Date    BUN 39 (H) 04/22/2021     Lab Results   Component Value Date    CREATININE 2.1 (H) 04/22/2021       PAST MEDICAL, FAMILY AND SOCIAL HISTORY UPDATE:  Past Medical History:   Diagnosis Date    Cataracts, bilateral     Diabetes mellitus ([facility] [address]     Glaucoma     History of blood transfusion 06/1961    age 15 , s/p leg fracture surgery [facility]    Hyperlipidemia     Hypertension     Nausea & vomiting     PONV (postoperative nausea and vomiting)      Past Surgical History:   Procedure Laterality Date    ABDOMINAL EXPLORATION SURGERY  1996    Adhesions     CHOLECYSTECTOMY  80    CORNEAL TRANSPLANT      HERNIA REPAIR  90    HYSTERECTOMY  89    INCISIONAL HERNIA REPAIR  10/28/2014    laparoscopic incisional herina repair w mesh--Dr. Harkins [place]    LEG SURGERY Right 1962    FX W/PLATES & REMOVED    PARTIAL NEPHRECTOMY Left 10/8/2020    ROBOTIC LEFT PARTIAL NEPHRECTOMY performed by Nic Sin MD at [facility]  as a child     Family History   Problem Relation Age of Onset    Cancer Mother     Heart Disease Father     Diabetes Sister     Diabetes Brother      Outpatient Medications Marked as Taking for the 5/4/21 encounter (Office Visit) with Nic Sin MD   Medication Sig Dispense Refill    blood glucose test strips (GLUCOSE METER TEST) strip 1 each by In Vitro route daily One Touch Ultra Blue Test Strips Lifescan, Dx E11.9 100 strip 5    pravastatin (PRAVACHOL) 40 MG tablet Take 1 tablet by mouth nightly 90 tablet 3    amLODIPine (NORVASC) 10 MG tablet Take 1 tablet by mouth daily 90 tablet 3    SITagliptin (JANUVIA) 100 MG tablet Take 1 tablet by mouth daily 90 tablet 3    aspirin 325 MG tablet Take 325 mg by mouth daily      vitamin C (ASCORBIC ACID) 500 MG tablet Take 500 mg by mouth daily      glipiZIDE (GLUCOTROL) 10 MG tablet Take 1 tablet by mouth 2 times daily (before meals) 180 tablet 3    lodoxamide (ALOMIDE) 0.1 % ophthalmic solution 1 drop as needed      docusate sodium (COLACE) 100 MG capsule Take 1 capsule by mouth daily as needed for Constipation 30 capsule 0    timolol (TIMOPTIC) 0.5 % ophthalmic solution 1 drop 2 times daily         Relafen [nabumetone] and Sulfa antibiotics  Social History     Tobacco Use   Smoking Status Never Smoker   Smokeless Tobacco Never Used       Social History     Substance and Sexual Activity   Alcohol Use No       REVIEW OF SYSTEMS:  Constitutional: negative  Eyes: negative  Respiratory: negative  Cardiovascular: negative  Gastrointestinal: negative  Genitourinary: negative except for what is in HPI  Musculoskeletal: negative  Skin: negative   Neurological: negative  Hematological/Lymphatic: negative  Psychological: negative    Physical Exam:      Vitals:    05/04/21 1035   BP: (!) 180/70     Patient is a 67 y.o. female in no acute distress and alert and oriented to person, place and time. NAD, Alert  Non labored respiration  Normal peripheral pulses  Soft, non tender  Skin-warm and dry  Psych- normal mood and affect    Assessment and Plan      1. Renal mass    2. 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 Renal cyst    4.  Nephrolithiasis           Plan:        Repeat CT with contrast in 6 months for surveillance  Left kidney stone: asymptomatic, will observe for now, ESWL in future               MD Rosa Rabago

## 2021-05-12 NOTE — TELEPHONE ENCOUNTER
Patient called and was asking if she is still to continue to hold lisinopril? BP is running 150's-160's /70's.      Please Advise

## 2021-08-02 NOTE — PROGRESS NOTES
Rex Medrano is a 67 y.o. female that presents for     Chief Complaint   Patient presents with    Abdominal Pain       Wt 178 lb (80.7 kg)   BMI 33.63 kg/m²       HPI    Notes left sided abdominal mass for at least the past week. Does have a hx of multiple abdominal surgeries. Notes some aching, but more uncomfortable than anything. Does not feel reducible. No N/V, constipation.         Health Maintenance   Topic Date Due    COVID-19 Vaccine (1) Never done    Shingles Vaccine (1 of 2) Never done    Pneumococcal 65+ years Vaccine (1 of 1 - PPSV23) Never done    DTaP/Tdap/Td vaccine (1 - Tdap) 03/09/2017    Lipid screen  01/17/2018    Annual Wellness Visit (AWV)  08/27/2021    Flu vaccine (1) 09/01/2021    Potassium monitoring  05/17/2022    Creatinine monitoring  05/17/2022    DEXA (modify frequency per FRAX score)  Completed    Hepatitis C screen  Completed    Hepatitis A vaccine  Aged Out    Hib vaccine  Aged Out    Meningococcal (ACWY) vaccine  Aged Out       Past Medical History:   Diagnosis Date    Cataracts, bilateral     Diabetes mellitus ([address]     Glaucoma     History of blood transfusion 06/1961    age 15 , s/p leg fracture surgery [facility]    Hyperlipidemia     Hypertension     Nausea & vomiting     PONV (postoperative nausea and vomiting)        Past Surgical History:   Procedure Laterality Date    ABDOMINAL EXPLORATION SURGERY  1996    Adhesions     CHOLECYSTECTOMY  80    CORNEAL TRANSPLANT      HERNIA REPAIR  90    HYSTERECTOMY  89    INCISIONAL HERNIA REPAIR  10/28/2014    laparoscopic incisional herina repair w mesh--Dr. Smith Radulises    LEG SURGERY Right 1962    FX W/PLATES & REMOVED    PARTIAL NEPHRECTOMY Left 10/8/2020    ROBOTIC LEFT PARTIAL NEPHRECTOMY performed by Julieth Decker MD at [facility]  as a child       Social History     Tobacco Use    Smoking status: Never Smoker    Smokeless tobacco: Never Used   Vaping Use    Vaping Use: Never used Substance Use Topics    Alcohol use: No    Drug use: No       Family History   Problem Relation Age of Onset    Cancer Mother     Heart Disease Father     Diabetes Sister     Diabetes Brother          I have reviewed the patient's past medical history, past surgical history, allergies, medications, social and family history and I have made updates where appropriate. Review of Systems        PHYSICAL EXAM:  Wt 178 lb (80.7 kg)   BMI 33.63 kg/m²     Physical Exam  Vitals and nursing note reviewed. Constitutional:       General: She is not in acute distress. Appearance: She is well-developed. HENT:      Head: Normocephalic and atraumatic. Right Ear: Hearing and external ear normal.      Left Ear: Hearing and external ear normal.      Nose: Nose normal.   Eyes:      General:         Right eye: No discharge. Left eye: No discharge. Conjunctiva/sclera: Conjunctivae normal.   Neck:      Thyroid: No thyromegaly. Trachea: No tracheal deviation. Cardiovascular:      Rate and Rhythm: Normal rate and regular rhythm. Heart sounds: Normal heart sounds. No murmur heard. No friction rub. No gallop. Pulmonary:      Effort: Pulmonary effort is normal. No respiratory distress. Breath sounds: No wheezing or rales. Chest:      Chest wall: No tenderness. Abdominal:      Palpations: Abdomen is soft. There is mass (ill defined reducible mass of the left flank, no overlying skin discoloration). Tenderness: There is no abdominal tenderness. There is no guarding or rebound. Musculoskeletal:         General: No deformity. Cervical back: Normal range of motion and neck supple. Lymphadenopathy:      Cervical: No cervical adenopathy. Skin:     General: Skin is warm and dry. Findings: No erythema or rash. Neurological:      Mental Status: She is alert. Motor: No abnormal muscle tone.       Coordination: Coordination normal.   Psychiatric:         Behavior: Behavior normal.         Thought Content: Thought content normal.         Judgment: Judgment normal.             ASSESSMENT & PLAN  Demetrio Neevs was seen today for abdominal pain. Diagnoses and all orders for this visit:    Left lower quadrant abdominal mass  -     CT ABDOMEN PELVIS WO CONTRAST Additional Contrast? None; Future    -Unclear etiology of sx, will start with a CT scan to further evaluate  -Patient advised to contact our office if sx persisting or worsening      Return if symptoms worsen or fail to improve. The most recent results of the following tests were reviewed with the patient today: CT Scan of abd/pelvis     All copied or forwarded information in the progress note was verified by me to be accurate at the time of visit  Patient's past medical, surgical, social and family history were reviewed and updated     All patient questions answered. Patient voiced understanding.

## 2021-08-02 NOTE — TELEPHONE ENCOUNTER
Patient sees Dr. Mabel Domínguez and needs clearance can we get a recent bmp? Jessica Mnazo called looking for clearance.

## 2021-08-02 NOTE — TELEPHONE ENCOUNTER
[facility] scheduling called for clearance for CT scan. Lm with Gema Lira at urology regarding needing recent bmp before Dr. Leslie David can advise or clear for CT scan.

## 2021-08-03 NOTE — TELEPHONE ENCOUNTER
BMP ordered . Have her get it today or tomorrow.   Also have them reach out to Dr. Kylee Lehman office for clearance after she gets it drawn  Electronically signed by KEHINDE Swenson CNP on 8/3/2021 at 8:12 AM

## 2021-08-03 NOTE — TELEPHONE ENCOUNTER
CT was ordered without contrast, but Simin Rehman ordered a BMP she should have it drawn today or tomorrow.

## 2021-08-06 NOTE — ED NOTES
Patient presents to the ED for having abnormal labs.  She states that she had lab work drawn today and her doctor told her to come to the ED because her kidney function was abnormal.      Getachew Oliva LPN  65/[id number]

## 2021-08-06 NOTE — ED PROVIDER NOTES
[place] ENCOUNTER          Pt Name: Rex Medrano  MRN: [id number]  [place] 1945  Date of evaluation: 8/6/2021  Treating Resident Physician: Bhavna Vivar MD  Supervising Physician: Dr. Radha Antonio       Chief Complaint   Patient presents with    Other     abnormal labs     History obtained from the patient. HISTORY OF PRESENT ILLNESS    HPI  Rex Medrano is a 67 y.o. female with past medical history of colitis, diabetes mellitus, hypertension, hyperlipidemia, left partial nephrectomy, cholecystectomy, hysterectomy who presents to the emergency department for evaluation of elevated creatinine found by her PCP. Patient has a history of a left partial nephrectomy is coming in for a left-sided flank mass has been present and growing progressively over the past 2 weeks. On laboratory studies performed showed elevated creatinine of 3 elevated from her typical baseline of 2. She was sent in by primary care physician for further evaluation as well as to receive fluids. She also has an outpatient CT scan of the abdomen pelvis ordered by her PCP and was sent in for the imaging performed earlier than later. She denies any nausea vomiting abdominal pain, diarrhea, blood in stool, fever, chills, cough, shortness of breath, chest pain. The patient has no other acute complaints at this time. REVIEW OF SYSTEMS   Review of Systems   Constitutional: Negative for chills and fever. HENT: Negative for rhinorrhea, sinus pain and sore throat. Eyes: Negative for redness. Respiratory: Negative for cough and shortness of breath. Cardiovascular: Negative for chest pain. Gastrointestinal: Negative for abdominal pain, diarrhea, nausea and vomiting. Left flank abdominal mass   Genitourinary: Positive for flank pain. Negative for dysuria and hematuria. Musculoskeletal: Negative for back pain.    Skin: Negative for rash. Neurological: Negative for light-headedness and headaches. Psychiatric/Behavioral: Negative for agitation.          PAST MEDICAL AND SURGICAL HISTORY     Past Medical History:   Diagnosis Date    Cataracts, bilateral     Diabetes mellitus ([address]     Glaucoma     History of blood transfusion 06/1961    age 15 , s/p leg fracture surgery [facility]    Hyperlipidemia     Hypertension     Nausea & vomiting     PONV (postoperative nausea and vomiting)      Past Surgical History:   Procedure Laterality Date    ABDOMINAL EXPLORATION SURGERY  1996    Adhesions     CHOLECYSTECTOMY  85    CORNEAL TRANSPLANT      HERNIA REPAIR  90    HYSTERECTOMY  89    INCISIONAL HERNIA REPAIR  10/28/2014    laparoscopic incisional herina repair w mesh--Dr. Caity Bishop    LEG SURGERY Right 1962    FX W/PLATES & REMOVED    PARTIAL NEPHRECTOMY Left 10/8/2020    ROBOTIC LEFT PARTIAL NEPHRECTOMY performed by Lorna Franco MD at [place] [address]  as a child         MEDICATIONS     Current Facility-Administered Medications:     0.9 % sodium chloride bolus, 1,000 mL, Intravenous, Once, Josue Antonio MD, Last Rate: 1,000 mL/hr at 08/06/21 1800, 1,000 mL at 08/06/21 1800    Current Outpatient Medications:     carvedilol (COREG) 3.125 MG tablet, Take 1 tablet by mouth 2 times daily, Disp: 60 tablet, Rfl: 3    blood glucose test strips (GLUCOSE METER TEST) strip, 1 each by In Vitro route daily One Touch Ultra Blue Test Strips [employer], Dx E11.9, Disp: 100 strip, Rfl: 5    lisinopril (PRINIVIL;ZESTRIL) 20 MG tablet, Take 1 tablet by mouth 2 times daily (Patient not taking: Reported on 5/4/2021), Disp: 180 tablet, Rfl: 1    pravastatin (PRAVACHOL) 40 MG tablet, Take 1 tablet by mouth nightly, Disp: 90 tablet, Rfl: 3    amLODIPine (NORVASC) 10 MG tablet, Take 1 tablet by mouth daily, Disp: 90 tablet, Rfl: 3    SITagliptin (JANUVIA) 100 MG tablet, Take 1 tablet by mouth daily, Disp: 90 tablet, Rfl: 3    aspirin 325 MG tablet, Take 325 mg by mouth daily, Disp: , Rfl:     vitamin C (ASCORBIC ACID) 500 MG tablet, Take 500 mg by mouth daily, Disp: , Rfl:     glipiZIDE (GLUCOTROL) 10 MG tablet, Take 1 tablet by mouth 2 times daily (before meals), Disp: 180 tablet, Rfl: 3    lodoxamide (ALOMIDE) 0.1 % ophthalmic solution, 1 drop as needed, Disp: , Rfl:     docusate sodium (COLACE) 100 MG capsule, Take 1 capsule by mouth daily as needed for Constipation, Disp: 30 capsule, Rfl: 0    timolol (TIMOPTIC) 0.5 % ophthalmic solution, 1 drop 2 times daily, Disp: , Rfl:       SOCIAL HISTORY     Social History     Social History Narrative    Not on file     Social History     Tobacco Use    Smoking status: Never Smoker    Smokeless tobacco: Never Used   Vaping Use    Vaping Use: Never used   Substance Use Topics    Alcohol use: No    Drug use: No         ALLERGIES     Allergies   Allergen Reactions    Relafen [Nabumetone] Nausea Only    Sulfa Antibiotics Nausea Only         FAMILY HISTORY     Family History   Problem Relation Age of Onset    Cancer Mother     Heart Disease Father     Diabetes Sister     Diabetes Brother          PREVIOUS RECORDS   Previous records reviewed: Patient was seen by Dr. Louis Swann on 10/8/2020 for oncology due to a left renal mass. PHYSICAL EXAM     ED Triage Vitals   BP Temp Temp src Pulse Resp SpO2 Height Weight   -- -- -- -- -- -- -- --     Initial vital signs and nursing assessment reviewed and normal. Pulsoximetry is normal per my interpretation. Additional Vital Signs:  Vitals:    08/06/21 1715   BP: (!) 165/66   Pulse: 79   Resp: 19   Temp:    SpO2: 96%       Physical Exam  Vitals and nursing note reviewed. Constitutional:       General: She is not in acute distress. Appearance: Normal appearance. She is not toxic-appearing. HENT:      Head: Normocephalic and atraumatic.       Right Ear: External ear normal.      Left Ear: External ear normal.      Nose: Nose normal. Mouth/Throat:      Mouth: Mucous membranes are moist.      Pharynx: Oropharynx is clear. Eyes:      General: No scleral icterus. Conjunctiva/sclera: Conjunctivae normal.   Cardiovascular:      Rate and Rhythm: Normal rate and regular rhythm. Pulses: Normal pulses. Heart sounds: Normal heart sounds. Pulmonary:      Effort: Pulmonary effort is normal. No respiratory distress. Breath sounds: Normal breath sounds. Abdominal:      General: Abdomen is flat. There is no distension. Palpations: Abdomen is soft. Tenderness: There is no abdominal tenderness. There is no right CVA tenderness, left CVA tenderness, guarding or rebound. Comments: Patient has a large left-sided flank mass that is mobile soft nontender to palpation. Musculoskeletal:         General: Normal range of motion. Cervical back: Normal range of motion and neck supple. No rigidity. No muscular tenderness. Lymphadenopathy:      Cervical: No cervical adenopathy. Skin:     General: Skin is warm and dry. Capillary Refill: Capillary refill takes less than 2 seconds. Coloration: Skin is not jaundiced. Neurological:      General: No focal deficit present. Mental Status: She is alert and oriented to person, place, and time. Psychiatric:         Mood and Affect: Mood normal.         Behavior: Behavior normal.           MEDICAL DECISION MAKING   Initial Assessment: This is a 58-year-old female with a left partial nephrectomy after a left renal mass was found with a recent bump in her creatinine from her baseline was sent in for further evaluation and hydration. She also had an outpatient CT scan ordered by her primary care physician and was sent in to have this earlier. Has no current abdominal pain, nausea, vomiting, dysuria, hematuria. Differential Diagnosis Included but not limited to:  RPEETHI on CKD, electrolyte derangement, metabolic derangement, lipoma, renal mass, renal cyst    MDM: Patient CT shows some anasarca some renal assessment no acute infectious process. She is doing well denies having any pain. She will finish her liter bolus of fluids and she will be discharged to follow-up with her nephrologist as well as her primary care physician. ED RESULTS   Laboratory results:  Labs Reviewed   CBC WITH AUTO DIFFERENTIAL - Abnormal; Notable for the following components:       Result Value    RBC 3.53 (*)     Hemoglobin 10.0 (*)     Hematocrit 32.5 (*)     MCHC 30.8 (*)     Lymphocytes Absolute 0.8 (*)     All other components within normal limits   BASIC METABOLIC PANEL W/ REFLEX TO MG FOR LOW K - Abnormal; Notable for the following components:    Glucose 251 (*)     BUN 31 (*)     CREATININE 3.1 (*)     All other components within normal limits   GLOMERULAR FILTRATION RATE, ESTIMATED - Abnormal; Notable for the following components:    Est, Glom Filt Rate 15 (*)     All other components within normal limits   MAGNESIUM   ANION GAP   OSMOLALITY       Radiologic studies results:  CT ABDOMEN PELVIS WO CONTRAST Additional Contrast? None   Final Result      1. There are numerous enlarged lymph nodes in the central mesentery with haziness throughout the adjacent fat. This may be on the basis of an infectious or inflammatory process. 2. Diffuse haziness throughout the superficial subcutaneous soft tissues which could be due to anasarca. 3. Postsurgical changes from partial left nephrectomy. There is a nonobstructive calculus in the left kidney which measures 6 mm. There are several cysts arising from the right kidney. 4. Colonic diverticulosis without evidence of acute diverticulitis. **This report has been created using voice recognition software. It may contain minor errors which are inherent in voice recognition technology. **      Final report electronically signed by Dr Fifi Redmond on 8/6/2021 6:04 PM          ED Medications administered this visit: Medications   0.9 % sodium chloride bolus (1,000 mLs Intravenous New Bag 8/6/21 1800)         ED COURSE     ED Course as of Aug 06 1821   Fri Aug 06, 2021   1752 Creatinine(!!): 3.1 [AL]   1752 BUN(!): 31 [AL]   1752 Glucose(!): 251 [AL]   1752 Magnesium: 2.0 [AL]   1752 WBC: 7.9 [AL]   1752 Hemoglobin Quant(!): 10.0 [AL]   1752 Hematocrit(!): 32.5 [AL]   1752 Platelet Count: 048 [AL]   1808 \"IMPRESSION:     1. There are numerous enlarged lymph nodes in the central mesentery with haziness throughout the adjacent fat. This may be on the basis of an infectious or inflammatory process. 2. Diffuse haziness throughout the superficial subcutaneous soft tissues which could be due to anasarca. 3. Postsurgical changes from partial left nephrectomy. There is a nonobstructive calculus in the left kidney which measures 6 mm. There are several cysts arising from the right kidney. 4. Colonic diverticulosis without evidence of acute diverticulitis. \"   CT ABDOMEN PELVIS WO CONTRAST Additional Contrast? None [AL]   9343 Patient was updated on lab results of his imaging results. She will be discharged to follow-up with her primary care physician as well as her nephrologist next 2 days. She will be discharged after patient for a liter bolus of fluids. Recent echo shows an EF of 60%. [AL]      ED Course User Index  [AL] Josue Antonio MD     Strict return precautions and follow up instructions were discussed with the patient prior to discharge, with which the patient agrees. MEDICATION CHANGES     New Prescriptions    No medications on file         FINAL DISPOSITION     Final diagnoses:   Acute kidney injury superimposed on CKD ([facility] [address]     Condition: condition: good  Dispo: Discharge to home      This transcription was electronically signed.  Parts of this transcriptions may have been dictated by use of voice recognition software and electronically transcribed, and parts may have been transcribed with the assistance of an ED scribe. The transcription may contain errors not detected in proofreading. Please refer to my supervising physician's documentation if my documentation differs.     Electronically Signed: Timoteo Lin MD, 08/06/21, 6:21 PM         Timoteo Lin MD  Resident  08/06/21 [id number]

## 2021-08-06 NOTE — ED NOTES
Patient also states that she has a mass on her left flank area. She denies having pain to the area but says that the area is just achy.       Maria Luisa Bellamy, PAMELLAN  56/43/74 [id number]

## 2021-08-09 NOTE — TELEPHONE ENCOUNTER
Julissa Garcia called this morning. Her creatinine is up to 3.1 and Dr. Augie Greer wanted her to be seen. She had a CT ab and pelvis done 8/6/21.

## 2021-08-09 NOTE — PROGRESS NOTES
[address] [facility]  [address]  [facility] [address]  Dept: [phone]  Loc: [phone]  Office Progress Note  8/9/2021 4:07 PM      Pt Name:    Ashvin Patino  YOB: 1945  Primary Care Physician:  Jackelin Perez DO     Chief Complaint:   Chief Complaint   Patient presents with    Follow-up     PREETHI on CKD, lower extremity swelling, HTN        Background Information/Interval History:   The patient is a 67 y.o. pleasant white female with hx DM since 1996, HTN, hx renal mass and proteinuria. She had corneal transplant Right eye 2017. She has previously taken ibuprofen as needed for left knee pain but not regularly. Patient has history of diabetes for several years. Her sugars in the past have been in the range of 300. As part of her evaluation of proteinuria patient was noted to have incidental finding of left renal mass. She underwent partial nephrectomy pathology of which was consistent with clear-cell renal cell carcinoma. Due to nephrotic proteinuria patient was also biopsied. Her AIC was 9% in 2017. Was 8.3% in Aug 2020    Was sent by PCP office due to leg swelling and worsening renal function. Creat was 2.0 and now upto 3.1. Had CT without contrast recently which showed some anasarca but also showed some LN. Was evaluated by ED and also scheduled to see PCP later this week. Sugars are in 190 range at times. Gained about 10 lbs. No nausea or vomiting. No diarrhea. Bp is high in 180s. She did not keep her rheum appt. She rescheduled for next month. Also has been eating at [id number] [place] [address].         Past History:  Past Medical History:   Diagnosis Date    Cataracts, bilateral     Diabetes mellitus ([facility] [address]     Glaucoma     History of blood transfusion 06/1961    age 15 , s/p leg fracture surgery LMH    Hyperlipidemia     Hypertension     Nausea & vomiting     PONV (postoperative nausea and vomiting)  glipiZIDE (GLUCOTROL) 10 MG tablet Take 1 tablet by mouth 2 times daily (before meals) 180 tablet 3    lodoxamide (ALOMIDE) 0.1 % ophthalmic solution 1 drop as needed      timolol (TIMOPTIC) 0.5 % ophthalmic solution 1 drop 2 times daily       No current facility-administered medications for this visit. Laboratory & Diagnostics:  Old progress notes from referring physician reviewed. Old labs reviewed: Oct 2014: k 4.3, Creat 0.9  June 2020: UPCR 8 grams/g, Hb AIC 7.8%  24 hr protein 5.5 grams  GOYO: speckled pattern  UA: +300 protein, no blood  JULIET/Complements/Anti GBM: all normal    HbAIC 7.8%  US: B/L renal cysts, Left renal mass 4.4 cm  July 2020: K 4.6, Creat 1.9, Ca 10.2, Albumin 4.2, 24 hr protein 5.5 grams  Aug 2020: creat 1.5, K 4.2, glucose 262, UCR 8.69 g/g, Positive GOYO    Sept 2020; K 5.1, Creat 1.7, Hb 11.6, UPCR 7.93 g/g  Nov 2020: L 4.3, Creat 1.6, UPCR 6.61 g/g  March 2021: K 4.1, Creat 2.0, Hb 11.7, Plt 240, AIC 7.6%  Aug 2021; K 4.6, Creat 3.1, Ca 9.2, Hb 10       Impression/Plan:   1. Progressive CKD likely vs PREETHI on CKD: has long standing hx of DM and HTN. Has hx biopsy proven diabetic nephropathy. Will hold lisinopril for now. Will start Bumex 2 mg daily. Repeat BMP. Check albumin level. Advised low-salt and fluid restricted diet. Discussed with patient. Previous biopsy showed diabetic nephropathy changes. However there was some concern for possible primary FSGS. Biopsy could not confirm. 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 For now need to optimize fluid status. As stated above. Start Bumex. Monitor renal function. 2. HTN: on norvasc. Blood pressure suboptimally controlled. Lisinopril on hold for now due to PREETHI.   Increase coreg 6.25 mg po BID. advised patient to monitor blood pressure and call me with some readings. 3. Nephrotic range proteinuria  4. DM nephropathy  5. Anasarca. As above  6. Left renal mass: s/p Left partial nephrectomy (Oct 2020), path c/w Clear cell RCC  7. Positive GOYO: Will be seeing rheumatology. Patient rescheduled appointment. 8. Hx Cataracts    Pt advised to call me by Thursday with some BP readings. Orders Placed This Encounter   Procedures    Culture, Urine    Comprehensive Metabolic Panel    CBC    Urinalysis    Protein / creatinine ratio, urine    Vitamin D 25 Hydroxy    PTH, Intact    Phosphorus     Return in about 1 week (around 8/16/2021).       Mariam Ham MD  [facility] no

## 2021-08-10 NOTE — TELEPHONE ENCOUNTER
----- Message from Welby Rafael sent at 8/10/2021 10:01 AM EDT -----  Subject: Appointment Request    Reason for Call: Routine Existing Condition Follow Up    QUESTIONS  Type of Appointment? Established Patient  Reason for appointment request? No appointments available during search  Additional Information for Provider? Pt was told to follow up about lymph   nodes that showed up on CT scan from the ED visit on 08/06. Please advise. Thanks  ---------------------------------------------------------------------------  --------------  Margo REHMAN  What is the best way for the office to contact you? OK to leave message on   voicemail  Preferred Call Back Phone Number? [phone]  ---------------------------------------------------------------------------  --------------  SCRIPT ANSWERS  Relationship to Patient? Self  (Is the patient requesting to be seen urgently for their symptoms?)? No  Is this follow up request related to routine Diabetes Management? No  Are you having any new concerns about your existing condition? No  Have you been diagnosed with, awaiting test results for, or told that you   are suspected of having COVID-19 (Coronavirus)? (If patient has tested   negative or was tested as a requirement for work, school, or travel and   not based on symptoms, answer no)? No  Do you currently have flu-like symptoms including fever or chills, cough,   shortness of breath, difficulty breathing, or new loss of taste or smell? No  Have you had close contact with someone with COVID-19 in the last 14 days? No  (Service Expert  click yes below to proceed with [employer] As Usual   Scheduling)?  Yes

## 2021-08-16 NOTE — PROGRESS NOTES
[address] [facility]  [address]  [facility] [address]  Dept: [phone]  Loc: [phone]  Office Progress Note  8/16/2021 1:44 PM      Pt Name:    Marylee Elam  YOB: 1945  Primary Care Physician:  Kalie Duncan DO     Chief Complaint:   Chief Complaint   Patient presents with    Follow-up     PREETHI on CKD, lower extremity swelling, HTN        Background Information/Interval History:   The patient is a 67 y.o. pleasant white female with hx DM since 1996, HTN, hx renal mass and proteinuria. She had corneal transplant Right eye 2017. She has previously taken ibuprofen as needed for left knee pain but not regularly. Patient has history of diabetes for several years. Her sugars in the past have been in the range of 300. As part of her evaluation of proteinuria patient was noted to have incidental finding of left renal mass. She underwent partial nephrectomy pathology of which was consistent with clear-cell renal cell carcinoma. Due to nephrotic proteinuria patient was also biopsied. Her AIC was 9% in 2017. Was 8.3% in Aug 2020. I saw her last week due to leg swelling. CT showed some anasarca but also showed some LN. She was started on bumex. She is here for 1 week follow-up. Says sugars was 101 this morning, BP was in 140s. BP in office is 167. Reports improvement in leg swelling. Reports improvement in abd swelling. She is seeing PCP this week. Sugar was 180 last night- she says she had wedding cupcake.         Past History:  Past Medical History:   Diagnosis Date    Cataracts, bilateral     Diabetes mellitus ([facility] [address]     Glaucoma     History of blood transfusion 06/1961    age 15 , s/p leg fracture surgery [facility]    Hyperlipidemia     Hypertension     Nausea & vomiting     PONV (postoperative nausea and vomiting)      Past Surgical History:   Procedure Laterality Date   [address] Adhesions     CHOLECYSTECTOMY  85    CORNEAL TRANSPLANT      HERNIA REPAIR  90    HYSTERECTOMY  89   [facility]  10/28/2014    laparoscopic incisional herina repair w mesh--Dr. Mark Moralez    LEG SURGERY Right 1962    FX W/PLATES & REMOVED    PARTIAL NEPHRECTOMY Left 10/8/2020    ROBOTIC LEFT PARTIAL NEPHRECTOMY performed by Enrique Quick MD at [address]  as a child        VITALS:  BP (!) 167/71 (Site: Left Upper Arm, Position: Sitting, Cuff Size: Medium Adult)   Pulse 76   Temp 98.1 °F (36.7 °C)   Wt 171 lb (77.6 kg)   SpO2 96%   BMI 33.40 kg/m²   Wt Readings from Last 3 Encounters:   08/16/21 171 lb (77.6 kg)   08/09/21 176 lb (79.8 kg)   08/06/21 170 lb (77.1 kg)     Body mass index is 33.4 kg/m².      General Appearance: alert and cooperative with exam, appears comfortable, no distress  Oral: moist oral mucus membranes  Neck: No jugular venous distention  Lungs: Air entry B/L, no crackles or rales, no use of accessory muscles  Heart: S1, S2 heard  GI: soft, +abd wall edema, much improved  Extremities: B/L 1+ edema     Medications:  Current Outpatient Medications   Medication Sig Dispense Refill    Vitamin D (CHOLECALCIFEROL) 25 MCG (1000 UT) TABS tablet Take 1,000 Units by mouth daily      bumetanide (BUMEX) 2 MG tablet Take 1 tablet by mouth daily 90 tablet 3    carvedilol (COREG) 6.25 MG tablet Take 1 tablet by mouth 2 times daily 60 tablet 3    blood glucose test strips (GLUCOSE METER TEST) strip 1 each by In Vitro route daily One Touch Ultra Blue Test Strips Lifescan, Dx E11.9 100 strip 5    pravastatin (PRAVACHOL) 40 MG tablet Take 1 tablet by mouth nightly 90 tablet 3    amLODIPine (NORVASC) 10 MG tablet Take 1 tablet by mouth daily 90 tablet 3    SITagliptin (JANUVIA) 100 MG tablet Take 1 tablet by mouth daily 90 tablet 3    aspirin 325 MG tablet Take 325 mg by mouth daily      vitamin C (ASCORBIC ACID) 500 MG tablet Take 500 mg by mouth daily      glipiZIDE (GLUCOTROL) 10 MG tablet Take 1 tablet by mouth 2 times daily (before meals) 180 tablet 3    lodoxamide (ALOMIDE) 0.1 % ophthalmic solution 1 drop as needed      timolol (TIMOPTIC) 0.5 % ophthalmic solution 1 drop 2 times daily       No current facility-administered medications for this visit. Laboratory & Diagnostics:  Old progress notes from referring physician reviewed. Old labs reviewed: Oct 2014: k 4.3, Creat 0.9  June 2020: UPCR 8 grams/g, Hb AIC 7.8%  24 hr protein 5.5 grams  GOYO: speckled pattern  UA: +300 protein, no blood  JULIET/Complements/Anti GBM: all normal    HbAIC 7.8%  US: B/L renal cysts, Left renal mass 4.4 cm  July 2020: K 4.6, Creat 1.9, Ca 10.2, Albumin 4.2, 24 hr protein 5.5 grams  Aug 2020: creat 1.5, K 4.2, glucose 262, UCR 8.69 g/g, Positive GOYO    Sept 2020; K 5.1, Creat 1.7, Hb 11.6, UPCR 7.93 g/g  Nov 2020: L 4.3, Creat 1.6, UPCR 6.61 g/g  March 2021: K 4.1, Creat 2.0, Hb 11.7, Plt 240, AIC 7.6%  Aug 2021; K 4.6, Creat 3.1, Ca 9.2, Hb 10  Aug 13: creat 3.2, phos 4.8, Alb 3.6     Impression/Plan:   1. Progressive CKD likely vs PREETHI on CKD: has long standing hx of DM and HTN. Has hx biopsy proven diabetic nephropathy. Previous biopsy showed diabetic nephropathy changes. However there was some concern for possible primary FSGS. Biopsy could not confirm. 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 D/W patient. Creat likely going to be at new baseline now. Will monitor. Continue with bumex 2 mg po daily. Recheck labs in 2 weeks. Advised low salt diet. Overall volume status has improved. Patient has lost 5 pounds in last 1 week    2. HTN: on norvasc. Continue coreg- will increase coreg 12.5 mg po BID. 3. Nephrotic range proteinuria; if renal function remains stable then will resume lisinopril  4.  DM nephropathy: Sugars were in 180s. Advised better sugar control  5. Anasarca. As above, continue with bumex. 5 pounds weight loss in 1 week  6. Left renal mass: s/p Left partial nephrectomy (Oct 2020), path c/w Clear cell RCC  7. Positive GOYO: Will be seeing rheumatology. Patient rescheduled appointment. 8. Hx Cataracts  9. Vit D: will replace with Vit D 50K, orders placed    Orders Placed This Encounter   Procedures    Basic Metabolic Panel    Hemoglobin and Hematocrit, Blood    Protein / creatinine ratio, urine     Return in about 4 weeks (around 9/13/2021).       Jase Pratt MD  [facility]

## 2021-08-18 NOTE — PROGRESS NOTES
[address] MEDICINE [address]  [place] [address]  Dept: [phone]  Loc: [phone]  Visit Date: 8/18/2021      Chief Complaint:   Shashi Spears is a 67 y.o. female thatpresents for Follow-up (CT results Mainor Hwang told her to see PCP)    HPI:  ED follow up for PREETHI  Following with Dr. Mainor Hwang  Here to review CT scan  Recently had partial left nephrectomy, path showed renal cell carcinoma  Following with Dr. Wei Cole, last seen 5/21, due to follow up in Nov with repeat CT  CT on 8/6 showed numerous enlarged lymph nodes in the central messentery with haziness t/o the adjacent fat possibly representing infection vs. Inflammatory process, anasarca, post-surgical changes left kidney, colonic diverticulosis. Reports feeling good since being home  Denies chest pain, SOB, dizziness, lightheadedness    The patient comes in with her daughter today. History obtained from pt, daughter, and medical records. I have reviewed the patient's past medical history, past surgical history, allergies,medications, social and family history and I have made updates where appropriate.     Past Medical History:   Diagnosis Date    Cataracts, bilateral     Diabetes mellitus ([address]     Glaucoma     History of blood transfusion 06/1961    age 15 , s/p leg fracture surgery [facility]    Hyperlipidemia     Hypertension     Nausea & vomiting     PONV (postoperative nausea and vomiting)        Past Surgical History:   Procedure Laterality Date    ABDOMINAL EXPLORATION SURGERY  1996    Adhesions     CHOLECYSTECTOMY  80    CORNEAL TRANSPLANT      HERNIA REPAIR  90    HYSTERECTOMY  89    INCISIONAL HERNIA REPAIR  10/28/2014    laparoscopic incisional herina repair w mesh--Dr. Lee Ann Montiel    LEG SURGERY Right 1962    FX W/PLATES & REMOVED    PARTIAL NEPHRECTOMY Left 10/8/2020    ROBOTIC LEFT PARTIAL NEPHRECTOMY performed by Augustine Alfredo MD at [address]   as a child Social History     Tobacco Use    Smoking status: Never Smoker    Smokeless tobacco: Never Used   Vaping Use    Vaping Use: Never used   Substance Use Topics    Alcohol use: No    Drug use: No       Family History   Problem Relation Age of Onset    Cancer Mother     Heart Disease Father     Diabetes Sister     Diabetes Brother          PHYSICAL EXAM:  /72   Pulse 64   Temp 97.7 °F (36.5 °C) (Infrared)   Resp 14   Ht 5' (1.524 m)   Wt 170 lb 9.6 oz (77.4 kg)   SpO2 96%   BMI 33.32 kg/m²     Physical Exam  Constitutional:       Appearance: Normal appearance. HENT:      Head: Normocephalic and atraumatic. Right Ear: External ear normal.      Left Ear: External ear normal.      Nose: Nose normal.      Mouth/Throat:      Mouth: Mucous membranes are moist.   Eyes:      Conjunctiva/sclera: Conjunctivae normal.   Cardiovascular:      Rate and Rhythm: Normal rate and regular rhythm. Pulses: Normal pulses. Heart sounds: Normal heart sounds. Pulmonary:      Effort: Pulmonary effort is normal.      Breath sounds: Normal breath sounds. Abdominal:      General: Bowel sounds are normal.      Palpations: Abdomen is soft. Musculoskeletal:         General: Normal range of motion. Cervical back: Normal range of motion. Left lower leg: Edema present. Skin:     General: Skin is warm and dry. Neurological:      General: No focal deficit present. Mental Status: She is alert and oriented to person, place, and time. Psychiatric:         Mood and Affect: Mood normal.         Behavior: Behavior normal.             ASSESSMENT & PLAN  Marguerite Pitts was seen today for follow-up.     Diagnoses and all orders for this visit:    Stage 3b chronic kidney disease    H/O partial nephrectomy    CT scan showed enlarged lymph noes with haziness t/o adjacent fat  Reviewed case with Nidia Oskare CNP  Lymph nodes were noted on last CT she had prior to appt with Dr. Meenu Jara  She is d/t follow up in Nov with repeat imaging  It will be rechecked then  Pt feeling well today  Will follow labs and she is scheduled for follow up in 3 weeks with Dr. Francisco Ford    No follow-ups on file. I spent 36 minutes reviewing previous notes and testing, discussing symptoms, medications and side effects, treatment options with the patient, performing an exam, and developing a plan of care. All questions answered. Patient and daughter verbalized understanding. Bar Delatorre received counseling on the following healthy behaviors: nutrition, exercise and medication adherence  Reviewedprior labs and health maintenance. Continue current medications, diet and exercise. Discussed use, benefit, and side effects of prescribed medications. Barriers to medication compliance addressed. Patient given educationalmaterials - see patient instructions. All patient questions answered. Patient voiced understanding.      Electronically signed by KEHINDE Gastelum - CNP, APRN on 8/18/21 at 2:29 PM EDT

## 2021-08-18 NOTE — PATIENT INSTRUCTIONS
Patient Education        Low Sodium Diet (2,000 Milligram): Care Instructions  Overview     Limiting sodium can be an important part of managing some health problems. The most common source of sodium is salt. People get most of the salt in their diet from canned, prepared, and packaged foods. Fast food and restaurant meals also are very high in sodium. Your doctor will probably limit your sodium to less than 2,000 milligrams (mg) a day. This limit counts all the sodium in prepared and packaged foods and any salt you add to your food. Follow-up care is a key part of your treatment and safety. Be sure to make and go to all appointments, and call your doctor if you are having problems. It's also a good idea to know your test results and keep a list of the medicines you take. How can you care for yourself at home? Read food labels  · Read labels on cans and food packages. The labels tell you how much sodium is in each serving. Make sure that you look at the serving size. If you eat more than the serving size, you have eaten more sodium. · Food labels also tell you the Percent Daily Value for sodium. Choose products with low Percent Daily Values for sodium. · Be aware that sodium can come in forms other than salt, including monosodium glutamate (MSG), sodium citrate, and sodium bicarbonate (baking soda). MSG is often added to Asian food. When you eat out, you can sometimes ask for food without MSG or added salt. Buy low-sodium foods  · Buy foods that are labeled \"unsalted\" (no salt added), \"sodium-free\" (less than 5 mg of sodium per serving), or \"low-sodium\" (140 mg or less of sodium per serving). Foods labeled \"reduced-sodium\" and \"light sodium\" may still have too much sodium. Be sure to read the label to see how much sodium you are getting. · Buy fresh vegetables, or frozen vegetables without added sauces. Buy low-sodium versions of canned vegetables, soups, and other canned goods.   Prepare low-sodium meals  · Cut back on the amount of salt you use in cooking. This will help you adjust to the taste. Do not add salt after cooking. One teaspoon of salt has about 2,300 mg of sodium. · Take the salt shaker off the table. · Flavor your food with garlic, lemon juice, onion, vinegar, herbs, and spices. Do not use soy sauce, lite soy sauce, steak sauce, onion salt, garlic salt, celery salt, or ketchup on your food. · Use low-sodium salad dressings, sauces, and ketchup. Or make your own salad dressings and sauces without adding salt. · Use less salt (or none) when recipes call for it. You can often use half the salt a recipe calls for without losing flavor. Other foods such as rice, pasta, and grains do not need added salt. · Rinse canned vegetables, and cook them in fresh water. This removes somebut not allof the salt. · Avoid water that is naturally high in sodium or that has been treated with water softeners, which add sodium. If you buy bottled water, read the label and choose a sodium-free brand. Avoid high-sodium foods  · Avoid eating:  ? Smoked, cured, salted, and canned meat, fish, and poultry. ? Ham, talbot, hot dogs, and luncheon meats. ? Regular, hard, and processed cheese and regular peanut butter. ? Crackers with salted tops, and other salted snack foods such as pretzels, chips, and salted popcorn. ? Frozen prepared meals, unless labeled low-sodium. ? Canned and dried soups, broths, and bouillon, unless labeled sodium-free or low-sodium. ? Canned vegetables, unless labeled sodium-free or low-sodium. ? [place] fries, pizza, tacos, and other fast foods. ? Pickles, olives, ketchup, and other condiments, especially soy sauce, unless labeled sodium-free or low-sodium. Where can you learn more? Go to https://hanna.[employer]. org and sign in to your [employer] account. Enter U062 in the [facility] box to learn more about \"Low Sodium Diet (2,000 Milligram): Care Instructions. \" If you do not have an account, please click on the \"Sign Up Now\" link. Current as of: December 17, 2020               Content Version: 12.9  © 2006-2021 [employer]. Care instructions adapted under license by [facility] ([facility]). If you have questions about a medical condition or this instruction, always ask your healthcare professional. [address] any warranty or liability for your use of this information.

## 2021-08-27 NOTE — PROGRESS NOTES
Swati   Date Of Service: 2021  Provider: Antione Guerrero DO, DO  Name: Estefani Mccabe   MRN: [id number]    Chief Complaint(s)      Chief Complaint   Patient presents with    New Patient     referral from Dr Carlos Enrique Corona for positive MALA         History of Present illness (HPI)    Estefani Mccabe   is a(n)76 y.o. female with a hx of  has a past medical history of Cataracts, bilateral, Diabetes mellitus ([address] Glaucoma, History of blood transfusion, Hyperlipidemia, Hypertension, Nausea & vomiting, glaucoma,  and PONV (postoperative nausea and vomiting), s/p renal cell carcinoma () w/ partial nephrectomy    referred by Dionicio Jean MD for evaluation of Nephrotic range proteinuria, positive MALA, CKD stage III, left renal mass (cancer)  and essential hypertension. Patient developed proteinruia that was evaluated by nephrology. Renal biopsy concerning for FSGS, diabetic nephrolopathy and arterosclerosis. Found to have a + mala and referred to rheumatology for evaluation. Diagnosed with renal cell carcinoma  with partial nephrectomy. + dependent edema, Denies urinary changes (foamy urination, hematuria, frequency). No active arthritic symptoms, AM stiffness. H/o genearlized aching pain in  after passing of the father treatd by her PCP that was then taper off w/o recurrence. Reported h/o gout diagnosed 10-12 years ago. affecting the feet (great toe and lateral foot). Previously treated by Dr. Carlos Silva. No flares in several years. Only 3-4 flares previously. +mild redness, swelling, and pain. H/o cancker sores in childhood but none for years. Typically on the tongue. Denies vaginal ulcers  Reported swelling of lymphnodes on recent CT a/p.      Denies Photosenstivity, Rash, dry mouth/dry eyes, oral/nasal sores, Raynaud's, digital ulcerations, skin tightening, ,foamy urination, hematuria, sz's, blood clots,  labor loss, miscarriages, eclampsia and pre-eclampsia, AIHA, hair loss, serositis, joint pains. Denies family hx of lupus. Cancer screening: up to date. Occupation - prior working at [employer] and then at the [employer] for years. Review of Systems    Review of Systems   Constitutional: Positive for fatigue. Negative for diaphoresis and fever. HENT: Negative for congestion, hearing loss and nosebleeds. Eyes: Negative. Negative for pain and redness. Respiratory: Negative for cough, shortness of breath and wheezing. Cardiovascular: Positive for leg swelling. Negative for chest pain. Gastrointestinal: Negative for constipation, diarrhea, nausea and vomiting. Genitourinary: Negative for difficulty urinating, frequency and hematuria. Musculoskeletal: Negative for myalgias. Skin: Negative for rash. Neurological: Negative for dizziness, weakness and headaches. Hematological: Does not bruise/bleed easily. Psychiatric/Behavioral: Negative for sleep disturbance. The patient is not nervous/anxious. PAST MEDICAL HISTORY     has a past medical history of Cataracts, bilateral, Diabetes mellitus ([address] Glaucoma, History of blood transfusion, Hyperlipidemia, Hypertension, Nausea & vomiting, and PONV (postoperative nausea and vomiting). PAST SURGICAL HISTORY     has a past surgical history that includes Leg Surgery (Right, 1962); Tonsillectomy (as a child); Cholecystectomy (84); hernia repair (90); Hysterectomy (89); Incisional hernia repair (10/28/2014); Corneal transplant; Abdominal exploration surgery (1996); and partial nephrectomy (Left, 10/8/2020). FAMILY HISTORY      Family History   Problem Relation Age of Onset    Cancer Mother     Heart Disease Father     Diabetes Sister     Diabetes Brother        SOCIAL HISTORY     reports that she has never smoked. She has never used smokeless tobacco. She reports that she does not drink alcohol and does not use drugs.       ALLERGIES     Allergies   Allergen Reactions    Relafen [Nabumetone] Nausea Only    Sulfa Antibiotics Nausea Only       CURRENT MEDICATIONS      Current Outpatient Medications:     vitamin D (ERGOCALCIFEROL) 1.25 MG (03995 UT) CAPS capsule, Take 1 capsule by mouth once a week, Disp: 12 capsule, Rfl: 0    carvedilol (COREG) 12.5 MG tablet, Take 1 tablet by mouth 2 times daily, Disp: 180 tablet, Rfl: 0    bumetanide (BUMEX) 2 MG tablet, Take 1 tablet by mouth daily, Disp: 90 tablet, Rfl: 3    blood glucose test strips (GLUCOSE METER TEST) strip, 1 each by In Vitro route daily One Touch Ultra Blue Test Strips [employer], Dx E11.9, Disp: 100 strip, Rfl: 5    pravastatin (PRAVACHOL) 40 MG tablet, Take 1 tablet by mouth nightly, Disp: 90 tablet, Rfl: 3    amLODIPine (NORVASC) 10 MG tablet, Take 1 tablet by mouth daily, Disp: 90 tablet, Rfl: 3    SITagliptin (JANUVIA) 100 MG tablet, Take 1 tablet by mouth daily, Disp: 90 tablet, Rfl: 3    aspirin 325 MG tablet, Take 325 mg by mouth daily, Disp: , Rfl:     vitamin C (ASCORBIC ACID) 500 MG tablet, Take 500 mg by mouth daily, Disp: , Rfl:     glipiZIDE (GLUCOTROL) 10 MG tablet, Take 1 tablet by mouth 2 times daily (before meals), Disp: 180 tablet, Rfl: 3    lodoxamide (ALOMIDE) 0.1 % ophthalmic solution, 1 drop as needed, Disp: , Rfl:     timolol (TIMOPTIC) 0.5 % ophthalmic solution, 1 drop 2 times daily, Disp: , Rfl:     PHYSICAL EXAMINATION / OBJECTIVE     Objective:  BP (!) 150/60 (Site: Left Upper Arm, Position: Sitting, Cuff Size: Medium Adult)   Pulse 69   Ht 5' (1.524 m)   Wt 170 lb 10.2 oz (77.4 kg)   SpO2 96%   BMI 33.33 kg/m²     General Appearance:   alert and oriented to person, place and time well-developed and well nourished  Physch : appropriate affects ,   Head:  Normocephalic and atraumatic  Eyes: No gross abnormalities. ,  PERRL, Sclera nonicteric, conjunctiva non-INJECTED  ENT:  MMM,  no deformities , NO oral/nasal sores, Non-tender sinuses.    Neck:  Neck supple, Non-tender, No  mass, thyromegaly,    Lymph: No cervical  or  supraclavicular lymph node swelling. Pulmonary/Chest:  CTA bilat. , normal air movement, no respiratory distress  Cardiovascular:  Normal rate, + S1 and S2,  NO murmurs , rubs, gallups,       :  Deferred   Abd/GI: Deferred   Neurologic:  gait and coordination normal and speech normal  Skin:  + silver plaque in the occiptal region (picture below obtained after verbal consent received)  - onycholysis noted right inde, and left 5th finger. Extremities:  No clubbing ,     Musculoskeletal:  Mild thoracic kyphosis. Upper extremities:   No synovitis or joint swelling of the bilat upper ext.  + heberden nodes right 1,2 finger. Lower extremities: - Non-tender, No swelling. Spine: - Non-tender,       KEY:  Tender :  T  Swelling: S      Non-tender : NT  No swelling: NS           RAPID 3  -- Composite Score MDHAQ (0-10) + Patient pain VAS (0-10): + Patient global assessment VAS (0-10):     8/27/2021 --- RAPID 3 :  +  +  =      Remission: <3  Low Disease Activity: <6  Moderate Disease Activity: >=6 and <=12  High Disease Activity: >12    LABS        CBC  Lab Results   Component Value Date    WBC 9.3 08/13/2021    RBC 3.72 08/13/2021    RBC 3.21 10/16/2020    HGB 10.6 08/13/2021    HCT 33.6 08/13/2021    MCV 90.3 08/13/2021    MCH 28.5 08/13/2021    MCHC 31.5 08/13/2021    RDW 13.1 10/16/2020     08/13/2021       CMP  Lab Results   Component Value Date    CALCIUM 9.4 08/13/2021    LABALBU 3.6 08/13/2021    PROT 6.8 08/13/2021    PROT 6.3 01/17/2017     08/13/2021    K 4.3 08/13/2021    K 4.6 08/06/2021    CO2 26 08/13/2021    CL 98 08/13/2021    BUN 43 08/13/2021    CREATININE 3.2 08/13/2021    ALKPHOS 108 08/13/2021    ALT 13 08/13/2021    AST 26 08/13/2021       HgBA1c: No components found for: HGBA1C    No results found for: TSHFT4, TSH  Lab Results   Component Value Date    VITD25 11 08/13/2021         Lab Results   Component Value Date    ANASCRN Detected (A) 07/09/2020     No Post-Void - 3.0 mL            KIDNEYS:  Both kidneys are normal in size and contour. Elevated resistive indices bilaterally, consistent with medical renal disease. MASS/CYST: There are a few benign-appearing cysts in the right kidney, largest right side measuring 3 cm, unchanged from prior study. Solitary benign appearing 2 cm cyst left kidney. Ari Confer HYDRONEPHROSIS:  There is no hydronephrosis. CALCULI:  Small nonobstructing 7 mL calculus mid body left kidney. BLADDER:  The urinary bladder is unremarkable. .                Impression   Elevated resistive indices. No acute findings. PROCEDURE: CT ABDOMEN PELVIS WO CONTRAST       CLINICAL INFORMATION: 78-year-old female with left flank swelling and elevated creatinine. .       COMPARISON: CT scan 4/30/2021. TECHNIQUE: Axial 5 mm CT images were obtained through the abdomen and pelvis. No contrast was given. Coronal reconstructions were obtained. All CT scans at this facility use dose modulation, iterative reconstruction, and/or weight-based dosing when appropriate to reduce radiation dose to as low as reasonably achievable. FINDINGS:   There is some atelectasis at the lung bases. No pleural effusion. The base of the heart is within acceptable limits. There are mitral valve annular calcifications. Lack of IV contrast limits evaluation of the abdominal organs. The gallbladder is surgically absent. There are calcified granulomas in the spleen. The pancreas and adrenal glands are within normal limits. The liver has a smooth contour. There is irregularity of the left kidney. There are postsurgical changes from a partial nephrectomy. There is a nonobstructive stone in the left kidney. There are several cysts arising from the right kidney. There is no hydronephrosis. There are numerous prominent lymph nodes in the central mesentery.  There is also haziness in this region. There is no evidence of a small bowel obstruction. There are diverticula throughout the colon. There is no colonic wall thickening. The urinary bladder appears normal.       The uterus is absent. There is no free intraperitoneal air or free fluid. There is haziness throughout the superficial subcutaneous soft tissues of the abdomen and pelvis. There are degenerative changes throughout the spine. No acute fracture. Impression       1. There are numerous enlarged lymph nodes in the central mesentery with haziness throughout the adjacent fat. This may be on the basis of an infectious or inflammatory process. 2. Diffuse haziness throughout the superficial subcutaneous soft tissues which could be due to anasarca. 3. Postsurgical changes from partial left nephrectomy. There is a nonobstructive calculus in the left kidney which measures 6 mm. There are several cysts arising from the right kidney. 4. Colonic diverticulosis without evidence of acute diverticulitis. ASSESSMENT/PLAN      1. Stage 3b chronic kidney disease    2. GOYO positive    3. H/O: gout    4. Lymphopenia    5. FSGS (focal segmental glomerulosclerosis)        Nephrotic range proteinuria  --biopsy revealing changes consistent with FSGS, diabetic nephropathy and arterial sclerosis. Negative GBM, ANCA, Waldron, dsDNA, UPEP. Normal complements. CKD stage IV  Positive GOYO 1: 640 -    Lymphopenia  - will repeat the DsDNA, Waldron, SSA, SSB, RNP ab's in light of the renal disease, + GOYO and lymphoenia  - no current phys examine finding concerning for systemic lupus but she does reports childhood hx of oral osres, episodic inflammator arthritis in the feet, previously diagnosed as gout. There is a moderate to low concern for an underlying CTD based upon the lab and historical data. - the + GOYO can be seen with other conditions such as the diabetes.    - remaind od serologic evaluation as outlined below. - holding on addition of medication at this time. H/o gout - checking uric acid, esr, crp. PTH elevation   Vitamin D def    - currently treated by Nephrology, on ergocalciferol 50,000 weekly. Normocytic anemia - can be related to her underlying CKD. - if worsen may want to obtain further evaluation. Osteoarthritis foot, knees, and lumbar spine - no sig. Pain  Reported. 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 ? Reactive / inflammatory but in light of her prior rencal cancer may need additional evaluation. Scalp rash -patient with plaque w/ silver scale that is has the appearance of a  psoriatic plaque. Has noted increased itching in the scalp for some time. 1. Stage 3b chronic kidney disease  2. GOYO positive  -     CBC Auto Differential; Future  -     Sedimentation Rate; Future  -     C-Reactive Protein; Future  -     Uric Acid; Future  -     Anti SSA; Future  -     Anti SSB; Future  -     ANTI-RNP (JORGE AB); Future  -     Waldron (JORGE) Antibody IgG; Future  -     Anti-DNA Antibody, Double-Stranded; Future  -     Comprehensive Metabolic Panel; Future  3. H/O: gout  -     CBC Auto Differential; Future  -     Uric Acid; Future  -     Comprehensive Metabolic Panel; Future  4. Lymphopenia  -     CBC Auto Differential; Future  -     Sedimentation Rate; Future  -     C-Reactive Protein; Future  -     Uric Acid; Future  -     Anti SSA; Future  -     Anti SSB; Future  -     ANTI-RNP (JORGE AB); Future  -     Waldron (JORGE) Antibody IgG; Future  -     Anti-DNA Antibody, Double-Stranded; Future  -     Comprehensive Metabolic Panel; Future  5. FSGS (focal segmental glomerulosclerosis)  -     CBC Auto Differential; Future  -     Sedimentation Rate; Future  -     C-Reactive Protein; Future  -     Uric Acid; Future  -     Anti SSA; Future  -     Anti SSB; Future  -     ANTI-RNP (JORGE AB); Future  -     Waldron (JORGE) Antibody IgG;  Future  -     Anti-DNA Antibody, Double-Stranded; Future  -     Comprehensive Metabolic Panel; Future        Return in about 4 months (around 1/1/2022). Electronically signed by Otto Wheat DO on 8/27/2021 at 2:01 PM    New Prescriptions    No medications on file       8/27/2021       The risks and benefits of my recommendations, as well as other treatment options, benefits and side effects were discussed with the patient today. Questions were answered. Thank you for allowing me to participate in the care of this patient. Please call if there are any questions.

## 2021-09-07 NOTE — PROGRESS NOTES
Medicare Annual Wellness Visit  Name: Vasquez Macias Date: 2021   MRN: [id number] Sex: Female   Age: 67 y.o. Ethnicity: Non- / Non    : 1945 Race: White (non-)      Keenan Schneider is here for Medicare AWV and Other (enlarged lymph nodes on left side)    Screenings for behavioral, psychosocial and functional/safety risks, and cognitive dysfunction are all negative except as indicated below. These results, as well as other patient data from the [address] [facility] form, are documented in Flowsheets linked to this Encounter. Allergies   Allergen Reactions    Relafen [Nabumetone] Nausea Only    Sulfa Antibiotics Nausea Only         Prior to Visit Medications    Medication Sig Taking?  Authorizing Provider   loteprednol (LOTEMAX) 0.5 % ophthalmic suspension INSTILL 1 DROP INTO RIGHT EYE ONLY ONCE DAILY  Historical Provider, MD   vitamin D (ERGOCALCIFEROL) 1.25 MG (18133 UT) CAPS capsule Take 1 capsule by mouth once a week  Jacklyn Meza MD   carvedilol (COREG) 12.5 MG tablet Take 1 tablet by mouth 2 times daily  Jacklyn Meza MD   bumetanide (BUMEX) 2 MG tablet Take 1 tablet by mouth daily  Jacklyn Meza MD   blood glucose test strips (GLUCOSE METER TEST) strip 1 each by In Vitro route daily One Touch Ultra Blue Test Strips [employer], Dx E11.9  Salvador Segura DO   pravastatin (PRAVACHOL) 40 MG tablet Take 1 tablet by mouth nightly  Edmundo Wade DO   amLODIPine (NORVASC) 10 MG tablet Take 1 tablet by mouth daily  Edmundo Wade DO   SITagliptin (JANUVIA) 100 MG tablet Take 1 tablet by mouth daily  Salvador Segura DO   aspirin 325 MG tablet Take 325 mg by mouth daily  Historical Provider, MD   vitamin C (ASCORBIC ACID) 500 MG tablet Take 500 mg by mouth daily  Historical Provider, MD   glipiZIDE (GLUCOTROL) 10 MG tablet Take 1 tablet by mouth 2 times daily (before meals)  Salvador Segura DO   lodoxamide (ALOMIDE) 0.1 % ophthalmic solution 1 drop as needed  Historical Provider, MD   timolol (TIMOPTIC) 0.5 % ophthalmic solution 1 drop 2 times daily  Historical Provider, MD         Past Medical History:   Diagnosis Date    Cataracts, bilateral     [address] mellitus [address] [address]     Glaucoma     History of blood transfusion 06/1961    age 15 , s[facility]p [facility]    Hyperlipidemia     Hypertension     Nausea & vomiting     PONV (postoperative nausea and vomiting)        Past Surgical History:   Procedure Laterality Date    ABDOMINAL EXPLORATION SURGERY  1996    Adhesions     CHOLECYSTECTOMY  79    CORNEAL TRANSPLANT      HERNIA REPAIR  90    HYSTERECTOMY  89    INCISIONAL HERNIA REPAIR  10/28/2014    laparoscopic incisional herina repair w mesh--Dr. Vivian Callahan    LEG SURGERY Right 1962    FX W/PLATES & REMOVED    PARTIAL NEPHRECTOMY Left 10/8/2020    ROBOTIC LEFT PARTIAL NEPHRECTOMY performed by Paola Mishra MD at [address]  as a child         Family History   Problem Relation Age of Onset    Cancer Mother         lymphoma    Heart Disease Father     Diabetes Sister     Diabetes Brother     Cancer Brother         leukemia    Cancer Brother         prostate cance    Heart Disease Brother        CareTeam (Including outside providers/suppliers regularly involved in providing care):   Patient Care Team:  Mike Maurer DO as PCP - General (Family Medicine)  Mike Maurer DO as PCP - [facility] Empaneled Provider    Wt Readings from Last 3 Encounters:   09/07/21 165 lb (74.8 kg)   09/01/21 170 lb 10.2 oz (77.4 kg)   08/18/21 170 lb 9.6 oz (77.4 kg)     Vitals:    09/07/21 1402   BP: (!) 106/58   Pulse: 55   Resp: 18   Temp: 97.3 °F (36.3 °C)   TempSrc: Infrared   SpO2: 97%   Weight: 165 lb (74.8 kg)   Height: 5' (1.524 m)     Body mass index is 32.22 kg/m². Based upon direct observation of the patient, evaluation of cognition reveals recent and remote memory intact.     Patient's complete Health Risk Assessment and screening values have been reviewed and are found in [facility]. The following problems were reviewed today and where indicated follow up appointments were made and/or referrals ordered. Positive Risk Factor Screenings with Interventions:     Fall Risk:  2 or more falls in past year?: (!) yes  Fall with injury in past year?: (!) yes  Fall Risk Interventions:    · Home safety tips provided          General Health and ACP:  General  In general, how would you say your health is?: Good  In the past 7 days, have you experienced any of the following?  New or Increased Pain, New or Increased Fatigue, Loneliness, Social Isolation, Stress or Anger?: None of These  Do you get the social and emotional support that you need?: Yes  Do you have a Living Will?: Yes  Advance Directives     Power of [address] [place] Will ACP-Advance Directive ACP-Power of     Not on File Not on File Not on File Not on File      General Health Risk Interventions:  · no issues idenitfied    Health Habits/Nutrition:  Health Habits/Nutrition  Do you exercise for at least 20 minutes 2-3 times per week?: (!) No  Have you lost any weight without trying in the past 3 months?: No  Do you eat only one meal per day?: No  Have you seen the dentist within the past year?: (!) No  Body mass index: (!) 32.22  Health Habits/Nutrition Interventions:  · Dental exam overdue:  patient encouraged to make appointment with his/her dentist     Safety:  Safety  Do you have working smoke detectors?: Yes  Have all throw rugs been removed or fastened?: (!) No  Do you have non-slip mats or surfaces in all bathtubs/showers?: Yes  Do all of your stairways have a railing or banister?: Yes  Are your doorways, halls and stairs free of clutter?: Yes  Do you always fasten your seatbelt when you are in a car?: Yes  Safety Interventions:  · Home safety tips provided     Personalized Preventive Plan   Current Health Maintenance Status  Immunization History   Administered Date(s) Administered    Td (Adult), 5 Lf Tetanus

## 2021-09-07 NOTE — PROGRESS NOTES
Garret Almonte is a 67 y.o. female that presents for     Chief Complaint   Patient presents with   [facility] Medicare AWV    Other     enlarged lymph nodes on left side       BP (!) 106/58   Pulse 55   Temp 97.3 °F (36.3 °C) (Infrared)   Resp 18   Ht 5' (1.524 m)   Wt 165 lb (74.8 kg)   SpO2 97%   BMI 32.22 kg/m²       HPI    Recent CT of the abdomen with enlarged lymph nodes in the mesentary. No pain, swelling, night sweats, weight loss, diarrhea. Diabetes Type 2    Glucose control:   Does patient check blood glucoses at home? Yes - FBG about 100 for the past 2 weeks  Report of hypoglycemia: no  Lab Results   Component Value Date    LABA1C 7.6 (H) 03/18/2021     No results found for: EAG    Symptoms  Polyuria, Polydipsia or Polyphagia? No  Chest Pain, SOB, or Palpitations? -  No  New Vision complaints? No  Paresthesias of the extremities? No    Medications  Current medication were reviewed. Compliant with medications? yes  Medication side effects? No  On ACE-I or ARB? No  On antiplatelet therapy? Yes  On Statin? Yes    Exercise  Exercise? No  Wt Readings from Last 3 Encounters:   09/07/21 165 lb (74.8 kg)   09/01/21 170 lb 10.2 oz (77.4 kg)   08/18/21 170 lb 9.6 oz (77.4 kg)       Diet discipline?:  Low salt, fat, sugar diet?   Has been doing ok    Blood pressure control:  BP Readings from Last 3 Encounters:   09/07/21 (!) 106/58   09/01/21 118/62   08/18/21 128/72       No results found for: Katie Zepeda    Lab Results   Component Value Date    [address] 01/17/2017           Health Maintenance   Topic Date Due    COVID-19 Vaccine (1) Never done    Shingles Vaccine (1 of 2) Never done    Pneumococcal 65+ years Vaccine (1 of 1 - PPSV23) Never done    DTaP/Tdap/Td vaccine (1 - Tdap) 03/09/2017    Lipid screen  01/17/2018    Annual Wellness Visit (AWV)  Never done    Flu vaccine (1) Never done    Potassium monitoring  08/13/2022    Creatinine monitoring  08/13/2022    DEXA (modify frequency per FRAX score)  Completed    Hepatitis C screen  Completed    Hepatitis A vaccine  Aged Out    Hib vaccine  Aged Out    Meningococcal (ACWY) vaccine  Aged Out       Past Medical History:   Diagnosis Date    Cataracts, bilateral     Diabetes mellitus ([address]     Glaucoma     History of blood transfusion 06/1961    age 15 , s/p leg fracture surgery [facility]    Hyperlipidemia     Hypertension     Nausea & vomiting     PONV (postoperative nausea and vomiting)        Past Surgical History:   Procedure Laterality Date    ABDOMINAL EXPLORATION SURGERY  1996    Adhesions     CHOLECYSTECTOMY  80    CORNEAL TRANSPLANT      HERNIA REPAIR  80    HYSTERECTOMY  80    INCISIONAL HERNIA REPAIR  10/28/2014    laparoscopic incisional herina repair w mesh--Dr. Catherine Phillips    LEG SURGERY Right 1962    FX W/PLATES & REMOVED    PARTIAL NEPHRECTOMY Left 10/8/2020    ROBOTIC LEFT PARTIAL NEPHRECTOMY performed by Dex Hong MD at [place] [address]  as a child       Social History     Tobacco Use    Smoking status: Never Smoker    Smokeless tobacco: Never Used   Vaping Use    Vaping Use: Never used   Substance Use Topics    Alcohol use: No    Drug use: No       Family History   Problem Relation Age of Onset    Cancer Mother         lymphoma    Heart Disease Father     Diabetes Sister     Diabetes Brother     Cancer Brother         leukemia    Cancer Brother         prostate cance    Heart Disease Brother          I have reviewed the patient's past medical history, past surgical history, allergies, medications, social and family history and I have made updates where appropriate. Review of Systems        PHYSICAL EXAM:  BP (!) 106/58   Pulse 55   Temp 97.3 °F (36.3 °C) (Infrared)   Resp 18   Ht 5' (1.524 m)   Wt 165 lb (74.8 kg)   SpO2 97%   BMI 32.22 kg/m²     Physical Exam  Vitals and nursing note reviewed. Constitutional:       General: She is not in acute distress.      Appearance: She is well-developed. HENT:      Head: Normocephalic and atraumatic. Right Ear: Hearing and external ear normal.      Left Ear: Hearing and external ear normal.      Nose: Nose normal.   Eyes:      General:         Right eye: No discharge. Left eye: No discharge. Conjunctiva/sclera: Conjunctivae normal.   Neck:      Thyroid: No thyromegaly. Trachea: No tracheal deviation. Cardiovascular:      Rate and Rhythm: Normal rate and regular rhythm. Heart sounds: Normal heart sounds. No murmur heard. No friction rub. No gallop. Pulmonary:      Effort: Pulmonary effort is normal. No respiratory distress. Breath sounds: No wheezing or rales. Chest:      Chest wall: No tenderness. Musculoskeletal:         General: No deformity. Cervical back: Normal range of motion and neck supple. Lymphadenopathy:      Cervical: No cervical adenopathy. Skin:     General: Skin is warm and dry. Findings: No erythema or rash. Neurological:      Mental Status: She is alert. Motor: No abnormal muscle tone. Coordination: Coordination normal.   Psychiatric:         Behavior: Behavior normal.         Thought Content: Thought content normal.         Judgment: Judgment normal.             ASSESSMENT & PLAN  Bar Delatorre was seen today for medicare awv and other. Diagnoses and all orders for this visit:    Routine general medical examination at a health care facility    Type 2 diabetes mellitus with other diabetic kidney complication, without long-term current use of insulin (HCC)  -     POCT glycosylated hemoglobin (Hb A1C)    -Scheduled to have upcoming CT of the Abd/pelvis for her kidney and we can recheck the lymphadenopathy at that time. She does not currntly have any concerning sx, however, I advised her on what to monitor for.   Will check her chart in 2 months, if no CT at that time, will order CT a/p w/o contrast.  -Chronic issues stable, continue current medications  -Advised to call if any issues      Return in about 6 months (around 3/7/2022). The most recent results of the following tests were reviewed with the patient today: A1c     All copied or forwarded information in the progress note was verified by me to be accurate at the time of visit  Patient's past medical, surgical, social and family history were reviewed and updated     All patient questions answered. Patient voiced understanding.

## 2021-09-07 NOTE — PATIENT INSTRUCTIONS
Personalized Preventive Plan for Eldon Be - 9/7/2021  Medicare offers a range of preventive health benefits. Some of the tests and screenings are paid in full while other may be subject to a deductible, co-insurance, and/or copay. Some of these benefits include a comprehensive review of your medical history including lifestyle, illnesses that may run in your family, and various assessments and screenings as appropriate. After reviewing your medical record and screening and assessments performed today your provider may have ordered immunizations, labs, imaging, and/or referrals for you. A list of these orders (if applicable) as well as your Preventive Care list are included within your After Visit Summary for your review. Other Preventive Recommendations:    · A preventive eye exam performed by an eye specialist is recommended every 1-2 years to screen for glaucoma; cataracts, macular degeneration, and other eye disorders. · A preventive dental visit is recommended every 6 months. · Try to get at least 150 minutes of exercise per week or 10,000 steps per day on a pedometer . · Order or download the FREE \"Exercise & Physical Activity: Your Everyday Guide\" from [employer]. Call 6-444.788.5261 or search [employer] online. · You need 4778-8351 mg of calcium and 4338-0610 IU of vitamin D per day. It is possible to meet your calcium requirement with diet alone, but a vitamin D supplement is usually necessary to meet this goal.  · When exposed to the sun, use a sunscreen that protects against both UVA and UVB radiation with an SPF of 30 or greater. Reapply every 2 to 3 hours or after sweating, drying off with a towel, or swimming. · Always wear a seat belt when traveling in a car. Always wear a helmet when riding a bicycle or motorcycle.

## 2021-09-09 NOTE — TELEPHONE ENCOUNTER
----- Message from Peggy Dumont DO sent at 9/9/2021  1:08 PM EDT -----  Your kidney function tests remain elevated and stable compared to prior evaluations. The testing for inflammation with erythrocyte sedimentation rate and C-reactive protein levels are elevated. The uric acid is helpful with the evaluation of gout and abnormal.  However you are on medications can potentially cause elevation of the uric acid and your chronic kidney disease is also a predisposing factor. The potassium is low. I would like for you to contact your nephrologist and discuss the low potassium level and possible supplementation.

## 2021-09-09 NOTE — TELEPHONE ENCOUNTER
----- Message from Teja Hwang DO sent at 9/8/2021  5:23 PM EDT -----  White blood cell counts revealed a mildly low lymphocyte count previously noted on prior evaluations. The anemia is stable compared to prior evaluations. Several tests are still pending once they return you will be notified of any abnormalities.

## 2021-09-14 NOTE — TELEPHONE ENCOUNTER
----- Message from Stella Sharp DO sent at 9/13/2021  5:19 PM EDT -----  The case was further discussed with the nephrologist.  At this time there is lower concern for systemic lupus or similar connective tissue disease. We will monitor your clinical status and should future change prior to next appointment I would like for you to contact the office.

## 2021-09-14 NOTE — TELEPHONE ENCOUNTER
----- Message from Patsy Jason DO sent at 9/13/2021  7:26 AM EDT -----  Additional lab test needs to help evaluate for systemic lupus and a few other conditions were negative.  There is one test that is still remaining once this returned you will be notified of the results

## 2021-09-14 NOTE — TELEPHONE ENCOUNTER
----- Message from Peggy Dumont DO sent at 9/13/2021  4:48 PM EDT -----   Labs to help evaluate for systemic lupus or a similar type condition were negative. You do have a positive antinuclear antibody and a prolonged history of low lymphocyte count. I am reaching out to the nephrologist today to further discuss the previous kidney biopsy and possible correlation with these lab abnormalities. I hope  to have more information did provide you in the near future.

## 2021-10-07 NOTE — TELEPHONE ENCOUNTER
Left message for pt that a new script was sent to [facility] on [address]     Script pending

## 2021-10-07 NOTE — TELEPHONE ENCOUNTER
Do you want her to get the Vitamin D refilled. She stated that you told her to take it for a month.     Please advise

## 2021-10-18 NOTE — TELEPHONE ENCOUNTER
Patient to be scheduled for a CT Abdomen Pelvis W WO Contrast . Is the patient ok to have the contrast?

## 2021-10-19 NOTE — TELEPHONE ENCOUNTER
Patient scheduled for CT ABDOMEN PELVIS W WO  at [facility] OPE ARRIVAL OF 3:50 PM FOR A4:00PM SCAN TIME WITH NO PREP on 11/3/21. Patient advised of instructions.   Order mailed/given to patient

## 2021-11-04 NOTE — PROGRESS NOTES
Indu Bradford MD  Urology Clinic Progress Note      Patient:  Francine Postal  YOB: 1945  Date: 11/4/2021    HISTORY OF PRESENT ILLNESS:   The patient is a 67 y.o. female who presents today for follow-up for the following problem(s):      1. 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 Nephrolithiasis    3. Renal cyst         Overall the problem(s) : are stable  Associated Symptoms: No dysuria, gross hematuria. Pain Severity:      Summary of old records: renal mass    Additional History: Onset 1 month, incidentally found  Severity is described as moderate to severe. The course of symptoms over time is insidious. Alleviating factors: none  Worsening factors: none  Lower urinary tract symptoms: non bothersome  CKD  Julianna, hysterectomy, hernia    Robotic Laparascopic assisted left side partial nephrectomy 10/2020  Left renal cortical biopsy    FINAL DIAGNOSIS:   A - Tissue from left renal cortex:    Specimen sent to [facility] for evaluation.  Please see separate   reference report. B -  Left kidney mass, partial nephrectomy:    Clear-cell renal cell carcinoma, Grade 1.    Parenchymal margin focally positive for tumor.    pT1a,  pNx     Gross margins negative    I independently reviewed and verified the images and reports from:    CT ABDOMEN PELVIS WO CONTRAST Additional Contrast? None    Result Date: 11/3/2021  PROCEDURE: CT ABDOMEN PELVIS WO CONTRAST CLINICAL INFORMATION: Renal cell carcinoma of left kidney ([facility] [address] . COMPARISON: CT abdomen pelvis dated 8/6/2021. TECHNIQUE: Axial 5 mm CT images were obtained through the abdomen and pelvis. No contrast was given. Coronal and sagittal reconstructions were created. All CT scans at this facility use dose modulation, iterative reconstruction, and/or weight-based dosing when appropriate to reduce radiation dose to as low as reasonably achievable. FINDINGS:  There are posterior dependent changes, left greater than right.  Visualized portions of the lungs are otherwise clear. There is a prosthetic mitral valve 4 prominent mitral valve calcification, stable compared to prior exam. The heart is enlarged, similar to prior exam. There is a mild hiatal hernia, similar to prior exam. The gallbladder surgically absent. The unopacified liver is grossly unremarkable. Adrenal glands are unremarkable. There are surgical changes from prior partial nephrectomy on the left with irregular contour of the parenchyma and adjacent scarlike opacity, stable compared to prior exam. No definite residual or recurrent mass is identified. There is stable exophytic cyst at the interpolar region of the right kidney and a stable hypodense cystic lesion at the inferior pole. There is stable nonspecific perinephric fat stranding. There is a stable calculus at the superior pole of the left kidney. No hydronephrosis is present. No ureterolithiasis is identified. There are numerous calcified granulomas in the spleen which is otherwise unremarkable. The pancreas is grossly unremarkable. There are small mesenteric lymph nodes, decreased in size and number compared to prior exam. Hazy mesenteric fat has resolved. No definite lymphadenopathy is present. There are numerous diverticula within the large bowel without adjacent inflammation to suggest diverticulitis. Small bowel appears within normal limits. The unopacified bladder is unremarkable. The uterus appears to be surgically absent. No free fluid is  identified. There are stable degenerative changes of the lumbar spine. No suspicious osseous lesion is identified. 1. Redemonstration of surgical changes from partial left nephrectomy without definite recurrent mass. 2. Stable nonobstructive nephrolithiasis on the left. 3. Colonic diverticulosis. **This report has been created using voice recognition software. It may contain minor errors which are inherent in voice recognition technology. ** Final report electronically signed by Dr. Veronika Alarcon Deirdre Apgar DO, MD on 11/3/2021 5:13 PM        Imaging Reviewed during this Office Visit:   (results were independently reviewed by physician and radiology report verified)    Urinalysis today:  No results found for this visit on 21.     Last BUN and creatinine:  Lab Results   Component Value Date    BUN 50 (H) 10/07/2021     Lab Results   Component Value Date    CREATININE 3.2 ([facility]) 10/07/2021       PAST MEDICAL, FAMILY AND SOCIAL HISTORY UPDATE:  Past Medical History:   Diagnosis Date    Cataracts, bilateral     Diabetes mellitus ([address]     Glaucoma     History of blood transfusion 1961    age 15 , s/p leg fracture surgery [facility]    Hyperlipidemia     Hypertension     Nausea & vomiting     PONV (postoperative nausea and vomiting)      Past Surgical History:   Procedure Laterality Date    ABDOMINAL EXPLORATION SURGERY      Adhesions     CHOLECYSTECTOMY  80    CORNEAL TRANSPLANT      HERNIA REPAIR  90    HYSTERECTOMY  89    INCISIONAL HERNIA REPAIR  10/28/2014    laparoscopic incisional herina repair w mesh--Dr. Jackelyn Rothman    LEG SURGERY Right     FX W/PLATES & REMOVED    PARTIAL NEPHRECTOMY Left 10/8/2020    ROBOTIC LEFT PARTIAL NEPHRECTOMY performed by Andrea Butler MD at [facility]  as a child     Family History   Problem Relation Age of Onset    Cancer Mother         lymphoma    Heart Disease Father     Diabetes Sister     Diabetes Brother     Cancer Brother         leukemia    Cancer Brother         prostate cance    Heart Disease Brother      Outpatient Medications Marked as Taking for the 21 encounter (Office Visit) with Andrea Butler MD   Medication Sig Dispense Refill    glipiZIDE (GLUCOTROL) 10 MG tablet TAKE 1 TABLET BY MOUTH TWICE DAILY BEFORE MEAL(S) 180 tablet 3    vitamin D (ERGOCALCIFEROL) 1.25 MG (81889 UT) CAPS capsule Take 1 capsule by mouth once a week 4 capsule 1    lisinopril (PRINIVIL;ZESTRIL) 5 MG tablet Take 1 tablet by mouth daily 90 tablet 1    loteprednol (LOTEMAX) 0.5 % ophthalmic suspension INSTILL 1 DROP INTO RIGHT EYE ONLY ONCE DAILY      carvedilol (COREG) 12.5 MG tablet Take 1 tablet by mouth 2 times daily 180 tablet 0    bumetanide (BUMEX) 2 MG tablet Take 1 tablet by mouth daily 90 tablet 3    blood glucose test strips (GLUCOSE METER TEST) strip 1 each by In Vitro route daily One Touch Ultra Blue Test Strips [employer], Dx E11.9 100 strip 5    pravastatin (PRAVACHOL) 40 MG tablet Take 1 tablet by mouth nightly 90 tablet 3    SITagliptin (JANUVIA) 100 MG tablet Take 1 tablet by mouth daily 90 tablet 3    aspirin 325 MG tablet Take 325 mg by mouth daily      vitamin C (ASCORBIC ACID) 500 MG tablet Take 500 mg by mouth daily      lodoxamide (ALOMIDE) 0.1 % ophthalmic solution 1 drop as needed      timolol (TIMOPTIC) 0.5 % ophthalmic solution 1 drop 2 times daily         Relafen [nabumetone] and Sulfa antibiotics  Social History     Tobacco Use   Smoking Status Never Smoker   Smokeless Tobacco Never Used       Social History     Substance and Sexual Activity   Alcohol Use No       REVIEW OF SYSTEMS:  Constitutional: negative  Eyes: negative  Respiratory: negative  Cardiovascular: negative  Gastrointestinal: negative  Genitourinary: negative except for what is in HPI  Musculoskeletal: negative  Skin: negative   Neurological: negative  Hematological/Lymphatic: negative  Psychological: negative    Physical Exam:      Vitals:    11/04/21 1245   BP: (!) 154/72     Patient is a 67 y.o. female in no acute distress and alert and oriented to person, place and time. NAD, Alert  Non labored respiration  Vale-umbilical hernia, non tender      Assessment and Plan      1. 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 Nephrolithiasis    3.  Renal cyst           Plan:        Repeat CT with contrast in 6 months for surveillance  Left kidney stone: asymptomatic, will observe for now, discuss ESWL in future  Vale-umbilical hernia, non tender, painless: discussed referral to Gen surgery, she would like observe fo rnow.                 Sherri Sharpe MD  [facility] Urology

## 2021-12-14 PROBLEM — I46.9 CARDIAC ARREST (HCC): Status: ACTIVE | Noted: 2021-01-01

## 2021-12-14 NOTE — PROGRESS NOTES
[facility] Laboratory Technician Worksheet      EEG Date: 2021    Name: Fiorella Knox   : 1945   Age: 67 y.o. SEX: female    ROOM: [facility] MRN: [id number]           CSN: [id number]      Ordering Provider: MIGUEL Kilpatrick  EEG Number: [id number] Time of Test:  279    Hand: Unknown   Sedation: no    H.V. Done: No on vent Photic: No    Sleep: No  Drowsy: No   Sleep Deprived: No    Seizures observed: no    Mentality: unresponsive    Clinical History: Patient to ED via EMS 21 0140. Patient called daughter, who lives across the street to tell her she was having trouble breathing. When daughter arrived, patient was down and EMS was called. Patient not breathing and in full arrest when EMS arrived and CPR was started. Patient in asystole upon arrival to ED. ROSC achieved, patient bradycardic and was intubated. Small amount of food found in trachea that was cleared. Patient is not on sedation at this time and is unresponsive. CT Head 21  Impression   Bilateral thalamic hypodensity consistent with edema, possibly infarcts. Subtle bilateral low-attenuation of parieto-occipital matter and partial    effacement of sulci suspected, raising the possibility of generalized    cerebral edema. No hemorrhage identified. These findings are likely    hypoxic ischemic in etiology. The differential diagnosis is relatively    broad otherwise, including infectious and noninfectious encephalopathy,    including toxic/metabolic etiologies.          Past Medical History:       Diagnosis Date    Cataracts, bilateral     Diabetes mellitus ([facility] [address]     Glaucoma     History of blood transfusion 1961    age 15 , s/p leg fracture surgery [facility]    Hyperlipidemia     Hypertension     Nausea & vomiting     PONV (postoperative nausea and vomiting)        Scheduled Meds:   piperacillin-tazobactam  3,375 mg IntraVENous Once    sodium chloride flush  10 mL IntraVENous 2 times per day    heparin (porcine)  5,000 Units SubCUTAneous 3 times per day    piperacillin-tazobactam  3,375 mg IntraVENous Q8H     Continuous Infusions:   norepinephrine 10 mcg/min (12/14/21 1138)    sodium chloride      phenylephrine (SINAN-SYNEPHRINE) 50mg/250mL infusion 150 mcg/min (12/14/21 1134)    insulin      dextrose       PRN Meds:.sodium chloride flush, sodium chloride, ondansetron **OR** ondansetron, polyethylene glycol, acetaminophen **OR** acetaminophen, insulin regular, dextrose, glucose, dextrose, glucagon (rDNA), dextrose    Technician: Demetri Guevara 12/14/2021

## 2021-12-14 NOTE — FLOWSHEET NOTE
[facility] [place] [facility] notified. Reference # [id number]    Call returned. Pt is not donor candidate, please call with cardiac TOD.

## 2021-12-14 NOTE — H&P
CRITICAL CARE H&P      Patient:  [facility]    Unit/Bed:4D-06/006-A  YOB: 1945  MRN: [id number]   PCP: Nano Ferrer DO  Date of Admission: 12/14/2021  Chief Complaint:-Cardiac arrest    Assessment and Plan:      1. Acute hypoxic respiratory failure: Patient required intubation secondary to cardiac arrest.  Maintain peak pressures less than 35. Mental status precludes extubation. 2. Cardiac arrest: Witnessed arrest at home. ROSC was obtained in approximately 48 minutes. Patient CT head concerning for cerebral edema with possible infarcts. Face foci. Generalized cerebral edema. No hemorrhage. Hypoxic ischemia. 3. Anoxic brain injury: Secondary to cardiac arrest.  EEG shows severe cortical dysfunction. No seizures. Patient's CODE STATUS changed to DNR CCA. Awaiting family decision on goals of care. Pupils fixed and dilated. 4. Undifferentiated shock: Unclear of source. Lactic acid is increased. Pending echo. Cortisol level pending. Cultures pending. 5. PREETHI on CKD: Nephrology consulted. Patient likely will need dialysis over the next 24 to 48 hours if family continues to be aggressive care. 6. Lactic acidosis: Trending down. Likely secondary to cardiac arrest.  Less likely secondary to sepsis. Broad-spectrum antibiotics were initiated to cover. Cultures pending. Patient was not fluid resuscitated due to lack of edema on CT and making no urine. 7. Diabetes mellitus: On insulin drip. 8. Non-anion gap metabolic acidosis: Secondary to lactic acidosis. Trending down. Improving. Nephrology consulting. 9. Hypernatremia: Permissive hypernatremia in the setting of acute brain injury. Monitor. Initiate 3% saline if sodium falls below 145. 10. Leukocytosis: WBC 12.5 on broad-spectrum antibiotics.       INITIAL H AND P AND ICU COURSE:  Guerda Fisher is a 67year old white female who presented to [facility] on 12/14/2021 with complaints of cardiac arrest.  She has past medical history of lifetime non-smoker, cataracts, diabetes mellitus, hyperlipidemia, hypertension, renal cancer, CKD. Per report patient was at home when she began to feel short of breath. She called her daughter who lives across the street. She stated that she felt that she had phlegm in her throat could not clear it. When her daughter arrived she was sitting in her chair. Her daughter called EMS and states while she was waiting for the squad to arrive she passed out in a chair. Upon squad arriving they did initiate CPR. Patient was transferred to the emergency department aware ACLS was performed. Estimated code time was 48 minutes. Of note there was food found in her trachea upon intubation. Patient was given no sedation and did not wake up post ROSC. Pupils were fixed and dilated. EEG was completed which did show severe cerebral dysfunction. CT head showed cerebral edema and hypoxic ischemia. Patient was transferred to the ICU for further care. Past Medical History: per HPI  Family History: Mother: cancer Father: heart disease   Social History: Lifetime non-smoker, denies alcohol use, denies drug use    ROS   Mental status precludes ROS      Scheduled Meds:   piperacillin-tazobactam  3,375 mg IntraVENous Once    sodium chloride flush  10 mL IntraVENous 2 times per day    heparin (porcine)  5,000 Units SubCUTAneous 3 times per day    piperacillin-tazobactam  3,375 mg IntraVENous Q8H    loteprednol  1 drop Right Eye Daily    timolol  1 drop Both Eyes BID    chlorhexidine  15 mL Mouth/Throat BID    [START ON 12/15/2021] famotidine  20 mg Oral Every Other Day     Continuous Infusions:   norepinephrine 25 mcg/min (12/14/21 2022)    sodium chloride      phenylephrine (SINAN-SYNEPHRINE) 50mg/250mL infusion 175 mcg/min (12/14/21 2030)    insulin 6.4 Units/hr (12/14/21 2021)    dextrose      sodium chloride 75 mL/hr at 12/14/21 1707       PHYSICAL EXAMINATION:  T: 99.5.  P: 107. RR: 20. B/P: 155/52. FiO2: 50. O2 Sat: 98.  I/O: 339/0  Body mass index is 29.32 kg/m². PC: 20/10: TV: 561: RRTotal: 16: Ti:1 sec:  General: Acute on chronically ill-appearing white female  HEENT:  normocephalic and atraumatic. No scleral icterus. Fixed and dilated  Neck: supple. No Thyromegaly. Lungs: clear to auscultation. No retractions  Cardiac: RRR. No JVD. Abdomen: soft. Nontender. Rounded  Extremities:  No clubbing, cyanosis, or edema x 4. Vasculature: capillary refill < 3 seconds. Palpable dorsalis pedis pulses. Skin:  warm and dry. Psych: No cough or gag, no response to physical stimulation. Lymph:  No supraclavicular adenopathy. Neurologic:  No focal deficit. No seizures. Data: (All radiographs, tracings, PFTs, and imaging are personally viewed and interpreted unless otherwise noted).  Sodium 146, potassium 4.0, chloride 109, CO2 22, BUN 72, creatinine 4.4, anion gap 15.0, glucose 226.  WBC 12.5, hemoglobin 12.2, hematocrit 38.2, platelet count 000   Telemetry shows Sinus tachycardia    Pneumonia panel 12/14/2021 negative   Respiratory culture 12/14/2021 pending   Blood cultures 12/14/2021 pending   Chest CT 12/14/2021 reports moderate size pleural effusion. Bilateral rib fractures. Alveolar posterior suggestive of pulmonary edema possible pneumonia   Bilateral lower extremity venous Dopplers pending   Echocardiogram pending          Meets Continued ICU Level Care Criteria:    [x] Yes   [] No - Transfer Planned to listed location:  [] HOSPITALIST CONTACTED-      Case and plan discussed with Dr. Ria Snow        Electronically signed by Munir Perry. KEHINDE Vera - CNP  CRITICAL CARE SPECIALIST    Case was discussed with the resident. Was seen independently by myself. Patient made from the emergency department patient was found and at home CPR was initiated. Patient had CPR performed for greater than 48 minutes in the emergency department.   Patient's findings consistent with acute

## 2021-12-14 NOTE — CONSULTS
[address] Surgery Consultation - Felicitas Kennedy MD      Pt Name: Kun Samuels  MRN: [id number]  YOB: 1945  Date of evaluation: 12/14/2021  Primary Care Physician: Miguel Angel Short DO  Patient evaluated at the request of  Jarret Vail, APRN-CNP  Reason for evaluation: Await abnormal abdominal CT possible ischemic bowel  IMPRESSIONS:   1. Cardiopulmonary arrest after complaining of shortness of breath  2. Code lasting 48 minutes  3. Evidence of severe cerebral dysfunction pupils fixed and dilated patient without reflexes  4. CT findings of mild thickening of the bowel secondary to CODE STATUS and low flow state  5.  has a past medical history of Cataracts, bilateral, Diabetes mellitus ([address] Glaucoma, History of blood transfusion, Hyperlipidemia, Hypertension, Nausea & vomiting, and PONV (postoperative nausea and vomiting). RECOMMENDATIONS:   1. Supportive care per primary service  2. Primary service having discussion with family regarding withdrawal of care  3. Surgical intervention not indicated or appropriate in this patient. SUBJECTIVE:   History of Chief Complaint:    Aniya Pack is a 67 y. o.female who presented to the ED with cardiopulmonary arrest with CPR started. She apparently called her daughter stating she was having shortness of breath. History obtained from chart family not present discussed with nurse at bedside as well as ICU staff. She was reported to have been doing well earlier in the day. She called her daughter who lived across the street from her she was having trouble breathing when daughter arrived she was found down on the floor. EMS was called when they arrived she was in full arrest.  CPR was initiated immediately ACLS protocols were run the emergency department in a bradycardic rhythm was brought back she was intubated there was a significant amount of food found in her trachea. She was suctioned placed on a dopamine drip. She has not been on any sedation.   Pupils are fixed and dilated she has no cough no gag she has no response at all to any painful stimuli. EEG was performed and shows severe cerebral dysfunction. CT scan of the head in the emergency department showed bilateral thalamic hypodensities consistent with edema possible infarcts findings of generalized cerebral edema likely on the basis of hypoxic ischemia. Patient was coded for 48 minutes. CT of the chest showed findings of pulmonary edema there were rib fractures secondary to compressions without pneumothorax. CT scan of the abdomen showed mild diffuse small bowel thickening which was nonspecific. Last pH was 7.35. It was on the basis of the CT findings that surgery was consulted. Discussed with nurse at bedside who states ICU staff is having a discussion with the family the patient is nonsurvivable to discuss potential withdrawal of care. On examination patient has no reflexes pupils are fixed and dilated abdomen no guarding. Past Medical History   has a past medical history of Cataracts, bilateral, Diabetes mellitus ([address] Glaucoma, History of blood transfusion, Hyperlipidemia, Hypertension, Nausea & vomiting, and PONV (postoperative nausea and vomiting). Past Surgical History   has a past surgical history that includes Leg Surgery (Right, 1962); Tonsillectomy (as a child); Cholecystectomy (84); hernia repair (89); Hysterectomy (89); Incisional hernia repair (10/28/2014); Corneal transplant; Abdominal exploration surgery (1996); and partial nephrectomy (Left, 10/8/2020). Medications  Prior to Admission medications    Medication Sig Start Date End Date Taking?  Authorizing Provider   carvedilol (COREG) 12.5 MG tablet Take 1 tablet by mouth twice daily 11/9/21   Wilian Disla MD   glipiZIDE (GLUCOTROL) 10 MG tablet TAKE 1 TABLET BY MOUTH TWICE DAILY BEFORE MEAL(S) 10/22/21   Paramjit Boucher DO   vitamin D (ERGOCALCIFEROL) 1.25 MG (76079 UT) CAPS capsule Take 1 capsule by mouth once a week 10/7/21 Allie Walton MD   lisinopril (PRINIVIL;ZESTRIL) 5 MG tablet Take 1 tablet by mouth daily 9/13/21   Allie Walton MD   loteprednol (LOTEMAX) 0.5 % ophthalmic suspension INSTILL 1 DROP INTO RIGHT EYE ONLY ONCE DAILY 7/28/21   Historical Provider, MD   bumetanide (BUMEX) 2 MG tablet Take 1 tablet by mouth daily 8/9/21   Allie Walton MD   blood glucose test strips (GLUCOSE METER TEST) strip 1 each by In Vitro route daily One Touch Ultra Blue Test Strips [employer], Dx E11.9 4/6/21   Rui Cook DO   pravastatin (PRAVACHOL) 40 MG tablet Take 1 tablet by mouth nightly 1/29/21   Rui Cook DO   SITagliptin (JANUVIA) 100 MG tablet Take 1 tablet by mouth daily 1/8/21   Rui Cook DO   aspirin 325 MG tablet Take 325 mg by mouth daily    Historical Provider, MD   vitamin C (ASCORBIC ACID) 500 MG tablet Take 500 mg by mouth daily    Historical Provider, MD   lodoxamide (ALOMIDE) 0.1 % ophthalmic solution 1 drop as needed    Historical Provider, MD   timolol (TIMOPTIC) 0.5 % ophthalmic solution 1 drop 2 times daily    Historical Provider, MD    Scheduled Meds:   piperacillin-tazobactam  3,375 mg IntraVENous Once    sodium chloride flush  10 mL IntraVENous 2 times per day    heparin (porcine)  5,000 Units SubCUTAneous 3 times per day    piperacillin-tazobactam  3,375 mg IntraVENous Q8H    loteprednol  1 drop Right Eye Daily    timolol  1 drop Both Eyes BID    chlorhexidine  15 mL Mouth/Throat BID    [START ON 12/15/2021] famotidine  20 mg Oral Every Other Day     Continuous Infusions:   norepinephrine 20 mcg/min (12/14/21 1627)    sodium chloride      phenylephrine (SINAN-SYNEPHRINE) 50mg/250mL infusion 200 mcg/min (12/14/21 1629)    insulin 1.8 Units/hr (12/14/21 1659)    dextrose      sodium chloride 75 mL/hr at 12/14/21 1707     PRN Meds:.sodium chloride flush, sodium chloride, ondansetron **OR** ondansetron, polyethylene glycol, acetaminophen **OR** acetaminophen, insulin regular, dextrose, glucose, dextrose, glucagon (rDNA), dextrose, lodoxamide  Allergies  is allergic to relafen [nabumetone] and sulfa antibiotics. Family History  family history includes Cancer in her brother, brother, and mother; Diabetes in her brother and sister; Heart Disease in her brother and father. Social History   reports that she has never smoked. She has never used smokeless tobacco. She reports that she does not drink alcohol and does not use drugs. Review of Systems  Not obtainable GCS 3. Pt intubted  SUBJECTIVE:   CURRENT VITALS:  height is 5' (1.524 m) and weight is 150 lb 2.1 oz (68.1 kg). Her bladder temperature is 100.9 °F (38.3 °C). Her blood pressure is 86/75 and her pulse is 94. Her respiration is 17 and oxygen saturation is 94%. Body mass index is 29.32 kg/m². Temperature Range (24h):Temp: 100.9 °F (38.3 °C) Temp  Av.3 °F (35.7 °C)  Min: 91.9 °F (33.3 °C)  Max: 100.9 °F (38.3 °C)  BP Range (45N): Systolic (01TZB), RAY:660 , Min:61 , ANK:183     Diastolic (21FUW), JCY:00, Min:21, Max:209    Pulse Range (24h): Pulse  Av  Min: 34  Max: 122  Respiration Range (24h): Resp  Av.4  Min: 15  Max: 25  Current Pulse Ox (24h):  SpO2: 94 %  Pulse Ox Range (24h):  SpO2  Av.7 %  Min: 93 %  Max: 100 %  Oxygen Amount and Delivery:    CONSTITUTIONAL: intubated  SKIN: warm pale  CHEST/LUNGS: clear  CARDIOVASCULAR:normal rate  ABDOMEN: soft   NEUROLOGIC: Pupils 8 to 9 mm fixed dilated. No response to deep pain.   No cough no gag reflex  EXTREMITIES: No edema  LABS:     Recent Labs     21  0235 21  0800 21  1221 21  1555   WBC 3.7* 12.5*  --   --    HGB 10.3* 12.2  --   --    HCT 36.5* 38.2  --   --     242  --   --     142  --  146*   K 4.4 5.9*  --  4.0    105  --  109   CO2 14* 23  --  22*   BUN 56* 57*  --  72*   CREATININE 3.8* 3.8*  --  4.4*   CALCIUM 11.5* 9.3  --  8.8   AST  --  258*  --   --    ALT  --  114*  --   --    BILITOT  --  0.2*  --   --    BILIDIR  --  <0.2 --   --    LIPASE  --  75.8*  --   --    LACTA  --  3.5* 2.7*  --      RADIOLOGY:     CT ABDOMEN PELVIS WO CONTRAST Additional Contrast? None   Final Result   1. Patchy consolidative opacities are seen in the visualized lower lungs relating to infectious etiology. 2. Moderate bilateral pleural effusions with compressive atelectasis. Mild circumferential pericardial effusions. 3. Mild diffuse small bowel wall thickening that is nonspecific. Differential consideration would include enteritis versus inflammatory condition versus ischemic change. Lactate levels can be obtained to rule out ischemic changes. 4. Left nephrolithiasis. 5. Other incidental findings as described above. .            **This report has been created using voice recognition software. It may contain minor errors which are inherent in voice recognition technology. **      Final report electronically signed by Dr Jacquie Philip on 12/14/2021 1:32 PM      CT HEAD WO CONTRAST   Final Result   Bilateral thalamic hypodensity consistent with edema, possibly infarcts. Subtle bilateral low-attenuation of parieto-occipital matter and partial    effacement of sulci suspected, raising the possibility of generalized    cerebral edema. No hemorrhage identified. These findings are likely    hypoxic ischemic in etiology. The differential diagnosis is relatively    broad otherwise, including infectious and noninfectious encephalopathy,    including toxic/metabolic etiologies. This document has been electronically signed by: Tequila Orellana MD    on 12/14/2021 06:21 AM      All CTs at this facility use dose modulation techniques and iterative    reconstructions, and/or weight-based dosing   when appropriate to reduce radiation to a low as reasonably achievable. CT CHEST WO CONTRAST   Final Result   1. Interstitial and alveolar opacities bilaterally are suggestive of    pulmonary edema and possibly coexisting pneumonia/pneumonitis.    2. Moderate-sized pleural effusions. 3. Evidence of prior granulomatous disease. 4. Bilateral rib fractures without pneumothorax. 5. Recommend withdrawing the endotracheal tube approximately 2 cm. This document has been electronically signed by: Whitney Medrano MD    on 12/14/2021 06:27 AM      All CTs at this facility use dose modulation techniques and iterative    reconstructions, and/or weight-based dosing   when appropriate to reduce radiation to a low as reasonably achievable. XR ABDOMEN (KUB) (SINGLE AP VIEW)   Final Result   Impression:   1. Distal tip of the nasogastric tube is in the gastric body. This document has been electronically signed by: Emilie Zavala DO on    12/14/2021 04:29 AM         XR CHEST PORTABLE   Final Result   Impression:   1. Limited visualization of the endotracheal tube suspected suboptimal in    positioning. Repositioning is pending. 2. Bilateral pulmonary opacities suggestive of edema versus hemorrhage    versus infection. This document has been electronically signed by: Emilie Zavala DO on    12/14/2021 03:40 AM      XR CHEST PORTABLE   Final Result   Impression:   1. Satisfactory positioning of the endotracheal tube. This document has been electronically signed by: Emilie Zavala DO on    12/14/2021 03:40 AM          Surgery will sign off. Please call for any further questions or requests for reevaluation.     Electronically signed by Iraj Merida MD on 12/14/2021 at 5:38 PM

## 2021-12-14 NOTE — CARE COORDINATION
12/14/21, 8:47 AM EST  DISCHARGE PLANNING EVALUATION:    Natalia Andrews       Admitted: 12/14/2021/ 0141   Hospital day: 0   Location: 4D-06/006-A Reason for admit: Cardiac arrest ([facility] [address] [I46.9]  Elevated troponin [R77.8]  Aspiration pneumonia of both lungs, unspecified aspiration pneumonia type, unspecified part of lung ([facility] [address] [J69.0]  Cardiopulmonary arrest with successful resuscitation ([facility] [address] [I46.9]   PMH:  has a past medical history of Cataracts, bilateral, Diabetes mellitus ([facility] [address] Glaucoma, History of blood transfusion, Hyperlipidemia, Hypertension, Nausea & vomiting, and PONV (postoperative nausea and vomiting). Procedure:   12/14 Pre-hospital Code Blue  12/14 Intubated in ED  12/14 Zoll CVC left femoral  12/14 CT Head:   Bilateral thalamic hypodensity consistent with edema, possibly infarcts. Subtle bilateral low-attenuation of parieto-occipital matter and partial    effacement of sulci suspected, raising the possibility of generalized    cerebral edema. No hemorrhage identified. These findings are likely    hypoxic ischemic in etiology. The differential diagnosis is relatively    broad otherwise, including infectious and noninfectious encephalopathy,    including toxic/metabolic etiologies. 12/14 CT Chest:   1. Interstitial and alveolar opacities bilaterally are suggestive of    pulmonary edema and possibly coexisting pneumonia/pneumonitis. 2. Moderate-sized pleural effusions. 3. Evidence of prior granulomatous disease. 4. Bilateral rib fractures without pneumothorax. 5. Recommend withdrawing the endotracheal tube approximately 2 cm.     12/14 CXR:   Bilateral pulmonary opacities suggestive of edema versus hemorrhage    versus infection     Barriers to Discharge: Presented to ED after patient called her daughter and said she was having trouble breathing. Daughter, who lives across the street, went over and found patient down on the floor.  EMS was called, she was not breathing when they arrived, she was in full arrest and CPR started immediately. Was in asystole when brought in. ROSC achieved, was very bradycardic. Intubated. Small amount of food found in her trachea that was suctioned and cleared. Initial pH was 6.5. Zoll catheter placed and started on dopamine. No TTM at this time. Patient was already cool. Echo done - not read yet. EEG ordered. Remains on vent w/ETT on AC/PC, peep 10, FIO2 50%, sats 96%. 's. No sedation. Pupils fixed and dilated. No cough, no gag. No movement to painful stim x4. Telemetry, CVC, OG to LIWS, flexiseal, dickson. Dopamine drip stopped this morning. Insulin drip to start. Levo @ 10 mcg/min, rosette @ 150 mcg/min, sq heparin, IV zosyn. Received 2L in fld bolus, 1g Ca+ chloride, several amps bicarb. K+ 4.4 -now 5.9, CO2 14 -now 23, BUN 56 - now 57, Creat 3.8, AG 22 - now 14, LA 4.5, pro-bnp 9869, trop 0.074, alb 3.3, alk phos 179, alt 114, ast 258, lipase 75.8, wbc 3.7 - nwo 12.5, hgb 10.3 -now 12.1. COVID 19 was negative. Respiratory and blood cultures sent. PCP: Sandra Griffin DO  Readmission Risk Score: 16.1 ( )%    Patient Goals/Plan/Treatment Preferences: From home. Meet & greet not appropriate at this time. Plan pending clinical course. Transportation/Food Security/Housekeeping Addressed:  No issues identified.

## 2021-12-14 NOTE — ED NOTES
Pt to ER via EMS in cardiac arrest. EMS reports upon their arrival pt was down for approximately 5 mins. EMS started compressions upon arrival and administered one round of epi in route. Asystole noted on their monitor. Upon arrival to the ER, no pulse was present and pt remained asystole on the monitor. CPR resumed. Pts family at bedside states that pt called and said she was having trouble breathing around 0105 the daughter went to pts house within 5  mins and she called 911. Pt continued to say she was short of breath. Daughter turned around to open the door for the squad and when she turned back around, pt was down in a chair. EMS then arrived and took over care.       [address], RN  12/14/21 [id number]

## 2021-12-14 NOTE — ED NOTES
[address] [place] [address] at bedside to insert central line catheter at this time.       [address], RN  12/14/21 6533

## 2021-12-14 NOTE — FLOWSHEET NOTE
0800-Assessment complete. Pupils remain fixed and dilated. No movement to painful stimuli, no cough/gag response. Family aware of prognosis. 1130-Assessment complete. Hypotensive after turning. Significant diarrhea. Complete linen change. 1230-Diarrhea flooding bed. Complete linen change done. 1257-Pt to CT scan. 1300-Family in waiting room, updated. 1320-Pt returned from CT scan    1355-[facility] notified of GCS of 3. Ref # [id number]    1400-Family at bedside. Updated     1630-Assessment complete. Dr Arthur Cates and Megha Man at bedside with family. Discussed CT/EEG and prognosis. 1700-Family mtg with Megha Man CNP to discuss code status. Code status changed to [facility]    2015-Assessment complete. Family remains at bedside. No reflexes noted with assessment.

## 2021-12-14 NOTE — PROGRESS NOTES
Patient arrived to unit from ER via ER RN, ICU RN, RT. Patient transferred to ICU bed and placed on continuous ICU bedside monitor. Patient admitted for Cardiac arrest ([facility] [address] [I46.9]  Elevated troponin [R77.8]  Aspiration pneumonia of both lungs, unspecified aspiration pneumonia type, unspecified part of lung ([facility] [address] [J69.0]  Cardiopulmonary arrest with successful resuscitation ([facility] [address] [I46.9]. Vitals obtained. See flowsheets. Patient's IV access includes 18 G R AC, 18G L AC, IO R shoulder, CVC left fem. Current infusions and rates of infusion include dopamine 10, levo 20. Assessment completed by Leopoldo Rhody. Two nurse skin assessment completed by Leopoldo Rhody and Eloisa Warner RN. See flowsheets for assessment details. Policies and procedures of ICU unable to be explained to patient at this time. Family member(s)/representative(s) present at time of admission include daughter. Patient rights explained to family member(s)/representatives and patient, as able. Patient/patient's family member(s)/representative(s) Requested to have physician notified of their admission. All questions posed by patient's family member(s)/representative(s) and patient answered at this time.

## 2021-12-14 NOTE — FLOWSHEET NOTE
Pt is a 67y.o. female in ED1.  was called to room due to a Code situation; pt Carrie Miranda regained pulse just prior to arrival at the room. Several family members are present in the family waiting area, with another family member who is a known EMT, assisting with communications.  provided presence, active listening, prayer and nurtured hope. Family expressed quiet gratitude for assistance provided. 12/14/21 020   Encounter Summary   Services provided to: Family; Patient not available   Referral/Consult From: [id number] [address] Family members; Children   Place of [address]   Contact [facility] Completed   Complexity of Encounter Moderate   Length of Encounter 45 minutes   Crisis   Type Code   Assessment Anxious; Coping; Shock   Intervention Prayer; Nurtured hope; Active listening; Sustaining presence/ Ministry of presence   Outcome Receptive; Engaged in conversation;  Tearful; Expressed gratitude

## 2021-12-14 NOTE — FLOWSHEET NOTE
12/14/21 1017   Encounter Summary   Services provided to: Patient   Referral/Consult From: Rounding   Continue Visiting Yes  (12/14 NR)   Complexity of Encounter Low   Length of Encounter 15 minutes   Routine   Type Initial   Assessment Unable to respond   Intervention Prayer   In my encounter with the 67 yr old patient, I attempted to see the patient, but they were unresponsive at this time. No family was present in the room. I offered a prayer at the pts side. A  will attempt to see the patient at a later time as a follow up.  The pt was admitted due to cardiac arrest.

## 2021-12-14 NOTE — PROCEDURES
[address] [place] [address]                          ELECTROENCEPHALOGRAM REPORT    PATIENT NAME: Temo Muniz                     :        1945  MED REC NO:   [id number]                           ROOM:       0006  ACCOUNT NO:   [de-identified]                           ADMIT DATE: 2021  PROVIDER:     Vernell Kang. Gadiel Mercado MD    DATE OF EE2021    REFERRING PROVIDER:  Rea Joel CNP    CLINICAL HISTORY:  A 50-year-old female presenting with cardiopulmonary  arrest, CPR started. The patient was placed in asystole upon arrival.   ROCS achieved. The patient was bradycardic and was intubated. Small  amount of food in the trachea was cleared. The patient is not on any  sedation, is unresponsive. CT head performed 2021, shows  bilateral thalamic hypodensities consistent with edema, possibly  infarct, subtle bilateral low-attenuation in the parietal occipital and  parietal effacement of the sulci suspected. No evidence of hemorrhage. Medications listed are piperacillin/tazobactam, heparin, norepinephrine,  Raymon-Synephrine, dextrose. CLINICAL INTERPRETATION:  This is a 17-channel EEG performed without  sleep deprivation. Hyperventilation and photic stimulation were not  performed. The patient is described as unresponsive. The study was  performed with video EEG recording. The background is noted to be of low voltage throughout the study. Hyperventilation was not performed. Photic stimulation was not  performed. There was no definitive evidence of epileptiform activity  appreciated. No clinical seizures were observed. IMPRESSION:  This is an abnormal EEG due to the low voltage, suppressed  background seen throughout the study. This is suggestive of severe  cortical dysfunction. There was no evidence of epileptiform activity  appreciated. No clinical seizures were observed.           NEISHA AGUILAR Tess Hobbs MD    D: 12/14/2021 15:27:14       T: 12/14/2021 15:29:26     [email or web address]  Job#: [id number]     Doc#: [id number]    CC:

## 2021-12-14 NOTE — ED PROVIDER NOTES
[facility]  eMERGENCY dEPARTMENT eNCOUnter          CHIEF COMPLAINT       Chief Complaint   Patient presents with    Code       Nurses Notes reviewed and I agree except as noted in the HPI. HISTORY OF PRESENT ILLNESS    Kayele Mcgovern is a 67 y.o. female who presents in full arrest. According to family who were at bedside. The patient was doing well today when out to eat with them. She was at home she called the daughter who lives across the street said she was having trouble breathing daughter went over there found her down on the floor. Called EMS brought her and she was in full arrest. Patient was not breathing when EMS arrived according to EMS. They started CPR immediately. They initiated ACLS. Brought the patient in. Patient was brought in in asystole. ACLS was run and we had a rhythm back. She was very bradycardic. Patient was intubated significant amount of food was found in her trachea. It was suctioned and cleaned and cleared. Patient was placed on dopamine cooling catheter was placed. REVIEW OF SYSTEMS     Review of Systems   Unable to perform ROS: Acuity of condition         PAST MEDICAL HISTORY    has a past medical history of Cataracts, bilateral, Diabetes mellitus ([facility] [address] Glaucoma, History of blood transfusion, Hyperlipidemia, Hypertension, Nausea & vomiting, and PONV (postoperative nausea and vomiting). SURGICAL HISTORY      has a past surgical history that includes Leg Surgery (Right, 1962); Tonsillectomy (as a child); Cholecystectomy (84); hernia repair (89); Hysterectomy (89); Incisional hernia repair (10/28/2014); Corneal transplant; Abdominal exploration surgery (1996); and partial nephrectomy (Left, 10/8/2020).     CURRENT MEDICATIONS       Current Discharge Medication List      CONTINUE these medications which have NOT CHANGED    Details   carvedilol (COREG) 12.5 MG tablet Take 1 tablet by mouth twice daily  Qty: 180 tablet, Refills: 3      glipiZIDE (GLUCOTROL) 10 MG tablet TAKE 1 TABLET BY MOUTH TWICE DAILY BEFORE MEAL(S)  Qty: 180 tablet, Refills: 3      vitamin D (ERGOCALCIFEROL) 1.25 MG (86078 UT) CAPS capsule Take 1 capsule by mouth once a week  Qty: 4 capsule, Refills: 1      lisinopril (PRINIVIL;ZESTRIL) 5 MG tablet Take 1 tablet by mouth daily  Qty: 90 tablet, Refills: 1      loteprednol (LOTEMAX) 0.5 % ophthalmic suspension INSTILL 1 DROP INTO RIGHT EYE ONLY ONCE DAILY      bumetanide (BUMEX) 2 MG tablet Take 1 tablet by mouth daily  Qty: 90 tablet, Refills: 3      blood glucose test strips (GLUCOSE METER TEST) strip 1 each by In Vitro route daily One Touch Ultra Blue Test Strips [place] [employer], Dx E11.9  Qty: 100 strip, Refills: 5    Associated Diagnoses: Type 2 diabetes mellitus with other diabetic kidney complication, without long-term current use of insulin (HCC)      pravastatin (PRAVACHOL) 40 MG tablet Take 1 tablet by mouth nightly  Qty: 90 tablet, Refills: 3      SITagliptin (JANUVIA) 100 MG tablet Take 1 tablet by mouth daily  Qty: 90 tablet, Refills: 3      aspirin 325 MG tablet Take 325 mg by mouth daily      vitamin C (ASCORBIC ACID) 500 MG tablet Take 500 mg by mouth daily      lodoxamide (ALOMIDE) 0.1 % ophthalmic solution 1 drop as needed      timolol (TIMOPTIC) 0.5 % ophthalmic solution 1 drop 2 times daily             ALLERGIES     is allergic to relafen [nabumetone] and sulfa antibiotics. FAMILY HISTORY     She indicated that her mother is . She indicated that her father is . She indicated that her sister is alive. She indicated that both of her brothers are . She indicated that both of her daughters are alive. She indicated that her son is alive. family history includes Cancer in her brother, brother, and mother; Diabetes in her brother and sister; Heart Disease in her brother and father. SOCIAL HISTORY      reports that she has never smoked.  She has never used smokeless tobacco. She reports that she does not drink alcohol and does not use drugs. PHYSICAL EXAM     INITIAL VITALS:  height is 5' (1.524 m) and weight is 150 lb 2.1 oz (68.1 kg). Her rectal temperature is 91.9 °F (33.3 °C) (abnormal). Her blood pressure is 98/61 and her pulse is 109. Her respiration is 21 and oxygen saturation is 100%. Physical Exam  Constitutional:       General: She is in acute distress. Comments: CPR in progress   HENT:      Head: Normocephalic and atraumatic. Comments: No obvious signs of trauma     Right Ear: Tympanic membrane and ear canal normal.      Left Ear: Tympanic membrane and ear canal normal.   Eyes:      Extraocular Movements: Extraocular movements intact. Right eye: Normal extraocular motion and no nystagmus. Left eye: Normal extraocular motion and no nystagmus. Comments: Fixed and dilated at 3 mm   Neck:      Vascular: Normal carotid pulses. No carotid bruit, hepatojugular reflux or JVD. Comments: No obvious signs of trauma, no obvious JVD  Cardiovascular:      Comments: No spontaneous cardiac activity  Pulmonary:      Comments: No spontaneous respirations  Chest:      Comments: No signs of trauma  Abdominal:      Comments: Stented abdomen no signs of trauma old surgical wounds well-healed   Neurological:      Comments: No purposeful movements           DIFFERENTIAL DIAGNOSIS:   ACS, respiratory failure with rest, arrhythmia, electrolyte disorder    DIAGNOSTIC RESULTS     EKG: All EKG's are interpreted by the Emergency Department Physician who either signs or Co-signs this chart in the absence of a cardiologist.  Initial EKG showed bigeminy, with atrial fibrillation ventricular rate of 57 DC interval indeterminate QRS duration 148 QT interval 480 QTC of 467. This was at [facility] [address] EKG showed a sinus bradycardia with first-degree AV block, right bundle branch block left anterior fascicular block ventricular rate of 57 DC interval 288 QRS duration 140 QT interval 474 QTC of 461.  This was at 0201    Third EKG showed what appears to be a sinus rhythm, ventricular rate of 77 DE interval appears to be 150 QRS duration 124 QT interval 438 QTC of 495. This was performed at [address] [place] [address]: non-plain film images(s) such as CT, Ultrasound and MRI are read by the radiologist.  CT HEAD WO CONTRAST   Final Result   Bilateral thalamic hypodensity consistent with edema, possibly infarcts. Subtle bilateral low-attenuation of parieto-occipital matter and partial    effacement of sulci suspected, raising the possibility of generalized    cerebral edema. No hemorrhage identified. These findings are likely    hypoxic ischemic in etiology. The differential diagnosis is relatively    broad otherwise, including infectious and noninfectious encephalopathy,    including toxic/metabolic etiologies. This document has been electronically signed by: Alessandra Coburn MD    on 12/14/2021 06:21 AM      All CTs at this facility use dose modulation techniques and iterative    reconstructions, and/or weight-based dosing   when appropriate to reduce radiation to a low as reasonably achievable. CT CHEST WO CONTRAST   Final Result   1. Interstitial and alveolar opacities bilaterally are suggestive of    pulmonary edema and possibly coexisting pneumonia/pneumonitis. 2. Moderate-sized pleural effusions. 3. Evidence of prior granulomatous disease. 4. Bilateral rib fractures without pneumothorax. 5. Recommend withdrawing the endotracheal tube approximately 2 cm. This document has been electronically signed by: Alessandra Coburn MD    on 12/14/2021 06:27 AM      All CTs at this facility use dose modulation techniques and iterative    reconstructions, and/or weight-based dosing   when appropriate to reduce radiation to a low as reasonably achievable. XR ABDOMEN (KUB) (SINGLE AP VIEW)   Final Result   Impression:   1. Distal tip of the nasogastric tube is in the gastric body.       This document has been electronically signed by: Nathan Zavala DO on    12/14/2021 04:29 AM         XR CHEST PORTABLE   Final Result   Impression:   1. Limited visualization of the endotracheal tube suspected suboptimal in    positioning. Repositioning is pending. 2. Bilateral pulmonary opacities suggestive of edema versus hemorrhage    versus infection. This document has been electronically signed by: Nathan Zavala DO on    12/14/2021 03:40 AM      XR CHEST PORTABLE   Final Result   Impression:   1. Satisfactory positioning of the endotracheal tube. This document has been electronically signed by: Nathan Zavala DO on    12/14/2021 03:40 AM            LABS:   Labs Reviewed   CBC WITH AUTO DIFFERENTIAL - Abnormal; Notable for the following components:       Result Value    WBC 3.7 (*)     RBC 3.55 (*)     Hemoglobin 10.3 (*)     Hematocrit 36.5 (*)     .8 (*)     MCHC 28.2 (*)     RDW-SD 48.2 (*)     Segs Absolute 1.5 (*)     Monocytes Absolute 0.1 (*)     Immature Grans (Abs) 0.30 (*)     All other components within normal limits   BASIC METABOLIC PANEL W/ REFLEX TO MG FOR LOW K - Abnormal; Notable for the following components:    CO2 14 (*)     Glucose 446 (*)     BUN 56 (*)     CREATININE 3.8 (*)     Calcium 11.5 (*)     All other components within normal limits   BRAIN NATRIURETIC PEPTIDE - Abnormal; Notable for the following components:    Pro-BNP 9869.0 (*)     All other components within normal limits   TROPONIN - Abnormal; Notable for the following components:    Troponin T 0.074 (*)     All other components within normal limits   BLOOD GAS, ARTERIAL - Abnormal; Notable for the following components:    pH, Blood Gas 6.91 (*)     PCO2 129 (*)     PO2 168 (*)     Base Excess (Calculated) -8.4 (*)     All other components within normal limits   ANION GAP - Abnormal; Notable for the following components:    Anion Gap 22.0 (*)     All other components within normal limits   OSMOLALITY - Abnormal; Notable for the following components:    Osmolality Calc 306.7 (*)     All other components within normal limits   GLOMERULAR FILTRATION RATE, ESTIMATED - Abnormal; Notable for the following components:    Est, Glom Filt Rate 12 (*)     All other components within normal limits   POCT GLUCOSE - Abnormal; Notable for the following components:    POC Glucose 256 (*)     All other components within normal limits   COVID-19, RAPID   CULTURE, BLOOD 1   CULTURE, BLOOD 2   SCAN OF BLOOD SMEAR   HEPATIC FUNCTION PANEL   LIPASE   URINE DRUG SCREEN   URINE RT REFLEX TO CULTURE   BLOOD GAS, ARTERIAL       EMERGENCY DEPARTMENT COURSE:   Vitals:    Vitals:    12/14/21 0548 12/14/21 0600 12/14/21 0615 12/14/21 0630   BP:  94/61 (!) 95/55 98/61   Pulse: 103 104 106 109   Resp: 20 25 19 21   Temp:  (!) 91.9 °F (33.3 °C)     TempSrc:  Rectal     SpO2:  96% 99% 100%   Weight:       Height:         Was assessed at bedside code proceeded as above. We did get the patient back. She did require to be on dopamine. I then added Levophed. Patient's initial pH was 6.5. She was given 3 A of bicarb. Then a fourth, and then placed on a bicarb drip. I placed a cooling catheter see note below. Patient was in guardedly stable. She was still on 2 pressors. I called the intensivist and discussed case with them. They graciously accepted the admission. Patient was admitted in stable condition pending further evaluation.     CRITICAL CARE:   40 minutes not including time for procedures    CONSULTS:  ICU    PROCEDURES:  Intubation    Date/Time: 12/14/2021 2:40 AM  Performed by: Antonietta Hoover DO  Authorized by: Antonietta Hoover DO     Consent:     Consent obtained:  Emergent situation    Consent given by:  Healthcare agent    Risks discussed:  Aspiration, bleeding, brain injury, death, dental trauma, hypoxia, laryngeal injury and pneumothorax    Alternatives discussed:  No treatment, delayed treatment and alternative treatment  Pre-procedure details: Patient status:  Unresponsive    Mallampati score:  II    Pretreatment medications:  None    Paralytics:  None  Procedure details:     Preoxygenation:  Bag valve mask    CPR in progress: yes      Intubation method:  Oral    Oral intubation technique:  Video-assisted    Laryngoscope blade: Mac 4    Tube size (mm):  7.5    Tube type:  Cuffed    Number of attempts:  1    Ventilation between attempts: no      Cricoid pressure: no      Tube visualized through cords: yes    Placement assessment:     ETT to lip:  24    Tube secured with: Adhesive tape and ETT wallace    Breath sounds:  Equal    Placement verification: chest rise, CXR verification, equal breath sounds and ETCO2 detector      CXR findings:  ETT in proper place  Post-procedure details:     Patient tolerance of procedure: Tolerated well, no immediate complications  Central Line    Date/Time: 12/14/2021 3:17 AM  Performed by: Sukh Montgomery DO  Authorized by: Sukh Montgomery DO     Consent:     Consent obtained:  Emergent situation    Consent given by:  Healthcare agent    Risks discussed:  Arterial puncture, bleeding, incorrect placement, infection, nerve damage and pneumothorax    Alternatives discussed:  No treatment, delayed treatment and alternative treatment  Pre-procedure details:     Hand hygiene: Hand hygiene performed prior to insertion      Sterile barrier technique: All elements of maximal sterile technique followed      Skin preparation:  2% chlorhexidine and Betadine    Skin preparation agent: Skin preparation agent completely dried prior to procedure    Sedation:     Sedation type: None. Anesthesia (see MAR for exact dosages):      Anesthesia method:  None  Procedure details:     Location:  L femoral    Site selection rationale:  Cooling Catheter    Patient position:  Flat    Procedural supplies:  Triple lumen    Catheter size:  14 Fr    Landmarks identified: yes      Ultrasound guidance: yes      Sterile ultrasound techniques: Sterile gel and sterile probe covers were used      Number of attempts:  1    Successful placement: yes    Post-procedure details:     Post-procedure:  Dressing applied and line sutured    Assessment:  Blood return through all ports, free fluid flow and placement verified by x-ray    Patient tolerance of procedure: Tolerated well, no immediate complications          FINAL IMPRESSION      1. Cardiopulmonary arrest with successful resuscitation ([facility] [address]    2. Elevated troponin    3. Aspiration pneumonia of both lungs, unspecified aspiration pneumonia type, unspecified part of lung ([facility] [address]          DISPOSITION/PLAN   Admission    PATIENT REFERRED TO:  No follow-up provider specified.     DISCHARGE MEDICATIONS:  Current Discharge Medication List          (Please note that portions of this note were completed with a voice recognition program.  Efforts were made to edit the dictations but occasionally words are mis-transcribed.)    Bailee Desai, DO Chao Denson DO  12/14/21 5458

## 2021-12-14 NOTE — CONSULTS
[facility]    [address]  12/14/2021 4:51 PM    Pt Name:    Dietrich Gottron  MRN:     [id number]   [id number]  YOB: 1945  Admit Date:    12/14/2021  1:41 AM  Primary Care Physician:  Savanna Rico DO    [facility] Number:   [id number]    Reason for Consult:  Acute kidney injury  Requesting provider:  Ms. Flavia Mejia, APRN-CNP    History:   The patient is a 67 y.o. with a history of hypertension,, diabetes mellitus, nephrotic range proteinuria, left renal mass status post left partial nephrectomy in October 2020 pathology of which was consistent with clear-cell renal cell carcinoma, CKD stage IV who was brought to the emergency room after sustaining a cardiopulmonary arrest at home. Apparently patient was short of breath last night. No alleviating or aggravating factors reported. No other symptoms known. She called her daughter. When daughter arrived patient was found on the floor. EMS was called. Patient was in full arrest. Patient was resuscitated. It is reported that code lasted almost 48 minutes. When patient was intubated ER noticed significant amount of food in her trachea. It was suctioned. Patient was noted to be hypotensive. She was also noted to be acidotic. Patient was given IV bicarbonate drip. She was started on IV pressors. Patient was subsequently admitted to intensive care unit. Patient is currently on two pressors. She is on mechanical ventilator. She is unresponsive. Her pupils are dilated. Nephrology has been consulted for management of acute kidney injury associated with hyperkalemia and anuria. Multiple family members are in the room. Had lengthy discussion with the family members.     Past Medical History:  Past Medical History:   Diagnosis Date    Cataracts, bilateral     Diabetes mellitus ([facility] [address]     Glaucoma     History of blood transfusion 06/1961    age 15 , s/p leg fracture surgery [facility]    Hyperlipidemia     Hypertension     Nausea & vomiting     PONV (postoperative nausea and vomiting)        Past Surgical History:  Past Surgical History:   Procedure Laterality Date    ABDOMINAL EXPLORATION SURGERY  1996    Adhesions     CHOLECYSTECTOMY  80    CORNEAL TRANSPLANT      HERNIA REPAIR  80    HYSTERECTOMY  80    INCISIONAL HERNIA REPAIR  10/28/2014    laparoscopic incisional herina repair w mesh--Dr. Teresa Orellana    LEG SURGERY Right 1962    FX W/PLATES & REMOVED    PARTIAL NEPHRECTOMY Left 10/8/2020    ROBOTIC LEFT PARTIAL NEPHRECTOMY performed by Vikas Lawson MD at [facility]  as a child       Family History:  Family History   Problem Relation Age of Onset    Cancer Mother         lymphoma    Heart Disease Father     Diabetes Sister     Diabetes Brother     Cancer Brother         leukemia    Cancer Brother         prostate cance    Heart Disease Brother        Social History:  Social History     Socioeconomic History    Marital status:      Spouse name: Not on file    Number of children: Not on file    Years of education: Not on file    Highest education level: Not on file   Occupational History     Comment: retired   Tobacco Use    Smoking status: Never Smoker    Smokeless tobacco: Never Used   Vaping Use    Vaping Use: Never used   Substance and Sexual Activity    Alcohol use: No    Drug use: No    Sexual activity: Not on file   Other Topics Concern    Not on file   Social History Narrative    Not on file     Social Determinants of Health     Financial Resource Strain: Low Risk     Difficulty of Paying Living Expenses: Not very hard   Food Insecurity: No Food Insecurity    Worried About Running Out of Food in the Last Year: Never true    Ever of Food in the Last Year: Never true   Transportation Needs:     Lack of Transportation (Medical): Not on file    Lack of Transportation (Non-Medical):  Not on file   Physical Activity:     Days of Exercise per Week: Not on file    Minutes of Exercise per Session: Not on file   Stress:     Feeling of Stress : Not on file   Social Connections:     Frequency of Communication with Friends and Family: Not on file    Frequency of Social Gatherings with Friends and Family: Not on file    Attends Zoroastrian Services: Not on file    Active Member of [address] [place] [address] or Organizations: Not on file    Attends Club or Organization Meetings: Not on file    Marital Status: Not on file   Intimate Partner Violence:     Fear of Current or Ex-Partner: Not on file    Emotionally Abused: Not on file    Physically Abused: Not on file    Sexually Abused: Not on file   Housing Stability:     Unable to Pay for Housing in the Last Year: Not on file    Number of [place] in the Last Year: Not on file    Unstable Housing in the Last Year: Not on file       Home Meds:  Prior to Admission medications    Medication Sig Start Date End Date Taking?  Authorizing Provider   carvedilol (COREG) 12.5 MG tablet Take 1 tablet by mouth twice daily 11/9/21   Wilian Disla MD   glipiZIDE (GLUCOTROL) 10 MG tablet TAKE 1 TABLET BY MOUTH TWICE DAILY BEFORE MEAL(S) 10/22/21   Paramjit Boucher DO   vitamin D (ERGOCALCIFEROL) 1.25 MG (28667 UT) CAPS capsule Take 1 capsule by mouth once a week 10/7/21   Wilian Disla MD   lisinopril (PRINIVIL;ZESTRIL) 5 MG tablet Take 1 tablet by mouth daily 9/13/21   Wilian Disla MD   loteprednol (LOTEMAX) 0.5 % ophthalmic suspension INSTILL 1 DROP INTO RIGHT EYE ONLY ONCE DAILY 7/28/21   Historical Provider, MD   bumetanide (BUMEX) 2 MG tablet Take 1 tablet by mouth daily 8/9/21   Wilian Disla MD   blood glucose test strips (GLUCOSE METER TEST) strip 1 each by In Vitro route daily One Touch Ultra Blue Test Strips [employer], Dx E11.9 4/6/21   Paramjit Boucher DO   pravastatin (PRAVACHOL) 40 MG tablet Take 1 tablet by mouth nightly 1/29/21   Paramjit Boucher DO   SITagliptin (JANUVIA) 100 MG tablet Take 1 tablet by mouth daily 1/8/21   Nikita Montes DO   aspirin 325 MG tablet Take 325 mg by mouth daily    Historical Provider, MD   vitamin C (ASCORBIC ACID) 500 MG tablet Take 500 mg by mouth daily    Historical Provider, MD   lodoxamide (ALOMIDE) 0.1 % ophthalmic solution 1 drop as needed    Historical Provider, MD   timolol (TIMOPTIC) 0.5 % ophthalmic solution 1 drop 2 times daily    Historical Provider, MD       Review of Systems:  Review of system cannot be obtained at this time. Patient is intubated and unresponsive    Current Meds:  Infusion:    norepinephrine 20 mcg/min (12/14/21 1627)    sodium chloride      phenylephrine (SINAN-SYNEPHRINE) 50mg/250mL infusion 200 mcg/min (12/14/21 1629)    insulin 3.2 Units/hr (12/14/21 1556)    dextrose       Meds:    piperacillin-tazobactam  3,375 mg IntraVENous Once    sodium chloride flush  10 mL IntraVENous 2 times per day    heparin (porcine)  5,000 Units SubCUTAneous 3 times per day    piperacillin-tazobactam  3,375 mg IntraVENous Q8H    loteprednol  1 drop Right Eye Daily    timolol  1 drop Both Eyes BID    chlorhexidine  15 mL Mouth/Throat BID    [START ON 12/15/2021] famotidine  20 mg Oral Every Other Day     Meds prn: sodium chloride flush, sodium chloride, ondansetron **OR** ondansetron, polyethylene glycol, acetaminophen **OR** acetaminophen, insulin regular, dextrose, glucose, dextrose, glucagon (rDNA), dextrose, lodoxamide     Allergies/Intolerances: ALLERGIES: Relafen [nabumetone] and Sulfa antibiotics    24HR INTAKE/OUTPUT:      Intake/Output Summary (Last 24 hours) at 12/14/2021 1651  Last data filed at 12/14/2021 1500  Gross per 24 hour   Intake 514.55 ml   Output 750 ml   Net -235.45 ml     I/O last 3 completed shifts: In: 514.6 [I.V.:464.6; IV Piggyback:50]  Out: 750 [Emesis/NG output:750]  No intake/output data recorded.   Admission weight: 150 lb 2.1 oz (68.1 kg)  Wt Readings from Last 3 Encounters:   12/14/21 150 lb 2.1 oz (68.1 kg)   11/04/21 164 lb (74.4 kg)   09/13/21 165 lb (74.8 kg)     Body mass index is 29.32 kg/m². Physical Examination:  VITALS:   Vitals:    12/14/21 1620 12/14/21 1625 12/14/21 1629 12/14/21 1630   BP: (!) 63/21 (!) 61/30 (!) 84/55 86/75   Pulse: 88 92 91 94   Resp: 21 21 21 17   Temp:    100.9 °F (38.3 °C)   TempSrc:    Bladder   SpO2: 93% 93% 93% 94%   Weight:       Height:         Weight:   Wt Readings from Last 3 Encounters:   12/14/21 150 lb 2.1 oz (68.1 kg)   11/04/21 164 lb (74.4 kg)   09/13/21 165 lb (74.8 kg)     Constitutional and General Appearance: Unresponsive, intubated  Eyes: no icteric sclera in left eye or right eye, pallor conjunctiva both eyes noted   Ears and Nose:  Normal external appearance of nose. No active drainage from nose. Oral: ET tube present  Neck: No jugular venous distention noted  Lungs: On mechanical ventilator  Extremities: No significant lower extremity edema noted  GI: Soft, no distention  Skin: no rash seen on exposed extremities  Musculo: No obvious joint deformities noted  Psychiatric: Cannot be assessed at this time    Lab Data  CBC:   Recent Labs     12/14/21  0235 12/14/21  0800   WBC 3.7* 12.5*   HGB 10.3* 12.2   HCT 36.5* 38.2    242     BMP:  Recent Labs     12/14/21  0235 12/14/21  0800 12/14/21  1555    142 146*   K 4.4 5.9* 4.0    105 109   CO2 14* 23 22*   BUN 56* 57* 72*   CREATININE 3.8* 3.8* 4.4*   GLUCOSE 446* 398* 226*   CALCIUM 11.5* 9.3 8.8     Hepatic:   Recent Labs     12/14/21  0800   LABALBU 3.3*   *   *   BILITOT 0.2*   ALKPHOS 179*     Additional Labs: Initial ABG 6.91/129/168  Repeated ABG earlier this morning pH 7.3 5/40/66  Diagnostics: Chest x-ray shows bilateral pulmonary opacities  Lactic acid 3.5, repeated 2.7  Old tests reviewed. Echo: Not available  Labs: Baseline serum creatinine 3.2 October 2021      Impression and Plan:  1. PREETHI on CKD IV. Patient is anuric. PREETHI is secondary to severe ATN in setting of ischemia and hypotension. Patient is hypotensive requiring two pressors. Anticipate usual ATN course. Serum creatinine is rising. Despite worsening renal function no emergent need for renal replacement therapy presently. I repeated her labs and potassium has improved. 2. Hyperkalemia in setting of cardiac arrest, PREETHI and CKD. Potassium was repeated and it is down to 4.4. We will continue with serial BMP monitoring. Repeat BMP in 6 hours. No emergent need for dialysis right now but electrolytes will be followed closely. Discussed with the family member. May still require CVVH depending on next few potassium levels. 3. Renal cell carcinoma status post partial unilateral nephrectomy  4. Shock liver. Trend enzymes  5. Hypotensive shock. Currently on two pressors  6. Status post cardiac arrest with significant downtime. CT head findings concerning for anoxic brain injury  7. Hyperglycemia/history of diabetes mellitus. Currently on IV insulin drip  8. History of nephrotic range proteinuria  9. Diabetic nephropathy  10. Positive GOYO  11. Medications reviewed  Had lengthy discussion with family members at bedside  Discussed with ICU CNP  Discussed with ICU RN  Nephrology will follow  Thank you for the consult. Please feel free to call me if you have any questions.      Janes Fong MD  [facility]

## 2021-12-14 NOTE — LETTER
[facility]  [address] [facility] [address]  Phone: [phone]             December 14, 2021    Patient: Homer Vigil   YOB: 1945   Date of Visit: 12/14/2021       To Whom It May Concern:    Flakita Weir is currently being treated in our facility beginning 12/14/2021. She is in critical condition and family has been called to the bedside due to poor prognosis and unlikely survivability. Please excuse Son-in-law Clemetine Artist for 12/14/2021 and 12/15/2021 as he was a member of the family called to remain at the bedside.         Sincerely,       Jimmy Moncada RN         Signature:__________________________________

## 2021-12-15 NOTE — DISCHARGE SUMMARY
CRITICAL CARE DISCHARGE SUMMARY      Patient:  Jose Gifford    Unit/Bed:4D-06/006-A  YOB: 1945  MRN: [id number]   PCP: Hugo Dominguez DO  Date of Admission: 12/14/2021  Chief Complaint:- Cardiac arrest    Assessment and Plan:      Acute hypoxic respiratory failure:   Cardiac arrest:   Anoxic brain injury:   Septic shock:    Cardiomyopathy:   PREETHI on CKD:  Lactic acidosis:   Elevated procalcitonin:   Diabetes mellitus:   Normocytic anemia:   Non-anion gap metabolic acidosis:  Hypernatremia:    Leukocytosis:         INITIAL H AND P AND ICU COURSE:  Sal Berger is a 67year old white female who presented to [facility] on 12/14/2021 with complaints of cardiac arrest.  She has past medical history of lifetime non-smoker, cataracts, diabetes mellitus, hyperlipidemia, hypertension, renal cancer, CKD. Per report patient was at home when she began to feel short of breath. She called her daughter who lives across the street. She stated that she felt that she had phlegm in her throat could not clear it. When her daughter arrived she was sitting in her chair. Her daughter called EMS and states while she was waiting for the squad to arrive she passed out in a chair. Upon squad arriving they did initiate CPR. Patient was transferred to the emergency department aware ACLS was performed. Estimated code time was 48 minutes. Of note there was food found in her trachea upon intubation. Patient was given no sedation and did not wake up post ROSC. Pupils were fixed and dilated. EEG was completed which did show severe cerebral dysfunction. CT head showed cerebral edema and hypoxic ischemia. Patient was transferred to the ICU for further care. 12/15 brain death testing was performed. See additional note in chart by Dr. Geovanna Lyn. Nuclear med scan was obtained which did show no flow. Family was updated. LOOP was updated.  was updated. Primary nurse was updated.      Past Medical History: per HPI  Family History: Mother: cancer Father: heart disease   Social History: Lifetime non-smoker, denies alcohol use, denies drug use       Meets Continued ICU Level Care Criteria:    [x] Yes   [] No - Transfer Planned to listed location:  [] HOSPITALIST CONTACTED-      Case and plan discussed with Dr. Ivette Ortiz. Discharge time 47 minutes     TOD: 0720  Date of date:  12/15/2021  Cause of death: Cardiac Arrest secondary to anoxic brain injury    Electronically signed by Josey Canela. KEHINDE Winston - CNP  CRITICAL CARE SPECIALIST  Patient seen by me. Case discussed with nurse practitioner. Italicized font represents changes to the note made by me. CC time 35 minutes. Time was discontiguous. Time does not include procedures. Time does include my direct assessment of the patient and coordination of care.   Electronically signed by Sarina Guevara MD on 12/15/2021 at 6:47 PM

## 2021-12-15 NOTE — PROGRESS NOTES
Neurologic Assessment for Jai Humphrey Death:    Verbal Stimulus: Absent  Noxious Stimulus: Absent  Cough: Absent  Gag: Absent  Pupillary Response: Absent  Corneal Reflex: Absent  Oculocephalic Reflex: Absent  Bilateral Cold Caloric Reflex: Absent  Apnea Test: No attempted spontaneous respirations at 4 minutes and 45 seconds. Test was terminated secondary to hypoxemia  Pre-Apnea AB.31/50/84  Post-Apnea AB.146/72.8 /45.6  NM Brain Flow: Pending  Patient clinically pronounced at 7:20 am  Patient seen by me. Case discussed with NP. Italicized font represents changes to the note made by me. CC time 35 minutes. Time was discontiguous. Time does not include procedures. Time does include my direct assessment of the patient and coordination of care. Electronically signed by Faustino Fothergill, MD on 12/15/2021 at 7:49 AM      Electronically signed by Tianna Travis.  Devi Lim MD.

## 2021-12-15 NOTE — FLOWSHEET NOTE
[place] [address] PROGRESS NOTE      Patient: Edmund Bojorquez  Room #: 4D-06/006-A            YOB: 1945  Age: 67 y.o. Gender: female            Admit Date & Time: 12/14/2021  1:41 AM    Assessment:   encountered pt's family in the [place]ine,that were familiar to me. They informed me that the pt is actively dying.  met with them and other family members in the ICU waiting area. Pt's son informed  that the  from [facility], had come earlier in the day and prayed with them. Interventions:   provided a listening supportive presence and shared scripture with the family. Outcomes:  Family expressed gratitude for the encounter. Plan:  1. Provide spiritual care and support. 2.  Aid family with anticipatory grief as needed. Electronically signed by Leonie Conley on 12/14/2021 at 7:29 PM.  [facility]  [phone]       12/14/21 1830   Encounter Summary   Services provided to: Family   Referral/Consult From: [address] [place]; Family members; Episcopalian/tito community   Place of [employer]   ([facility] [employer])   Contact Episcopalian Completed   Continue Visiting Yes  (12/14)   Complexity of Encounter Moderate   Length of Encounter 30 minutes   Spiritual Assessment Completed Yes   Spiritual/Voodoo   Type Spiritual support   Assessment Approachable;  Anticipatory grief   Intervention Active listening; Explored feelings, thoughts, concerns; Explored coping resources; Sustaining presence/ Ministry of presence; Scripture   Outcome Connection/belonging; Comfort; Engaged in conversation; Expressed feelings/needs/concerns

## 2021-12-15 NOTE — FLOWSHEET NOTE
0800  Pt nonresponsive to pain or verbal stimuli. Intubated and on vent. Dr Debbi Bishop and Poly Mcguire CNP at bedside. Informed family of physical test done. Informed family sending pt for nuclear brain scan for flow. Pt voiced understanding. Emotional support given. 0830  Transported per bed to [facility] with resp therapist partner and monitored. 3025  Returned to room per bed. 1000  Family in to bedside. Informed they are welcome to allow other family members in at this time. Spiritual care in at bedside. [address] CNP in at bedside and informed family of brain scan. Informed family to take time and let us know when ready for withdrawal of ETT. Emotional support given. 200  Family ready to proceed with withdrawal of treatment. Resp therapy notified of removeing ETT. Family at bedside with their spiritual care. 1210  Extubated and OG tube removed. Pt did not take a breath. IVs stopped. Family at bedside. Monitor shows a SR of 76.    1222  Asystole. Informed family that her heart activity has stopped. Auscultated for 1 min for heart tones and no heart tones. Support to family. House supervisor made aware. [id number] be2 notified of cardiac death. States okay to release body as not a candidate. 1330  Family is gone. Post morteum care and lines removed. [address] here and picked body up.

## 2021-12-15 NOTE — PROGRESS NOTES
Patient  on 12/15/21; confirmed death at [phone]. Absence of vital signs, absence of neurological response, physician notified and orders obtained to release the body. Post-Mortem documentation completed; form printed, signed, and given to admitting.     Bob Verdugo, RN    RN Nursing Supervisor  12/15/21   12:29 PM

## 2021-12-16 LAB
CALCIUM IONIZED SERUM: 1.78 MMOL/L (ref 1.12–1.32)
CHLORIDE, WHOLE BLOOD: 107 MEQ/L (ref 98–109)
GLUCOSE, WHOLE BLOOD: 486 MG/DL (ref 70–108)
GRAM STAIN RESULT: NORMAL
GRAM STAIN RESULT: NORMAL
MRSA SCREEN: NORMAL
POC LACTIC ACID: 15.5 MMOL/L (ref 0.5–1.9)
POTASSIUM, WHOLE BLOOD: 3.8 MEQ/L (ref 3.5–4.9)
RESPIRATORY CULTURE: NORMAL
RESPIRATORY CULTURE: NORMAL
SODIUM, WHOLE BLOOD: 139 MEQ/L (ref 138–146)

## 2021-12-19 LAB
BLOOD CULTURE, ROUTINE: NORMAL
BLOOD CULTURE, ROUTINE: NORMAL

## (undated) DEVICE — TIP COVER ACCESSORY

## (undated) DEVICE — TISSUE RETRIEVAL SYSTEM: Brand: INZII RETRIEVAL SYSTEM

## (undated) DEVICE — APPLICATOR LAP 45CM FLX 2 VISTASEAL

## (undated) DEVICE — CANNULA SEAL

## (undated) DEVICE — BASIC SINGLE BASIN BTC-LF: Brand: MEDLINE INDUSTRIES, INC.

## (undated) DEVICE — SEALANT TISS 10 CC FIBRIN VISTASEAL

## (undated) DEVICE — TRI-LUMEN FILTERED TUBE SET WITH ACTIVATED CHARCOAL FILTER: Brand: AIRSEAL

## (undated) DEVICE — COLUMN DRAPE

## (undated) DEVICE — CONTAINER,SPECIMEN,PNEU TUBE,4OZ,OR STRL: Brand: MEDLINE

## (undated) DEVICE — BLADELESS OBTURATOR: Brand: WECK VISTA

## (undated) DEVICE — PACK PROCEDURE SURG ROBOTIC KID SVMMC

## (undated) DEVICE — AGENT HEMSTAT W2XL14IN OXIDIZED REGENERATED CELOS ABSRB FOR

## (undated) DEVICE — TROCAR: Brand: KII FIOS FIRST ENTRY

## (undated) DEVICE — PUMP SUC IRR TBNG L10FT W/ HNDPC ASSEMB STRYKEFLOW 2

## (undated) DEVICE — TOTAL TRAY, 16FR 10ML SIL FOLEY, URN: Brand: MEDLINE

## (undated) DEVICE — 3M™ IOBAN™ 2 ANTIMICROBIAL INCISE DRAPE 6650EZ: Brand: IOBAN™ 2

## (undated) DEVICE — Z DUP USE 2641840 CLIP INT L POLYMER LOK LIG HEM O LOK

## (undated) DEVICE — AIRSEAL 12 MM ACCESS PORT AND PALM GRIP OBTURATOR WITH BLADELESS OPTICAL TIP, 120 MM LENGTH: Brand: AIRSEAL

## (undated) DEVICE — ARM DRAPE

## (undated) DEVICE — SOLUTION ANTIFOG VIS SYS CLEARIFY LAPSCP